# Patient Record
Sex: MALE | Race: WHITE | HISPANIC OR LATINO | Employment: UNEMPLOYED
[De-identification: names, ages, dates, MRNs, and addresses within clinical notes are randomized per-mention and may not be internally consistent; named-entity substitution may affect disease eponyms.]

---

## 2017-07-24 ENCOUNTER — IMAGING SERVICES (OUTPATIENT)
Dept: OTHER | Age: 47
End: 2017-07-24

## 2017-07-24 ENCOUNTER — HOSPITAL (OUTPATIENT)
Dept: OTHER | Age: 47
End: 2017-07-24
Attending: EMERGENCY MEDICINE

## 2020-03-19 ENCOUNTER — APPOINTMENT (OUTPATIENT)
Dept: GENERAL RADIOLOGY | Age: 50
End: 2020-03-19
Attending: EMERGENCY MEDICINE

## 2020-03-19 ENCOUNTER — HOSPITAL ENCOUNTER (EMERGENCY)
Age: 50
Discharge: HOME | End: 2020-03-20
Attending: EMERGENCY MEDICINE

## 2020-03-19 DIAGNOSIS — R07.9 CHEST PAIN, UNSPECIFIED TYPE: Primary | ICD-10-CM

## 2020-03-19 LAB
ANION GAP SERPL CALC-SCNC: 8 MMOL/L (ref 10–20)
BASOPHILS # BLD: 0.1 K/MCL (ref 0–0.3)
BASOPHILS NFR BLD: 1 %
BUN SERPL-MCNC: 18 MG/DL (ref 6–20)
BUN/CREAT SERPL: 26 (ref 7–25)
CALCIUM SERPL-MCNC: 8.6 MG/DL (ref 8.4–10.2)
CHLORIDE SERPL-SCNC: 103 MMOL/L (ref 98–107)
CO2 SERPL-SCNC: 28 MMOL/L (ref 21–32)
CREAT SERPL-MCNC: 0.7 MG/DL (ref 0.67–1.17)
DIFFERENTIAL METHOD BLD: NORMAL
EOSINOPHIL # BLD: 0.1 K/MCL (ref 0.1–0.5)
EOSINOPHIL NFR BLD: 1 %
ERYTHROCYTE [DISTWIDTH] IN BLOOD: 13.5 % (ref 11–15)
GLUCOSE SERPL-MCNC: 251 MG/DL (ref 65–99)
HCT VFR BLD CALC: 43.1 % (ref 39–51)
HGB BLD-MCNC: 14.7 G/DL (ref 13–17)
IMM GRANULOCYTES # BLD AUTO: 0 K/MCL (ref 0–0.2)
IMM GRANULOCYTES NFR BLD: 0 %
LYMPHOCYTES # BLD: 3.1 K/MCL (ref 1–4.8)
LYMPHOCYTES NFR BLD: 32 %
MCH RBC QN AUTO: 30 PG (ref 26–34)
MCHC RBC AUTO-ENTMCNC: 34.1 G/DL (ref 32–36.5)
MCV RBC AUTO: 88 FL (ref 78–100)
MONOCYTES # BLD: 0.8 K/MCL (ref 0.3–0.9)
MONOCYTES NFR BLD: 8 %
NEUTROPHILS # BLD: 5.6 K/MCL (ref 1.8–7.7)
NEUTROPHILS NFR BLD: 58 %
NRBC BLD MANUAL-RTO: 0 /100 WBC
NT-PROBNP SERPL-MCNC: 122 PG/ML
PLATELET # BLD: 151 K/MCL (ref 140–450)
POTASSIUM SERPL-SCNC: 3.8 MMOL/L (ref 3.4–5.1)
RBC # BLD: 4.9 MIL/MCL (ref 4.5–5.9)
SODIUM SERPL-SCNC: 135 MMOL/L (ref 135–145)
TROPONIN I SERPL HS-MCNC: <0.02 NG/ML
WBC # BLD: 9.8 K/MCL (ref 4.2–11)

## 2020-03-19 PROCEDURE — 80048 BASIC METABOLIC PNL TOTAL CA: CPT

## 2020-03-19 PROCEDURE — 99285 EMERGENCY DEPT VISIT HI MDM: CPT

## 2020-03-19 PROCEDURE — 84484 ASSAY OF TROPONIN QUANT: CPT

## 2020-03-19 PROCEDURE — 83880 ASSAY OF NATRIURETIC PEPTIDE: CPT

## 2020-03-19 PROCEDURE — 71045 X-RAY EXAM CHEST 1 VIEW: CPT

## 2020-03-19 PROCEDURE — 96374 THER/PROPH/DIAG INJ IV PUSH: CPT

## 2020-03-19 PROCEDURE — 93005 ELECTROCARDIOGRAM TRACING: CPT | Performed by: EMERGENCY MEDICINE

## 2020-03-19 PROCEDURE — 85025 COMPLETE CBC W/AUTO DIFF WBC: CPT

## 2020-03-19 RX ORDER — HYDROCODONE BITARTRATE AND ACETAMINOPHEN 10; 325 MG/1; MG/1
1 TABLET ORAL ONCE
Status: COMPLETED | OUTPATIENT
Start: 2020-03-19 | End: 2020-03-20

## 2020-03-19 RX ORDER — LIDOCAINE 4 G/G
1 PATCH TOPICAL ONCE
Status: DISCONTINUED | OUTPATIENT
Start: 2020-03-19 | End: 2020-03-20 | Stop reason: HOSPADM

## 2020-03-19 RX ORDER — ASPIRIN 81 MG/1
324 TABLET, CHEWABLE ORAL ONCE
Status: DISCONTINUED | OUTPATIENT
Start: 2020-03-19 | End: 2020-03-20

## 2020-03-19 ASSESSMENT — ENCOUNTER SYMPTOMS
DIZZINESS: 0
SHORTNESS OF BREATH: 0
BACK PAIN: 0
NAUSEA: 0
CHILLS: 0
FEVER: 0
SORE THROAT: 0
HEADACHES: 0
COUGH: 0
COLOR CHANGE: 0
ABDOMINAL PAIN: 0
EYE PAIN: 0

## 2020-03-19 ASSESSMENT — PAIN SCALES - GENERAL: PAINLEVEL_OUTOF10: 8

## 2020-03-19 ASSESSMENT — PAIN DESCRIPTION - PAIN TYPE: TYPE: ACUTE PAIN

## 2020-03-20 VITALS
DIASTOLIC BLOOD PRESSURE: 75 MMHG | SYSTOLIC BLOOD PRESSURE: 116 MMHG | OXYGEN SATURATION: 99 % | HEART RATE: 80 BPM | RESPIRATION RATE: 16 BRPM

## 2020-03-20 LAB
ATRIAL RATE (BPM): 71
ATRIAL RATE (BPM): 84
LDH SERPL L TO P-CCNC: 165 UNITS/L (ref 86–234)
P AXIS (DEGREES): 66
P AXIS (DEGREES): 66
PR-INTERVAL (MSEC): 160
PR-INTERVAL (MSEC): 168
QRS-INTERVAL (MSEC): 106
QRS-INTERVAL (MSEC): 94
QT-INTERVAL (MSEC): 302
QT-INTERVAL (MSEC): 384
QTC: 357
QTC: 418
R AXIS (DEGREES): 80
R AXIS (DEGREES): 86
REPORT TEXT: NORMAL
REPORT TEXT: NORMAL
T AXIS (DEGREES): 87
T AXIS (DEGREES): 91
TROPONIN I SERPL HS-MCNC: <0.02 NG/ML
VENTRICULAR RATE EKG/MIN (BPM): 71
VENTRICULAR RATE EKG/MIN (BPM): 84

## 2020-03-20 PROCEDURE — 10004651 HB RX, NO CHARGE ITEM: Performed by: EMERGENCY MEDICINE

## 2020-03-20 PROCEDURE — 93005 ELECTROCARDIOGRAM TRACING: CPT | Performed by: EMERGENCY MEDICINE

## 2020-03-20 PROCEDURE — 10002800 HB RX 250 W HCPCS: Performed by: EMERGENCY MEDICINE

## 2020-03-20 PROCEDURE — 83615 LACTATE (LD) (LDH) ENZYME: CPT

## 2020-03-20 PROCEDURE — 99285 EMERGENCY DEPT VISIT HI MDM: CPT | Performed by: EMERGENCY MEDICINE

## 2020-03-20 PROCEDURE — 84484 ASSAY OF TROPONIN QUANT: CPT

## 2020-03-20 PROCEDURE — 10002803 HB RX 637: Performed by: EMERGENCY MEDICINE

## 2020-03-20 RX ORDER — ASPIRIN 81 MG/1
162 TABLET, CHEWABLE ORAL ONCE
Status: COMPLETED | OUTPATIENT
Start: 2020-03-20 | End: 2020-03-20

## 2020-03-20 RX ORDER — HYDROCODONE BITARTRATE AND ACETAMINOPHEN 5; 325 MG/1; MG/1
1-2 TABLET ORAL EVERY 4 HOURS PRN
Qty: 12 TABLET | Refills: 0 | Status: SHIPPED | OUTPATIENT
Start: 2020-03-20

## 2020-03-20 RX ORDER — LIDOCAINE 50 MG/G
1 PATCH TOPICAL EVERY 24 HOURS
Qty: 15 PATCH | Refills: 0 | Status: SHIPPED | OUTPATIENT
Start: 2020-03-20

## 2020-03-20 RX ADMIN — HYDROCODONE BITARTRATE AND ACETAMINOPHEN 1 TABLET: 10; 325 TABLET ORAL at 00:16

## 2020-03-20 RX ADMIN — KETOROLAC TROMETHAMINE 15 MG: 15 INJECTION, SOLUTION INTRAMUSCULAR; INTRAVENOUS at 00:24

## 2020-03-20 RX ADMIN — ASPIRIN 81 MG 162 MG: 81 TABLET ORAL at 00:16

## 2020-03-20 RX ADMIN — LIDOCAINE 1 PATCH: 246 PATCH TOPICAL at 00:23

## 2020-03-20 ASSESSMENT — PAIN SCALES - GENERAL: PAINLEVEL_OUTOF10: 2

## 2021-04-23 ENCOUNTER — HOSPITAL ENCOUNTER (INPATIENT)
Facility: HOSPITAL | Age: 51
LOS: 6 days | Discharge: HOME | DRG: 253 | End: 2021-04-30
Attending: INTERNAL MEDICINE | Admitting: HOSPITALIST

## 2021-04-23 ENCOUNTER — APPOINTMENT (OUTPATIENT)
Dept: GENERAL RADIOLOGY | Facility: HOSPITAL | Age: 51
DRG: 253 | End: 2021-04-23

## 2021-04-23 DIAGNOSIS — I73.9 PERIPHERAL VASCULAR DISEASE (HCC): ICD-10-CM

## 2021-04-23 DIAGNOSIS — I96 GANGRENE (HCC): ICD-10-CM

## 2021-04-23 DIAGNOSIS — L03.115 CELLULITIS OF RIGHT LOWER EXTREMITY: ICD-10-CM

## 2021-04-23 DIAGNOSIS — S92.411A CLOSED DISPLACED FRACTURE OF PROXIMAL PHALANX OF RIGHT GREAT TOE, INITIAL ENCOUNTER: Primary | ICD-10-CM

## 2021-04-23 PROCEDURE — 73630 X-RAY EXAM OF FOOT: CPT

## 2021-04-23 RX ORDER — KETOROLAC TROMETHAMINE 30 MG/ML
30 INJECTION, SOLUTION INTRAMUSCULAR; INTRAVENOUS ONCE
Status: COMPLETED | OUTPATIENT
Start: 2021-04-23 | End: 2021-04-23

## 2021-04-23 RX ORDER — HYDROCODONE BITARTRATE AND ACETAMINOPHEN 5; 325 MG/1; MG/1
1 TABLET ORAL ONCE
Status: COMPLETED | OUTPATIENT
Start: 2021-04-23 | End: 2021-04-23

## 2021-04-23 RX ORDER — VANCOMYCIN 2 GRAM/500 ML IN 0.9 % SODIUM CHLORIDE INTRAVENOUS
25 ONCE
Status: COMPLETED | OUTPATIENT
Start: 2021-04-23 | End: 2021-04-24

## 2021-04-24 ENCOUNTER — APPOINTMENT (OUTPATIENT)
Dept: MRI IMAGING | Facility: HOSPITAL | Age: 51
DRG: 253 | End: 2021-04-24
Attending: HOSPITALIST

## 2021-04-24 PROBLEM — L03.115 CELLULITIS OF RIGHT LOWER EXTREMITY: Status: ACTIVE | Noted: 2021-04-24

## 2021-04-24 PROBLEM — S92.411A CLOSED DISPLACED FRACTURE OF PROXIMAL PHALANX OF RIGHT GREAT TOE, INITIAL ENCOUNTER: Status: ACTIVE | Noted: 2021-04-24

## 2021-04-24 PROBLEM — I73.9 PERIPHERAL VASCULAR DISEASE (HCC): Status: ACTIVE | Noted: 2021-04-24

## 2021-04-24 PROCEDURE — 73720 MRI LWR EXTREMITY W/O&W/DYE: CPT | Performed by: HOSPITALIST

## 2021-04-24 PROCEDURE — 99253 IP/OBS CNSLTJ NEW/EST LOW 45: CPT | Performed by: PODIATRIST

## 2021-04-24 PROCEDURE — 0HDRXZZ EXTRACTION OF TOE NAIL, EXTERNAL APPROACH: ICD-10-PCS | Performed by: PODIATRIST

## 2021-04-24 PROCEDURE — 11730 AVULSION NAIL PLATE SIMPLE 1: CPT | Performed by: PODIATRIST

## 2021-04-24 RX ORDER — DOCUSATE SODIUM 100 MG/1
100 CAPSULE, LIQUID FILLED ORAL 2 TIMES DAILY
Status: DISCONTINUED | OUTPATIENT
Start: 2021-04-24 | End: 2021-04-30

## 2021-04-24 RX ORDER — POLYETHYLENE GLYCOL 3350 17 G/17G
17 POWDER, FOR SOLUTION ORAL DAILY PRN
Status: DISCONTINUED | OUTPATIENT
Start: 2021-04-24 | End: 2021-04-30

## 2021-04-24 RX ORDER — DEXTROSE MONOHYDRATE 25 G/50ML
50 INJECTION, SOLUTION INTRAVENOUS
Status: DISCONTINUED | OUTPATIENT
Start: 2021-04-24 | End: 2021-04-24

## 2021-04-24 RX ORDER — DEXTROSE MONOHYDRATE 25 G/50ML
50 INJECTION, SOLUTION INTRAVENOUS
Status: DISCONTINUED | OUTPATIENT
Start: 2021-04-24 | End: 2021-04-30

## 2021-04-24 RX ORDER — HYDROMORPHONE HYDROCHLORIDE 1 MG/ML
0.4 INJECTION, SOLUTION INTRAMUSCULAR; INTRAVENOUS; SUBCUTANEOUS EVERY 2 HOUR PRN
Status: DISCONTINUED | OUTPATIENT
Start: 2021-04-24 | End: 2021-04-30

## 2021-04-24 RX ORDER — ACETAMINOPHEN 325 MG/1
650 TABLET ORAL EVERY 4 HOURS PRN
Status: DISCONTINUED | OUTPATIENT
Start: 2021-04-24 | End: 2021-04-30

## 2021-04-24 RX ORDER — LIDOCAINE HYDROCHLORIDE 10 MG/ML
5 INJECTION, SOLUTION EPIDURAL; INFILTRATION; INTRACAUDAL; PERINEURAL ONCE
Status: COMPLETED | OUTPATIENT
Start: 2021-04-24 | End: 2021-04-24

## 2021-04-24 RX ORDER — HYDROCODONE BITARTRATE AND ACETAMINOPHEN 5; 325 MG/1; MG/1
1 TABLET ORAL EVERY 4 HOURS PRN
Status: DISCONTINUED | OUTPATIENT
Start: 2021-04-24 | End: 2021-04-30

## 2021-04-24 RX ORDER — POTASSIUM CHLORIDE 20 MEQ/1
40 TABLET, EXTENDED RELEASE ORAL EVERY 4 HOURS
Status: COMPLETED | OUTPATIENT
Start: 2021-04-24 | End: 2021-04-24

## 2021-04-24 RX ORDER — SODIUM CHLORIDE 9 MG/ML
INJECTION, SOLUTION INTRAVENOUS CONTINUOUS
Status: DISCONTINUED | OUTPATIENT
Start: 2021-04-24 | End: 2021-04-26

## 2021-04-24 RX ORDER — METRONIDAZOLE 500 MG/100ML
500 INJECTION, SOLUTION INTRAVENOUS EVERY 8 HOURS
Status: DISCONTINUED | OUTPATIENT
Start: 2021-04-24 | End: 2021-04-24

## 2021-04-24 RX ORDER — BISACODYL 10 MG
10 SUPPOSITORY, RECTAL RECTAL
Status: DISCONTINUED | OUTPATIENT
Start: 2021-04-24 | End: 2021-04-30

## 2021-04-24 RX ORDER — TEMAZEPAM 15 MG/1
15 CAPSULE ORAL NIGHTLY PRN
Status: DISCONTINUED | OUTPATIENT
Start: 2021-04-24 | End: 2021-04-30

## 2021-04-24 RX ORDER — ENOXAPARIN SODIUM 100 MG/ML
40 INJECTION SUBCUTANEOUS DAILY
Status: DISCONTINUED | OUTPATIENT
Start: 2021-04-24 | End: 2021-04-27

## 2021-04-24 RX ORDER — ONDANSETRON 2 MG/ML
4 INJECTION INTRAMUSCULAR; INTRAVENOUS EVERY 6 HOURS PRN
Status: DISCONTINUED | OUTPATIENT
Start: 2021-04-24 | End: 2021-04-30

## 2021-04-24 RX ORDER — HYDROMORPHONE HYDROCHLORIDE 1 MG/ML
0.8 INJECTION, SOLUTION INTRAMUSCULAR; INTRAVENOUS; SUBCUTANEOUS EVERY 2 HOUR PRN
Status: DISCONTINUED | OUTPATIENT
Start: 2021-04-24 | End: 2021-04-30

## 2021-04-24 RX ORDER — VANCOMYCIN HYDROCHLORIDE
1500 EVERY 12 HOURS
Status: DISCONTINUED | OUTPATIENT
Start: 2021-04-24 | End: 2021-04-25

## 2021-04-24 RX ORDER — HYDROCODONE BITARTRATE AND ACETAMINOPHEN 5; 325 MG/1; MG/1
2 TABLET ORAL EVERY 4 HOURS PRN
Status: DISCONTINUED | OUTPATIENT
Start: 2021-04-24 | End: 2021-04-30

## 2021-04-24 RX ORDER — SODIUM PHOSPHATE, DIBASIC AND SODIUM PHOSPHATE, MONOBASIC 7; 19 G/133ML; G/133ML
1 ENEMA RECTAL ONCE AS NEEDED
Status: DISCONTINUED | OUTPATIENT
Start: 2021-04-24 | End: 2021-04-30

## 2021-04-24 RX ORDER — HYDROMORPHONE HYDROCHLORIDE 1 MG/ML
0.2 INJECTION, SOLUTION INTRAMUSCULAR; INTRAVENOUS; SUBCUTANEOUS EVERY 2 HOUR PRN
Status: DISCONTINUED | OUTPATIENT
Start: 2021-04-24 | End: 2021-04-30

## 2021-04-24 NOTE — ED QUICK NOTES
Orders for admission, patient is aware of plan and ready to go upstairs. Any questions, please call ED RN Janie  at extension [phone]. .   Type of COVID test sent:Rapid  COVID Suspicion level: Low.  Results negative    Titratable drug(s) infusing:Vancomycin

## 2021-04-24 NOTE — CONSULTS
[facility]    Report of Consultation    Aram Mancilla Patient Status:  Inpatient    1970 MRN [id number]   Location [facility] 5SW/SE Attending Grabiel Gardner MD   Hosp Day # 0 PCP None Pcp     Date of Admission:  2021  D 0.2 mg, Intravenous, Q2H PRN **OR** HYDROmorphone HCl (DILAUDID) 1 MG/ML injection 0.4 mg, 0.4 mg, Intravenous, Q2H PRN **OR** HYDROmorphone HCl (DILAUDID) 1 MG/ML injection 0.8 mg, 0.8 mg, Intravenous, Q2H PRN  •  temazepam (RESTORIL) cap 15 mg, 15 mg, Or includes an Addendum and supersedes previous reports for this exam.    PROCEDURE: XR FOOT, COMPLETE (MIN 3 VIEWS), RIGHT (CPT=73630)  COMPARISON: None. INDICATIONS: Right foot pain and distal swelling post dropping maria l on foot x2 weeks ago.   TECHNIQUE: proximal phalanx.      -Evaluated MRI right foot 4/24/21 revealed no signs of abscess or osteomyelitis    -Due to subungual hematoma and concern for underlying infection and open fracture, recommended Right hallux nail avulsion, pt agrees.     All questions

## 2021-04-24 NOTE — PLAN OF CARE
Problem: Patient Centered Care  Goal: Patient preferences are identified and integrated in the patient's plan of care  Description: Interventions:  - What would you like us to know as we care for you?   - Provide timely, complete, and accurate informatio distraction and/or relaxation techniques  - Monitor for opioid side effects  - Notify MD/LIP if interventions unsuccessful or patient reports new pain  - Anticipate increased pain with activity and pre-medicate as appropriate  Outcome: Progressing     Prob functional status, cognitive ability or social support system  Outcome: Progressing  VSS,had mri today,bedside I&D done today by , dressing dry and intact,wound culture send,pain controlled with dilaudid and norco,unable to do arterial US today

## 2021-04-24 NOTE — PROGRESS NOTES
[facility] Dosing Service    Initial Pharmacokinetic Consult for Vancomycin Dosing     Jim Estrada is a 49year old patient who is being treated for  DM foot lesion with open fx .   Pharmacy has been asked to dose Vancomycin by Dr. Humberto Ball

## 2021-04-24 NOTE — PLAN OF CARE
Problem: Patient Centered Care  Goal: Patient preferences are identified and integrated in the patient's plan of care  Description: Interventions:  - What would you like us to know as we care for you?   From home with my son  - Provide timely, complete, a influences on pain and pain management  - Manage/alleviate anxiety  - Utilize distraction and/or relaxation techniques  - Monitor for opioid side effects  - Notify MD/LIP if interventions unsuccessful or patient reports new pain  - Anticipate increased marysol

## 2021-04-24 NOTE — PROGRESS NOTES
[facility] Pharmacy Note: Antimicrobial Weight Based Dose Adjustment for: cefepime (MAXIPIME)    Jessica Rivera is a 49year old patient who has been prescribed cefepime (MAXIPIME) 2gm every 8 hours.     Estimated Creatinine Clearance: 121.3 mL/min (based on SCr

## 2021-04-24 NOTE — ED PROVIDER NOTES
Patient Seen in: [facility] AND [facility] Emergency Department      History   Patient presents with:  Leg or Foot Injury    Stated Complaint: Foot Injury    HPI/Subjective:   HPI    56-year-old male presents the emergency department for evaluation.   Patient sta atraumatic. Right Ear: External ear normal.      Left Ear: External ear normal.      Nose: Nose normal.      Mouth/Throat:      Mouth: Mucous membranes are moist.      Pharynx: Oropharynx is clear.    Eyes:      Extraocular Movements: Extraocular movem DIFFERENTIAL WITH PLATELET    Narrative: The following orders were created for panel order CBC WITH DIFFERENTIAL WITH PLATELET.   Procedure                               Abnormality         Status                     --------- MD on 4/23/2021 at 10:06 PM     Finalized by (CST): Deacon Garcia MD on 4/23/2021 at 10:07 PM    ADDENDUM:  Corrected report to reflect presence of an acute articular fracture in the right 1st proximal phalanx.   Dictated by (CST): Deacon Garcia MD on 4/23/20

## 2021-04-24 NOTE — CONSULTS
[facility] Infectious Disease Consult    [facility] Patient Status:  Inpatient    1970 MRN [id number]   Location [facility] 5SW/SE Attending Grabiel Gardner MD   Hosp Day # 0 PCP None Pcp     Reason for Consultation:   To help w injection 0.2 mg, 0.2 mg, Intravenous, Q2H PRN **OR** HYDROmorphone HCl (DILAUDID) 1 MG/ML injection 0.4 mg, 0.4 mg, Intravenous, Q2H PRN **OR** HYDROmorphone HCl (DILAUDID) 1 MG/ML injection 0.8 mg, 0.8 mg, Intravenous, Q2H PRN  •  temazepam (RESTORIL) ca skin lesions, and pruritus. Hematologic/lymphatic: Negative for easy bruising, bleeding, and lymphadenopathy. Musculoskeletal: Negative for myalgias, arthralgias, muscle weakness.   Neurological: Negative for headaches, dizziness, seizures, memory problem 10.1 04/24/2021    HGB 13.1 04/24/2021    HCT 38.5 04/24/2021    .0 04/24/2021    CREATSERUM 0.66 04/24/2021    BUN 11 04/24/2021     04/24/2021    K 3.6 04/24/2021     04/24/2021    CO2 26.0 04/24/2021     04/24/2021    CA 7.8 04

## 2021-04-24 NOTE — ED QUICK NOTES
Right foot swollen and redness noted. Right pedal pulse + via doppler. Right great toe necrotic in appearance. Pt states maria l at work fell on right foot 2 weeks ago. Pt with hx of diabetes. Pt non compliant with diabetic medication.  Pt denies feve

## 2021-04-24 NOTE — ED INITIAL ASSESSMENT (HPI)
States he dropped a maria l on his foot two weeks ago at work. [employer]. Bruising and swelling to right foot. Great toe bruising and discolored - deep purple and black with some blood oozing at the corner of his nail. Pulses palpable.

## 2021-04-24 NOTE — ED QUICK NOTES
Pt reports no improvement in pain with Andes administration. Linda HERNANDEZ notified. Orders placed by provider.

## 2021-04-24 NOTE — ED QUICK NOTES
Orders for admission, patient is aware of plan and ready to go upstairs. Any questions, please call ED RN Philippe Roca  at [address]    Type of COVID test sent: rapid(-)  COVID Suspicion level:Low    Titratable drug(s) infusing: none  Rate:na    LOC at [place]

## 2021-04-24 NOTE — H&P
[facility] Hospitalist H&P       CC: Patient presents with:  Leg or Foot Injury       PCP: None Pcp    History of Present Illness: 48 y/o m p/w pain to toe after dropping heavy maria l on his toe few weeks ago, pain and swelling have worsened      PMH  History re 04/23/21 2150 04/24/21  0643   WBC 13.3* 10.1   HGB 15.2 13.1   MCV 86.2 86.7   .0 156.0       Recent Labs   Lab 04/23/21 2150 04/24/21  0643    139   K 3.9 3.6    108   CO2 29.0 26.0   BUN 11 11   CREATSERUM 0.80 0.66*   * 202* deformities.    Dictated by (CST): Jozef Santana MD on 4/23/2021 at 10:06 PM     Finalized by (CST): Jozef Santana MD on 4/23/2021 at 10:07 PM              ASSESSMENT / PLAN:   50 y/o m p/w pain to toe after dropping heavy maria l on his toe few weeks ago, pain

## 2021-04-25 PROCEDURE — 99232 SBSQ HOSP IP/OBS MODERATE 35: CPT | Performed by: PODIATRIST

## 2021-04-25 RX ORDER — VANCOMYCIN/0.9 % SOD CHLORIDE 1.75 G/5
1750 PLASTIC BAG, INJECTION (ML) INTRAVENOUS EVERY 12 HOURS
Status: DISCONTINUED | OUTPATIENT
Start: 2021-04-25 | End: 2021-04-25

## 2021-04-25 NOTE — PROGRESS NOTES
Robert Blank is a 49year old male admitted to [address] [place] [address] on 4/23 post traumatic R foot injury,     HPI:    Patient seen and examined,   Afebrile,   Previous entries noted,   No new complains,   Pain is much better,     Allergies:  No Known Allergies     There Osmolality 293 275 - 295 mOsm/kg    GFR, Non- 113 >=60    GFR, -American 130 >=60   POTASSIUM   Result Value Ref Range    Potassium 4.0 3.5 - 5.1 mmol/L   VANCOMYCIN TROUGH, SERUM   Result Value Ref Range    Vancomycin Trough 6.4 (L) (Group B beta strep)    Aerobic Smear No WBCs seen     Aerobic Smear 2+ Gram positive cocci in pairs     Aerobic Smear 1+ Gram Negative Rods    MRSA SCREEN BY PCR   Result Value Ref Range    MRSA Screen By PCR Negative Negative   CBC W/ DIFFERENTIAL   Resu ROS:   GENERAL HEALTH: No fevers, chills, unexpected weight loss. HEENT:  Denies sore throat, neck pain,neck rigidity, difficulty swallowing, swollen glands. RESPIRATORY:  Denies SOB or cough. CARDIOVASCULAR:  Denies CP or palpitations.   GI:  Lucila Laguerre Patient understood and Agreed.

## 2021-04-25 NOTE — PROGRESS NOTES
[facility] Hospitalist Progress Note     CC: Hospital Follow up    PCP: None Pcp       Assessment/Plan:     Principal Problem:    Closed displaced fracture of proximal phalanx of right great toe, initial encounter  Active Problems:    Closed displaced fracture of Recent Labs   Lab 04/23/21  2150 04/24/21  0643   RBC 5.09 4.44   HGB 15.2 13.1   HCT 43.9 38.5*   MCV 86.2 86.7   MCH 29.9 29.5   MCHC 34.6 34.0   RDW 12.9 13.0   NEPRELIM 9.46* 6.52   WBC 13.3* 10.1   .0 156.0         Recent Labs   Lab 04/23/2 (CST): Khris Diaz MD on 4/23/2021 at 10:09 PM             Result Date: 4/23/2021  CONCLUSION: No acute osseous abnormality. Calcaneal enthesophyte. Hammertoe deformities.    Dictated by (CST): Khris Diaz MD on 4/23/2021 at 10:06 PM     Finalized by (C

## 2021-04-25 NOTE — PROGRESS NOTES
[facility] dosing service    Follow-up Pharmacokinetic Consult for Vancomycin Dosing     Lennon Carrel is a 49year old patient who is being treated for cellulitis. Patient is on day 3 of Vancomycin and is currently receiving 1.5 gm IV Q 12 hours.   G avoid an overnight leve. Goal trough level 10-15 ug/mL. 3.  Pharmacy will need Scr daily while on Vancomycin to assess renal function. 4.  Pharmacy will follow and adjust as necessary.     We appreciate the opportunity to assist in the care of this p

## 2021-04-25 NOTE — PLAN OF CARE
Cont'd IV Vanco and  Cefepime. No changes overnight. Vitals stable.     Problem: Patient Centered Care  Goal: Patient preferences are identified and integrated in the patient's plan of care  Description: Interventions:  - What would you like us to know as w neutropenic period  Description: INTERVENTIONS  - Monitor WBC  - Administer growth factors as ordered  - Implement neutropenic guidelines  Outcome: Progressing     Problem: SAFETY ADULT - FALL  Goal: Free from fall injury  Description: INTERVENTIONS:  - As

## 2021-04-25 NOTE — PLAN OF CARE
Problem: Patient Centered Care  Goal: Patient preferences are identified and integrated in the patient's plan of care  Description: Interventions:  - What would you like us to know as we care for you?   - Provide timely, complete, and accurate informatio distraction and/or relaxation techniques  - Monitor for opioid side effects  - Notify MD/LIP if interventions unsuccessful or patient reports new pain  - Anticipate increased pain with activity and pre-medicate as appropriate  Outcome: Progressing  VSS,marysol

## 2021-04-25 NOTE — PROGRESS NOTES
[facility]    PODIATRY PROGRESS NOTE    Joshua Britt Patient Status:  Inpatient    1970 MRN [id number]   Location [facility] 5SW/SE Attending Eugenie Thomas MD   [facility] Day # 1 PCP None Pcp     Date of Admission:  2021 PRN  •  docusate sodium (COLACE) cap 100 mg, 100 mg, Oral, BID  •  PEG 3350 (MIRALAX) powder packet 17 g, 17 g, Oral, Daily PRN  •  magnesium hydroxide (MILK OF MAGNESIA) 400 MG/5ML suspension 30 mL, 30 mL, Oral, Daily PRN  •  bisacodyl (DULCOLAX) rectal s deformities involving toes 2-5. Cortical deformity in the medial base of the 1st proximal phalanx best seen on the oblique view compatible with a nondisplaced articular fracture. Otherwise, no acute fracture or suspicious bone lesion.  SOFT TISSUES: Ather Plan:  Right hallux fracture  Right hallux cellulitis, improved  PVD  S/P right hallux nail avulsion  DM, uncontrolled  Leukocytosis, resolved        -Evaluated x rays Right foot 3 views on 4/23/21 which revealed non displaced fx of the base of the 1st pro

## 2021-04-26 ENCOUNTER — APPOINTMENT (OUTPATIENT)
Dept: ULTRASOUND IMAGING | Facility: HOSPITAL | Age: 51
DRG: 253 | End: 2021-04-26
Attending: PODIATRIST

## 2021-04-26 PROCEDURE — 99232 SBSQ HOSP IP/OBS MODERATE 35: CPT | Performed by: PODIATRIST

## 2021-04-26 PROCEDURE — 93923 UPR/LXTR ART STDY 3+ LVLS: CPT | Performed by: PODIATRIST

## 2021-04-26 RX ORDER — HYDROCODONE BITARTRATE AND ACETAMINOPHEN 5; 325 MG/1; MG/1
1 TABLET ORAL EVERY 8 HOURS PRN
Qty: 12 TABLET | Refills: 0 | Status: SHIPPED | OUTPATIENT
Start: 2021-04-26 | End: 2021-04-30

## 2021-04-26 NOTE — PROGRESS NOTES
[facility] Infectious Disease  Progress Note    Odilon David Patient Status:  Inpatient    1970 MRN [id number]   Location [facility] 5SW/SE Attending Keanu Sanz MD   Hosp Day # 2 PCP None Pcp     Subjective:  Patient seen/ex with nondisplaced fracture of the proximal phalanx  - MRI without OM  - Cultures with e.coli, GBS, staph lugdunensisi  - IV ceftriaxone ongoing    2. DM II  - Patient needs tight glycemic control for optimal treatment of his infection    3.   Leukocytosis

## 2021-04-26 NOTE — DIABETES ED
[facility]    Diabetes Education  Note    Robert Blnak Patient Status:  Inpatient   1970 MRN [id number]  Location [facility] 5SW/SE Attending Garnet Moritz, MD  Hosp Day # 2 PCP None Pcp      Labs:    HgbA1C (%)   Date Value follow up after discharge.         Education Provided:  · Benefits of testing BG as directed - record results and report to MD  · Hypoglycemia symptoms/treatment/prevention  · Basic Diet Guidelines  · Importance of good BG control to prevent short and long

## 2021-04-26 NOTE — PROGRESS NOTES
[place] [facility] - [facility]    PODIATRY PROGRESS NOTE    Gabriela Treviño Patient Status:  Inpatient    1970 MRN [id number]   Location [facility] 5SW/SE Attending Joao Robison MD   [address] Day # 2 PCP None Pcp     Date of Admission:  2021 (RESTORIL) cap 15 mg, 15 mg, Oral, Nightly PRN  •  docusate sodium (COLACE) cap 100 mg, 100 mg, Oral, BID  •  PEG 3350 (MIRALAX) powder packet 17 g, 17 g, Oral, Daily PRN  •  magnesium hydroxide (MILK OF MAGNESIA) 400 MG/5ML suspension 30 mL, 30 mL, Oral, oblique view compatible with a nondisplaced articular fracture. Otherwise, no acute fracture or suspicious bone lesion. SOFT TISSUES: Atherosclerotic vascular calcifications are present. EFFUSION: None visible. OTHER: Negative.   CONCLUSION: Nondisplaced a uncontrolled  Leukocytosis, resolved        -Evaluated x rays Right foot 3 views on 4/23/21 which revealed non displaced fx of the base of the 1st proximal phalanx.       -Evaluated MRI right foot 4/24/21 revealed no signs of abscess or osteomyelitis     -

## 2021-04-26 NOTE — PROGRESS NOTES
[facility] Hospitalist Progress Note     CC: Hospital Follow up    PCP: None Pcp       Assessment/Plan:     Principal Problem:    Closed displaced fracture of proximal phalanx of right great toe, initial encounter  Active Problems:    Closed displaced fracture of sensory intact  Psych: Affect- normal  SKIN: warm, dry  EXT: no edema      Data Review:       Labs:     Recent Labs   Lab 04/23/21  2150 04/24/21  0643   RBC 5.09 4.44   HGB 15.2 13.1   HCT 43.9 38.5*   MCV 86.2 86.7   MCH 29.9 29.5   MCHC 34.6 34.0   RDW 1st proximal phalanx. Dictated by (CST): Bhanu Gutierrez MD on 4/23/2021 at 10:08 PM     Finalized by (CST): Bhanu Gutierrez MD on 4/23/2021 at 10:09 PM             Result Date: 4/23/2021  CONCLUSION: No acute osseous abnormality. Calcaneal enthesophyte.   Beatriz

## 2021-04-26 NOTE — PLAN OF CARE
Increased pain right foot requiring dilaudid, had arterial doppler studies.  On rocephin  Problem: Patient Centered Care  Goal: Patient preferences are identified and integrated in the patient's plan of care  Description: Interventions:  - What would you li based on type and severity of pain and evaluate response  - Implement non-pharmacological measures as appropriate and evaluate response  - Consider cultural and social influences on pain and pain management  - Manage/alleviate anxiety  - Utilize distractio patient's ability to be responsible for managing their own health  - Refer to Case Management Department for coordinating discharge planning if the patient needs post-hospital services based on physician/LIP order or complex needs related to functional sta

## 2021-04-26 NOTE — PLAN OF CARE
Dilaudid for pain as needed. Arterial US right leg scheduled for this morning. No changes overnight. Vitals stable.     Problem: Patient Centered Care  Goal: Patient preferences are identified and integrated in the patient's plan of care  Description: Inter Absence of fever/infection during anticipated neutropenic period  Description: INTERVENTIONS  - Monitor WBC  - Administer growth factors as ordered  - Implement neutropenic guidelines  Outcome: Progressing     Problem: SAFETY ADULT - FALL  Goal: Free from

## 2021-04-27 PROBLEM — I96 GANGRENE OF TOE OF RIGHT FOOT (HCC): Status: ACTIVE | Noted: 2021-04-27

## 2021-04-27 PROCEDURE — 99233 SBSQ HOSP IP/OBS HIGH 50: CPT | Performed by: PODIATRIST

## 2021-04-27 NOTE — PLAN OF CARE
Problem: Patient Centered Care  Goal: Patient preferences are identified and integrated in the patient's plan of care  Description: Interventions:  - What would you like us to know as we care for you?  Lives alone, stopped taking diabetes meds years ago measures as appropriate and evaluate response  - Consider cultural and social influences on pain and pain management  - Manage/alleviate anxiety  - Utilize distraction and/or relaxation techniques  - Monitor for opioid side effects  - Notify MD/LIP if inte Department for coordinating discharge planning if the patient needs post-hospital services based on physician/LIP order or complex needs related to functional status, cognitive ability or social support system  Outcome: Progressing       Patient is a/ox4.

## 2021-04-27 NOTE — PROGRESS NOTES
[facility] Hospitalist Progress Note     CC: Hospital Follow up    PCP: None Pcp       Assessment/Plan:     Principal Problem:    Closed displaced fracture of proximal phalanx of right great toe, initial encounter  Active Problems:    Closed displaced fracture of murmurs, 2+ peripheral pulses  ABD: Soft, non-tender, non-distended, +BS  MSK: toe w swelling and some eschar of nail bed  Neuro: Grossly normal, CN intact, sensory intact  Psych: Affect- normal  SKIN: warm, dry  EXT: no edema      Data Review:       Labs: MD KATIE on 4/26/2021 at 6:32 PM              Meds:     • cefTRIAXone  1 g Intravenous Q24H   • enoxaparin  40 mg Subcutaneous Daily   • docusate sodium  100 mg Oral BID   • Insulin Aspart Pen  1-7 Units Subcutaneous TID CC       glucose **OR** Glucose-Vitami

## 2021-04-27 NOTE — PROGRESS NOTES
[place] [facility] - [facility]    PODIATRY PROGRESS NOTE    Jessica Rivera Patient Status:  Inpatient    1970 MRN [id number]   Location [facility] 5SW/SE Attending Kristal Mccartney MD   [facility] Day # 3 PCP None Pcp     Date of Admission:  2021 (RESTORIL) cap 15 mg, 15 mg, Oral, Nightly PRN  •  docusate sodium (COLACE) cap 100 mg, 100 mg, Oral, BID  •  PEG 3350 (MIRALAX) powder packet 17 g, 17 g, Oral, Daily PRN  •  magnesium hydroxide (MILK OF MAGNESIA) 400 MG/5ML suspension 30 mL, 30 mL, Oral, Tomas Sanchez MD on 4/24/2021 at 2:28 PM          US ART LOWER EXT BILAT Prosper Chew PRESSURES (WPZ=81519)    Result Date: 4/26/2021  CONCLUSION:  1.  There is evidence of mild-to-moderate peripheral arterial disease on the right, with dampening of distal wavef

## 2021-04-27 NOTE — CONSULTS
Cortez Bush Mean, [facility]  Vascular and Endovascular Surgery  [address]     VASCULAR SURGERY   INPATIENT CONSULT NOTE      Name: Virgen Haines   :   1970  [id number]     Date of Consultation: 2021       REFERMARIO HYDROcodone-acetaminophen (NORCO) 5-325 MG per tab 2 tablet, 2 tablet, Oral, Q4H PRN  •  HYDROmorphone HCl (DILAUDID) 1 MG/ML injection 0.2 mg, 0.2 mg, Intravenous, Q2H PRN **OR** HYDROmorphone HCl (DILAUDID) 1 MG/ML injection 0.4 mg, 0.4 mg, Intravenous, no rashes, warm and dry  EXTREMITIES: no tenderness  VASCULAR:        Femoral Popliteal DP PT Peroneal Edema   Right 1+       biphasic signal biphasic signal monophasic signal none   Left 1+       palpable, weak biphasic signal biphasic signal none      Th compatible with a nondisplaced articular fracture. Otherwise, no acute fracture or suspicious bone lesion. SOFT TISSUES: Atherosclerotic vascular calcifications are present. EFFUSION: None visible. OTHER: Negative.   CONCLUSION: Nondisplaced acute articula FINDINGS:  BONES/JOINTS: Corresponding to the nondisplaced fracture seen at the medial corner base of proximal phalanx there is bone marrow edema with subtle visualization of the fracture on image number 5 series 8.   There is bone marrow edema and surround ANKLE-BRACHIAL INDEX: 0.8  LEFT BRACHIAL: 123 mmHg PROXIMAL THIGH: 147 mmHg Triphasic MID THIGH: 149 mmHg Triphasic POPLITEAL: 140 mmHg Triphasic POSTERIOR TIBIAL: >254 mmHg Triphasic DORSALIS PEDIS: 119 mmHg Triphasic  ANKLE-BRACHIAL INDEX: 1.0 understanding of these issues and agreed to the plan and all questions were answered. Thank you for allowing to participate in the patient's care. Cortez Smith MD  Vascular Surgery   [facility]

## 2021-04-27 NOTE — PLAN OF CARE
Patient continuing to have pain in right big toe. Patient's pain being managed by [place]. Cardiac carb controlled diet with accuchek AC/HS. Patient ultrasound doppler came back abnormal so Nick Esquivel has been consulted from vascular surgery.  Patient refusing to Verbalizes/displays adequate comfort level or patient's stated pain goal  Description: INTERVENTIONS:  - Encourage pt to monitor pain and request assistance  - Assess pain using appropriate pain scale  - Administer analgesics based on type and severity of learning needs (meds, wound care, etc)  - Arrange for interpreters to assist at discharge as needed  - Consider post-discharge preferences of patient/family/discharge partner  - Complete POLST form as appropriate  - Assess patient's ability to be responsib

## 2021-04-27 NOTE — PROGRESS NOTES
[facility] Infectious Disease  Progress Note    Vi Feng Patient Status:  Inpatient    1970 MRN [id number]   Location [facility] 5SW/SE Attending Jeison Batista MD   Hosp Day # 3 PCP None Pcp     Subjective:  Patient seen/ex cellulitis after trauma to R great toe with drainage  - XR with nondisplaced fracture of the proximal phalanx  - MRI without OM  - Cultures with e.coli, GBS, staph lugdunensis  - IV ceftriaxone ongoing     2.   DM II  - Patient needs tight glycemic control

## 2021-04-28 ENCOUNTER — APPOINTMENT (OUTPATIENT)
Dept: INTERVENTIONAL RADIOLOGY/VASCULAR | Facility: HOSPITAL | Age: 51
DRG: 253 | End: 2021-04-28
Attending: SURGERY

## 2021-04-28 PROCEDURE — B41F1ZZ FLUOROSCOPY OF RIGHT LOWER EXTREMITY ARTERIES USING LOW OSMOLAR CONTRAST: ICD-10-PCS | Performed by: SURGERY

## 2021-04-28 PROCEDURE — 99232 SBSQ HOSP IP/OBS MODERATE 35: CPT | Performed by: PODIATRIST

## 2021-04-28 PROCEDURE — 047M3ZZ DILATION OF RIGHT POPLITEAL ARTERY, PERCUTANEOUS APPROACH: ICD-10-PCS | Performed by: SURGERY

## 2021-04-28 PROCEDURE — 047T3Z1 DILATION OF RIGHT PERONEAL ARTERY USING DRUG-COATED BALLOON, PERCUTANEOUS APPROACH: ICD-10-PCS | Performed by: SURGERY

## 2021-04-28 PROCEDURE — 047P3ZZ DILATION OF RIGHT ANTERIOR TIBIAL ARTERY, PERCUTANEOUS APPROACH: ICD-10-PCS | Performed by: SURGERY

## 2021-04-28 PROCEDURE — B4101ZZ FLUOROSCOPY OF ABDOMINAL AORTA USING LOW OSMOLAR CONTRAST: ICD-10-PCS | Performed by: SURGERY

## 2021-04-28 RX ORDER — HEPARIN SODIUM 1000 [USP'U]/ML
INJECTION, SOLUTION INTRAVENOUS; SUBCUTANEOUS
Status: COMPLETED
Start: 2021-04-28 | End: 2021-04-28

## 2021-04-28 RX ORDER — ATORVASTATIN CALCIUM 40 MG/1
40 TABLET, FILM COATED ORAL NIGHTLY
Status: DISCONTINUED | OUTPATIENT
Start: 2021-04-29 | End: 2021-04-30

## 2021-04-28 RX ORDER — ASPIRIN 81 MG/1
81 TABLET, CHEWABLE ORAL DAILY
Status: DISCONTINUED | OUTPATIENT
Start: 2021-04-29 | End: 2021-04-30

## 2021-04-28 RX ORDER — CLOPIDOGREL BISULFATE 75 MG/1
75 TABLET ORAL DAILY
Status: DISCONTINUED | OUTPATIENT
Start: 2021-04-29 | End: 2021-04-30

## 2021-04-28 RX ORDER — NITROGLYCERIN 20 MG/100ML
INJECTION INTRAVENOUS
Status: COMPLETED
Start: 2021-04-28 | End: 2021-04-28

## 2021-04-28 RX ORDER — ENOXAPARIN SODIUM 100 MG/ML
40 INJECTION SUBCUTANEOUS DAILY
Status: DISCONTINUED | OUTPATIENT
Start: 2021-04-28 | End: 2021-04-30

## 2021-04-28 RX ORDER — ASPIRIN 81 MG/1
TABLET, CHEWABLE ORAL
Status: COMPLETED
Start: 2021-04-28 | End: 2021-04-28

## 2021-04-28 RX ORDER — CLOPIDOGREL BISULFATE 75 MG/1
TABLET ORAL
Status: COMPLETED
Start: 2021-04-28 | End: 2021-04-28

## 2021-04-28 RX ORDER — MIDAZOLAM HYDROCHLORIDE 1 MG/ML
INJECTION INTRAMUSCULAR; INTRAVENOUS
Status: COMPLETED
Start: 2021-04-28 | End: 2021-04-28

## 2021-04-28 RX ORDER — LIDOCAINE HYDROCHLORIDE 20 MG/ML
INJECTION, SOLUTION EPIDURAL; INFILTRATION; INTRACAUDAL; PERINEURAL
Status: COMPLETED
Start: 2021-04-28 | End: 2021-04-28

## 2021-04-28 NOTE — PROGRESS NOTES
[facility] Hospitalist Progress Note     CC: Hospital Follow up    PCP: None Pcp       Assessment/Plan:     Principal Problem:    Closed displaced fracture of proximal phalanx of right great toe, initial encounter  Active Problems:    Closed displaced fracture of +BS  MSK: toe w swelling and some eschar of nail bed  Neuro: Grossly normal  Psych: Affect- normal  SKIN: warm, dry  EXT: no edema      Data Review:       Labs:     Recent Labs   Lab 04/23/21  2150 04/24/21  0643   RBC 5.09 4.44   HGB 15.2 13.1   HCT 43.9

## 2021-04-28 NOTE — PLAN OF CARE
Problem: Patient Centered Care  Goal: Patient preferences are identified and integrated in the patient's plan of care  Description: Interventions:  - What would you like us to know as we care for you?  Lives alone, stopped taking diabetes meds years ago measures as appropriate and evaluate response  - Consider cultural and social influences on pain and pain management  - Manage/alleviate anxiety  - Utilize distraction and/or relaxation techniques  - Monitor for opioid side effects  - Notify MD/LIP if inte Department for coordinating discharge planning if the patient needs post-hospital services based on physician/LIP order or complex needs related to functional status, cognitive ability or social support system  Outcome: Progressing     Patient is a/ox4.  Felix Keller

## 2021-04-28 NOTE — PROGRESS NOTES
[facility] [facility] - [facility]    PODIATRY PROGRESS NOTE    Sheliah Barthel Patient Status:  Inpatient    1970 MRN [id number]   Location [facility] 5SW/SE Attending Naty Liu MD   [facility] Day # 4 PCP None Pcp     Date of Admission:  2021 docusate sodium (COLACE) cap 100 mg, 100 mg, Oral, BID  •  PEG 3350 (MIRALAX) powder packet 17 g, 17 g, Oral, Daily PRN  •  magnesium hydroxide (MILK OF MAGNESIA) 400 MG/5ML suspension 30 mL, 30 mL, Oral, Daily PRN  •  bisacodyl (DULCOLAX) rectal supposito 156.0       Impression and Plan:  Right hallux dry gangrene  PAD  DM, uncontrolled        -Evaluated x rays Right foot 3 views on 4/23/21 which revealed non displaced fx of the base of the 1st proximal phalanx.       -Evaluated MRI right foot 4/24/21 revea

## 2021-04-28 NOTE — IVS NOTE
Bedside handoff to Psychiatric RN   Pt pain to right big toe. Procedural site dry and intact, no bleeding or swelling noted.  Activity restriction and bedrest status reviewed

## 2021-04-28 NOTE — DIETARY NOTE
Nutrition Re-screen    Pt seen by RD d/t LOS. Pt not at nutrition risk. Pt with good appetite and PO intake >75%. No n/v reported per pt/chart review. Skin intact. Will follow up per protocol and monitor as needed.      Wt Readings from Last 5 Encounters:

## 2021-04-28 NOTE — PLAN OF CARE
Patient foot is becoming worse and gangrenous. Patient went down for a right leg angiogram with possible intervention today at 1600. Patient was NPO after 0800.  Patient continuing to have pain which is being managed by dilaudid and norco. Patient accucheck foot    Interventions:   - prn pain meds, antibiotics.  wbat  - See additional Care Plan goals for specific interventions  Outcome: Progressing     Problem: PAIN - ADULT  Goal: Verbalizes/displays adequate comfort level or patient's stated pain goal  Jenni and caregiver  - Include patient/family/discharge partner in discharge planning  - Arrange for needed discharge resources and transportation as appropriate  - Identify discharge learning needs (meds, wound care, etc)  - Arrange for interpreters to assist a

## 2021-04-28 NOTE — PROGRESS NOTES
[facility] AND [facility] Infectious Disease Progress Note    Sharyle Rising Patient Status:  Inpatient    1970 MRN [id number]   Location [facility] 5SW/SE Attending Gabbie Hanks MD   Hosp Day # 4 PCP None Pcp     Subjective:  Pt with compl Units, 1-7 Units, Subcutaneous, TID CC    Physical Exam:  General: Alert, orientated x3. Cooperative. No apparent distress. Vital Signs:  Blood pressure 115/68, pulse 81, temperature 98.3 °F (36.8 °C), temperature source Oral, resp.  rate 18, height 6' (

## 2021-04-28 NOTE — IVS NOTE
Procedure hand off report given to [facility]. Pt's vital signs are stable. Procedural access site is dry and intact with no signs and symptoms of bleeding and hematoma. Medications, bedrest and diet reviewed.

## 2021-04-29 PROCEDURE — 99233 SBSQ HOSP IP/OBS HIGH 50: CPT | Performed by: PODIATRIST

## 2021-04-29 RX ORDER — POTASSIUM CHLORIDE 20 MEQ/1
40 TABLET, EXTENDED RELEASE ORAL ONCE
Status: COMPLETED | OUTPATIENT
Start: 2021-04-29 | End: 2021-04-29

## 2021-04-29 RX ORDER — CEFADROXIL 500 MG/1
500 CAPSULE ORAL 2 TIMES DAILY
Qty: 20 CAPSULE | Refills: 0 | Status: SHIPPED | OUTPATIENT
Start: 2021-04-29 | End: 2021-05-13

## 2021-04-29 NOTE — PROGRESS NOTES
[facility] Hospitalist Progress Note     CC: Hospital Follow up    PCP: None Pcp       Assessment/Plan:     Principal Problem:    Closed displaced fracture of proximal phalanx of right great toe, initial encounter  Active Problems:    Closed displaced fracture of 460 ml   Output 2450 ml   Net -1990 ml       Last 3 Weights  04/23/21 2049 : 182 lb (82.6 kg)      Exam   GEN: NAD  HEENT: EOMI  Neck: Supple, no JVD  Pulm: CTAB, no crackles or wheezes  CV: RRR, no murmurs, S1, S2 heard   ABD: Soft, non-tender, non-disten Daily   • atorvastatin  40 mg Oral Nightly   • cefTRIAXone  1 g Intravenous Q24H   • docusate sodium  100 mg Oral BID       glucose **OR** Glucose-Vitamin C **OR** dextrose **OR** glucose **OR** Glucose-Vitamin C, acetaminophen **OR** HYDROcodone-acetamino

## 2021-04-29 NOTE — PROGRESS NOTES
[facility] AND [facility] Infectious Disease Progress Note    Joshua Britt Patient Status:  Inpatient    1970 MRN [id number]   Location [facility] 5SW/SE Attending Silvio Hodgkins, MD   Hosp Day # 5 PCP None Pcp     Subjective:  Pt states marysol MAGNESIA) 400 MG/5ML suspension 30 mL, 30 mL, Oral, Daily PRN  •  bisacodyl (DULCOLAX) rectal suppository 10 mg, 10 mg, Rectal, Daily PRN  •  Fleet Enema (FLEET) 7-19 GM/118ML enema 133 mL, 1 enema, Rectal, Once PRN  •  ondansetron HCl (ZOFRAN) injection 4

## 2021-04-29 NOTE — PHYSICAL THERAPY NOTE
Orders received, chart reviewed. Discussed case with RN and Dr. Padma Dougherty, DPM. Plan for R 1st toe amputation tomorrow AM. Dr. Padma Dougherty reporting pt will be PWB throughout heel in post op shoe post-operatively. Will f/u in future as appropriate.  Please ente

## 2021-04-29 NOTE — OPERATIVE REPORT
Vascular Surgery Op Note  Patients Name:    Hallie Bernabe    Operating Physician: Tonie James MD  CSN:    [id number]                                                           Location:  OR  MRN:     [id number] evaluated my partner and underwent noninvasive vascular lab studies that revealed inadequate flow for wound healing. As such he was recommended for a right leg angiogram to improve his potential for wound healing for limb salvage.   The risks and benefits was then withdrawn into the popliteal and used to redirect the wire into the peroneal.  Angioplasty of this was performed with a 3 mm balloon. The popliteal stenosis was predilated with a 3 mm balloon followed by a 5 mm drug-coated balloon.   Med Bliss

## 2021-04-29 NOTE — PROGRESS NOTES
[facility]     Vascular Surgery Progress Note    Lorelei Espinoza Patient Status:  Inpatient    1970 MRN [id number]   Location [facility] 5SW/SE Attending Lizet Ibarra MD   Hosp Day # 5 PCP None Pcp     Objective:   Temp: Q15 Min PRN  acetaminophen (TYLENOL) tab 650 mg, 650 mg, Oral, Q4H PRN   Or  HYDROcodone-acetaminophen (NORCO) 5-325 MG per tab 1 tablet, 1 tablet, Oral, Q4H PRN   Or  HYDROcodone-acetaminophen (NORCO) 5-325 MG per tab 2 tablet, 2 tablet, Oral, Q4H PRN  HY HBCAB, HBVDNAINTERP, ANAS, C3, C4 in the last 168 hours.     Ivory Alfonso MD  4/29/2021  10:34 AM

## 2021-04-29 NOTE — PLAN OF CARE
Up with WBAT status. Dilaudid given for pain in R great toe with fairly good relief but states pain is throbbing pain. Plan for amputation of the toe tomorrow and consent for procedure obtained.      Problem: Patient Centered Care  Goal: Patient preferences assessment  - Modify environment to reduce risk of injury  - Provide assistive devices as appropriate  - Consider OT/PT consult to assist with strengthening/mobility  - Encourage toileting schedule  Outcome: Progressing     Problem: Patient/Family Goals  G home or other facility with appropriate resources  Description: INTERVENTIONS:  - Identify barriers to discharge w/pt and caregiver  - Include patient/family/discharge partner in discharge planning  - Arrange for needed discharge resources and transportati

## 2021-04-29 NOTE — OCCUPATIONAL THERAPY NOTE
Spoke with podiatry. Per Dr. Erick Hudson, pt is scheduled for RT great toe amputation on 4/30 at 0730. MD requesting hold eval until post op at which time MD states pt will be PWB- WB through heel in post op shoe.

## 2021-04-29 NOTE — PLAN OF CARE
Problem: Patient Centered Care  Goal: Patient preferences are identified and integrated in the patient's plan of care  Description: Interventions:  - What would you like us to know as we care for you?  Lives alone, stopped taking diabetes meds years ago specific interventions  Outcome: Progressing     Problem: PAIN - ADULT  Goal: Verbalizes/displays adequate comfort level or patient's stated pain goal  Description: INTERVENTIONS:  - Encourage pt to monitor pain and request assistance  - Assess pain using needed discharge resources and transportation as appropriate  - Identify discharge learning needs (meds, wound care, etc)  - Arrange for interpreters to assist at discharge as needed  - Consider post-discharge preferences of patient/family/discharge partne

## 2021-04-29 NOTE — CM/SW NOTE
SW received MDO for finances. Patient is listed as self pay. EFREM made referral to [address] for emergent Medicaid application/coverage for this hospitalization. Left  for [facility] 4/28 and 4/29.     EFREM provided resources on [facility] for furt

## 2021-04-29 NOTE — PROGRESS NOTES
[facility]    PODIATRY PROGRESS NOTE    Peng Marvin Patient Status:  Inpatient    1970 MRN [id number]   Location [facility] 5SW/SE Attending Nicole Aldana MD   Psychiatric Day # 5 PCP None Pcp     Date of Admission:  2021 0.2 mg, Intravenous, Q2H PRN **OR** HYDROmorphone HCl (DILAUDID) 1 MG/ML injection 0.4 mg, 0.4 mg, Intravenous, Q2H PRN **OR** HYDROmorphone HCl (DILAUDID) 1 MG/ML injection 0.8 mg, 0.8 mg, Intravenous, Q2H PRN  •  temazepam (RESTORIL) cap 15 mg, 15 mg, Or Laboratory Data:  Recent Labs   Lab 04/29/21  0558   RBC 4.51   HGB 13.6   HCT 39.8   MCV 88.2   MCH 30.2   MCHC 34.2   RDW 12.8   NEPRELIM 7.05   WBC 10.5   .0       Impression and Plan:  Right hallux dry gangrene  PAD  DM, uncontrolled       of life, and the possibility that future surgery may need to be performed. The patient was given the opportunity to ask questions which were answered. The patient voiced no concerns and will consider all these options.  Patient desires surgical intervention

## 2021-04-30 ENCOUNTER — ANESTHESIA EVENT (OUTPATIENT)
Dept: SURGERY | Facility: HOSPITAL | Age: 51
DRG: 253 | End: 2021-04-30

## 2021-04-30 ENCOUNTER — TELEPHONE (OUTPATIENT)
Dept: PODIATRY CLINIC | Facility: CLINIC | Age: 51
End: 2021-04-30

## 2021-04-30 ENCOUNTER — ANESTHESIA (OUTPATIENT)
Dept: SURGERY | Facility: HOSPITAL | Age: 51
DRG: 253 | End: 2021-04-30

## 2021-04-30 VITALS
HEIGHT: 72 IN | HEART RATE: 73 BPM | DIASTOLIC BLOOD PRESSURE: 58 MMHG | WEIGHT: 182 LBS | TEMPERATURE: 99 F | RESPIRATION RATE: 14 BRPM | OXYGEN SATURATION: 96 % | BODY MASS INDEX: 24.65 KG/M2 | SYSTOLIC BLOOD PRESSURE: 99 MMHG

## 2021-04-30 PROCEDURE — 0Y6P0Z0 DETACHMENT AT RIGHT 1ST TOE, COMPLETE, OPEN APPROACH: ICD-10-PCS | Performed by: PODIATRIST

## 2021-04-30 PROCEDURE — 28820 AMPUTATION OF TOE: CPT | Performed by: PODIATRIST

## 2021-04-30 RX ORDER — ATORVASTATIN CALCIUM 40 MG/1
40 TABLET, FILM COATED ORAL NIGHTLY
Qty: 30 TABLET | Refills: 0 | Status: SHIPPED | OUTPATIENT
Start: 2021-04-30

## 2021-04-30 RX ORDER — HALOPERIDOL 5 MG/ML
0.25 INJECTION INTRAMUSCULAR ONCE AS NEEDED
Status: DISCONTINUED | OUTPATIENT
Start: 2021-04-30 | End: 2021-04-30 | Stop reason: HOSPADM

## 2021-04-30 RX ORDER — NALOXONE HYDROCHLORIDE 0.4 MG/ML
80 INJECTION, SOLUTION INTRAMUSCULAR; INTRAVENOUS; SUBCUTANEOUS AS NEEDED
Status: DISCONTINUED | OUTPATIENT
Start: 2021-04-30 | End: 2021-04-30 | Stop reason: HOSPADM

## 2021-04-30 RX ORDER — ASPIRIN 81 MG/1
81 TABLET, CHEWABLE ORAL DAILY
Qty: 30 TABLET | Refills: 0 | Status: SHIPPED | OUTPATIENT
Start: 2021-05-01

## 2021-04-30 RX ORDER — MORPHINE SULFATE 2 MG/ML
2 INJECTION, SOLUTION INTRAMUSCULAR; INTRAVENOUS EVERY 10 MIN PRN
Status: DISCONTINUED | OUTPATIENT
Start: 2021-04-30 | End: 2021-04-30 | Stop reason: HOSPADM

## 2021-04-30 RX ORDER — HYDROMORPHONE HYDROCHLORIDE 1 MG/ML
0.2 INJECTION, SOLUTION INTRAMUSCULAR; INTRAVENOUS; SUBCUTANEOUS EVERY 5 MIN PRN
Status: DISCONTINUED | OUTPATIENT
Start: 2021-04-30 | End: 2021-04-30 | Stop reason: HOSPADM

## 2021-04-30 RX ORDER — CLINDAMYCIN HYDROCHLORIDE 300 MG/1
300 CAPSULE ORAL 3 TIMES DAILY
Qty: 42 CAPSULE | Refills: 0 | Status: SHIPPED | OUTPATIENT
Start: 2021-04-30 | End: 2021-05-14 | Stop reason: ALTCHOICE

## 2021-04-30 RX ORDER — DEXTROSE MONOHYDRATE 25 G/50ML
50 INJECTION, SOLUTION INTRAVENOUS
Status: DISCONTINUED | OUTPATIENT
Start: 2021-04-30 | End: 2021-04-30 | Stop reason: HOSPADM

## 2021-04-30 RX ORDER — ONDANSETRON 2 MG/ML
4 INJECTION INTRAMUSCULAR; INTRAVENOUS ONCE AS NEEDED
Status: DISCONTINUED | OUTPATIENT
Start: 2021-04-30 | End: 2021-04-30 | Stop reason: HOSPADM

## 2021-04-30 RX ORDER — CLOPIDOGREL BISULFATE 75 MG/1
75 TABLET ORAL DAILY
Qty: 30 TABLET | Refills: 0 | Status: SHIPPED | OUTPATIENT
Start: 2021-05-01 | End: 2021-06-30

## 2021-04-30 RX ORDER — MORPHINE SULFATE 4 MG/ML
4 INJECTION, SOLUTION INTRAMUSCULAR; INTRAVENOUS EVERY 10 MIN PRN
Status: DISCONTINUED | OUTPATIENT
Start: 2021-04-30 | End: 2021-04-30 | Stop reason: HOSPADM

## 2021-04-30 RX ORDER — LIDOCAINE HYDROCHLORIDE 10 MG/ML
INJECTION, SOLUTION EPIDURAL; INFILTRATION; INTRACAUDAL; PERINEURAL AS NEEDED
Status: DISCONTINUED | OUTPATIENT
Start: 2021-04-30 | End: 2021-04-30 | Stop reason: SURG

## 2021-04-30 RX ORDER — HYDROCODONE BITARTRATE AND ACETAMINOPHEN 5; 325 MG/1; MG/1
1 TABLET ORAL AS NEEDED
Status: DISCONTINUED | OUTPATIENT
Start: 2021-04-30 | End: 2021-04-30 | Stop reason: HOSPADM

## 2021-04-30 RX ORDER — HYDROMORPHONE HYDROCHLORIDE 1 MG/ML
0.4 INJECTION, SOLUTION INTRAMUSCULAR; INTRAVENOUS; SUBCUTANEOUS EVERY 5 MIN PRN
Status: DISCONTINUED | OUTPATIENT
Start: 2021-04-30 | End: 2021-04-30 | Stop reason: HOSPADM

## 2021-04-30 RX ORDER — HYDROCODONE BITARTRATE AND ACETAMINOPHEN 5; 325 MG/1; MG/1
2 TABLET ORAL AS NEEDED
Status: DISCONTINUED | OUTPATIENT
Start: 2021-04-30 | End: 2021-04-30 | Stop reason: HOSPADM

## 2021-04-30 RX ORDER — BUPIVACAINE HYDROCHLORIDE 2.5 MG/ML
INJECTION, SOLUTION EPIDURAL; INFILTRATION; INTRACAUDAL AS NEEDED
Status: DISCONTINUED | OUTPATIENT
Start: 2021-04-30 | End: 2021-04-30

## 2021-04-30 RX ORDER — HYDROMORPHONE HYDROCHLORIDE 1 MG/ML
0.6 INJECTION, SOLUTION INTRAMUSCULAR; INTRAVENOUS; SUBCUTANEOUS EVERY 5 MIN PRN
Status: DISCONTINUED | OUTPATIENT
Start: 2021-04-30 | End: 2021-04-30 | Stop reason: HOSPADM

## 2021-04-30 RX ORDER — SODIUM CHLORIDE, SODIUM LACTATE, POTASSIUM CHLORIDE, CALCIUM CHLORIDE 600; 310; 30; 20 MG/100ML; MG/100ML; MG/100ML; MG/100ML
INJECTION, SOLUTION INTRAVENOUS CONTINUOUS
Status: DISCONTINUED | OUTPATIENT
Start: 2021-04-30 | End: 2021-04-30 | Stop reason: HOSPADM

## 2021-04-30 RX ORDER — SODIUM CHLORIDE 9 MG/ML
INJECTION, SOLUTION INTRAVENOUS CONTINUOUS PRN
Status: DISCONTINUED | OUTPATIENT
Start: 2021-04-30 | End: 2021-04-30 | Stop reason: SURG

## 2021-04-30 RX ORDER — MORPHINE SULFATE 10 MG/ML
6 INJECTION, SOLUTION INTRAMUSCULAR; INTRAVENOUS EVERY 10 MIN PRN
Status: DISCONTINUED | OUTPATIENT
Start: 2021-04-30 | End: 2021-04-30 | Stop reason: HOSPADM

## 2021-04-30 RX ORDER — LIDOCAINE HYDROCHLORIDE 10 MG/ML
INJECTION, SOLUTION EPIDURAL; INFILTRATION; INTRACAUDAL; PERINEURAL AS NEEDED
Status: DISCONTINUED | OUTPATIENT
Start: 2021-04-30 | End: 2021-04-30

## 2021-04-30 RX ORDER — MIDAZOLAM HYDROCHLORIDE 1 MG/ML
INJECTION INTRAMUSCULAR; INTRAVENOUS AS NEEDED
Status: DISCONTINUED | OUTPATIENT
Start: 2021-04-30 | End: 2021-04-30 | Stop reason: SURG

## 2021-04-30 RX ORDER — PROCHLORPERAZINE EDISYLATE 5 MG/ML
5 INJECTION INTRAMUSCULAR; INTRAVENOUS ONCE AS NEEDED
Status: DISCONTINUED | OUTPATIENT
Start: 2021-04-30 | End: 2021-04-30 | Stop reason: HOSPADM

## 2021-04-30 RX ADMIN — SODIUM CHLORIDE: 9 INJECTION, SOLUTION INTRAVENOUS at 07:43:00

## 2021-04-30 RX ADMIN — LIDOCAINE HYDROCHLORIDE 50 MG: 10 INJECTION, SOLUTION EPIDURAL; INFILTRATION; INTRACAUDAL; PERINEURAL at 07:48:00

## 2021-04-30 RX ADMIN — MIDAZOLAM HYDROCHLORIDE 2 MG: 1 INJECTION INTRAMUSCULAR; INTRAVENOUS at 07:43:00

## 2021-04-30 RX ADMIN — SODIUM CHLORIDE: 9 INJECTION, SOLUTION INTRAVENOUS at 08:29:00

## 2021-04-30 NOTE — PLAN OF CARE
No acute changes during shift. Patient went for right great toe amputation this morning. Patient was cleared for discharge by MAURI Wright and Podiatrist. Safety measures are in place.     Problem: Patient Centered Care  Goal: Patient preferences are i Monitor blood glucose levels  - Administer medications per order  - Follow MD orders  - Diagnostics per order  - Update / inform patient on plan of care  - See additional Care Plan goals for specific interventions  Outcome: Progressing     Problem: PAIN - appropriate resources  Description: INTERVENTIONS:  - Identify barriers to discharge w/pt and caregiver  - Include patient/family/discharge partner in discharge planning  - Arrange for needed discharge resources and transportation as appropriate  - Identif

## 2021-04-30 NOTE — PROGRESS NOTES
[facility] AND [facility] Infectious Disease Progress Note    Peng Marvin Patient Status:  Inpatient    1970 MRN [id number]   Location [facility] 5SW/SE Attending Laurel Gupta MD   Hosp Day # 6 PCP None Pcp     Subjective:  Pt states marysol °C), Max:99.2 °F (37.3 °C)      HEENT: Exam is unremarkable. Without scleral icterus. Mucous membranes are moist. PERRLA. Oropharynx is clear. Neck: No tenderness to palpitation.   Full range of motion to flexion and extension, lateral rotation and late

## 2021-04-30 NOTE — PLAN OF CARE
Patient's peripheral IV was removed. AVS was printed. Patient verbalized understanding of follow up appointment need in two weeks. Patient understands to  prescriptions. Patient's family member provided transport back home.     Problem: Patient Alannah goals for specific interventions  4/30/2021 1444 by Alexandre Vivas RN  Outcome: Completed  4/30/2021 1443 by Alexandre Vivas RN  Outcome: Progressing  Goal: Patient/Family Short Term Goal  Description: Patient's Short Term Goal: decreased pain right foot Assess pt frequently for physical needs  - Identify cognitive and physical deficits and behaviors that affect risk of falls.   - [place] fall precautions as indicated by assessment.  - Educate pt/family on patient safety including physical limitations  - care as needed  4/30/2021 1444 by Norma Morgan RN  Outcome: Completed  4/30/2021 1443 by Norma Morgan, RN  Outcome: Progressing  Goal: Incision(s), wounds(s) or drain site(s) healing without S/S of infection  Description: INTERVENTIONS:  - Assess and do

## 2021-04-30 NOTE — TELEPHONE ENCOUNTER
Please call patient and set up appt for 1-2 weeks. Pt is s/p right hallux amputation and will be discharged from hospital today.

## 2021-04-30 NOTE — ANESTHESIA PREPROCEDURE EVALUATION
Anesthesia PreOp Note    HPI:     Shahida Garcia is a 49year old male who presents for preoperative consultation requested by: Maritza Shelton DPM    Date of Surgery: 4/23/2021 - 4/30/2021    Procedure(s):  right hallux amputation versus possible par Last Rate: 200 mL/hr at 04/29/21 1814, 1 g at 04/29/21 1814  [MAR Hold] glucose (DEX4) oral liquid 15 g, 15 g, Oral, Q15 Min PRN, Lauren Betancur DO   Or  [MAR Hold] Glucose-Vitamin C (DEX-4) chewable tab 4 tablet, 4 tablet, Oral, Q15 Min PRN, Lydia A 7-19 GM/118ML enema 133 mL, 1 enema, Rectal, Once PRN, Acelenard Gravel, DO  [facility] Hold] ondansetron HCl (ZOFRAN) injection 4 mg, 4 mg, Intravenous, Q6H PRN, Acelenard Lanceel, DO    Cefadroxil 500 MG Oral Cap, Take 1 capsule (500 mg total) by mouth 2 (two)     Fear of Current or Ex-Partner:       Emotionally Abused:       Physically Abused:       Sexually Abused:     Available pre-op labs reviewed.   Lab Results   Component Value Date    WBC 10.5 04/29/2021    RBC 4.51 04/29/2021    HGB 13.6 04/29/2021    H legal guardian or family member of the nature of the anesthetic plan, benefits, risks including possible dental damage if relevant, major complications, and any alternative forms of anesthetic management.    All of the patient's questions were answered to t

## 2021-04-30 NOTE — PLAN OF CARE
No acute changes overnight. PRN dilaudid for toe pain. Plan for toe amputation in am. NPO at midnight. VSS. Will continue to monitor.      Problem: Patient Centered Care  Goal: Patient preferences are identified and integrated in the patient's plan of care per order  - Follow MD orders  - Diagnostics per order  - Update / inform patient on plan of care  - See additional Care Plan goals for specific interventions  Outcome: Progressing     Problem: PAIN - ADULT  Goal: Verbalizes/displays adequate comfort level Identify barriers to discharge w/pt and caregiver  - Include patient/family/discharge partner in discharge planning  - Arrange for needed discharge resources and transportation as appropriate  - Identify discharge learning needs (meds, wound care, etc)  -

## 2021-04-30 NOTE — ANESTHESIA POSTPROCEDURE EVALUATION
Patient: Aram Mancilla    Procedure Summary     Date: 04/30/21 Room / Location: [address] [place] [address] MAIN OR 04 / [address] [place] [address] MAIN OR    Anesthesia Start: 8442 Anesthesia Stop: 2027    Procedure: total right hallux amputation (Right Foot) Diagnosis:       Gangrene ([facility] [address]      (ga

## 2021-04-30 NOTE — OPERATIVE REPORT
OPERATIVE REPORT     Jim Estrada Patient Status:  Inpatient    1970 MRN [id number]   Location [facility] PRE OP RECOVERY Attending Bowen Shankar MD   [facility] Day # 6 PCP None Pcp     Date of Surgery:  21     Preoperative Diagnosis lidocaine plain and 0.5% marcaine plain. The foot was then scrubbed, prepped, and draped in the usual aseptic manner. Attention was directed to the patient's right hallux which did appear to be dry gangrenous in nature.   Utilizing a #15 blade a racquet

## 2021-05-01 NOTE — DISCHARGE SUMMARY
[facility] Internal Medicine Discharge Summary   Patient ID:  Joshua Britt  [id number]  49year old  2/24/1970    Admit date: 4/23/2021    Discharge date and time: 4/30/2021  3:04 PM      Attending Physician: No att. providers found     Primary Care Physician: Thomas Lopes [id number]    Phone: [phone]   · aspirin 81 MG Chew  · atorvastatin 40 MG Tabs  · Cefadroxil 500 MG Caps  · Clindamycin HCl 300 MG Caps  · Clopidogrel Bisulfate 75 MG Tabs  · metFORMIN HCl 500 MG Tabs     You can get these medications from any pharmacy includes an Addendum and supersedes previous reports for this exam.    PROCEDURE: XR FOOT, COMPLETE (MIN 3 VIEWS), RIGHT (CPT=73630)  COMPARISON: None. INDICATIONS: Right foot pain and distal swelling post dropping maria l on foot x2 weeks ago.   TECHNIQUE: 10:06 PM     Finalized by (CST): Adria Fabry, MD on 4/23/2021 at 10:07 PM          MRI FOOT (W+WO), RIGHT (CPT=73720)    Result Date: 4/24/2021  PROCEDURE: MRI FOOT (W+WO), RIGHT (CPT=73720)  COMPARISON: [facility], XR FOOT, COMPLETE (MIN 3 LOWER EXT BILAT DOPPLER W SEG PRESSURES (QDW=83895)    Result Date: 4/26/2021  PROCEDURE: US ART LOWER EXT BILAT DOPPLER W SEG PRESSURES (DGV=52061)  INDICATIONS: Right hallux wound  TECHNIQUE: Segmental pressures and Doppler wave forms were obtained in th

## 2021-05-03 ENCOUNTER — PATIENT OUTREACH (OUTPATIENT)
Dept: CASE MANAGEMENT | Age: 51
End: 2021-05-03

## 2021-05-03 ENCOUNTER — TELEMEDICINE (OUTPATIENT)
Dept: INTERNAL MEDICINE CLINIC | Facility: CLINIC | Age: 51
End: 2021-05-03

## 2021-05-03 DIAGNOSIS — I96 GANGRENE OF TOE OF RIGHT FOOT (HCC): ICD-10-CM

## 2021-05-03 DIAGNOSIS — S92.411D CLOSED DISPLACED FRACTURE OF PROXIMAL PHALANX OF RIGHT GREAT TOE WITH ROUTINE HEALING, SUBSEQUENT ENCOUNTER: ICD-10-CM

## 2021-05-03 DIAGNOSIS — I73.9 PERIPHERAL VASCULAR DISEASE (HCC): Primary | ICD-10-CM

## 2021-05-03 DIAGNOSIS — E11.65 UNCONTROLLED TYPE 2 DIABETES MELLITUS WITH HYPERGLYCEMIA (HCC): ICD-10-CM

## 2021-05-03 DIAGNOSIS — L03.115 CELLULITIS OF RIGHT LOWER EXTREMITY: ICD-10-CM

## 2021-05-03 PROCEDURE — 99495 TRANSJ CARE MGMT MOD F2F 14D: CPT | Performed by: NURSE PRACTITIONER

## 2021-05-03 RX ORDER — HYDROCODONE BITARTRATE AND ACETAMINOPHEN 5; 325 MG/1; MG/1
1 TABLET ORAL EVERY 8 HOURS PRN
Status: ON HOLD | COMMUNITY
End: 2021-05-21

## 2021-05-03 RX ORDER — ACETAMINOPHEN 500 MG
TABLET ORAL EVERY 6 HOURS PRN
COMMUNITY

## 2021-05-03 NOTE — PROGRESS NOTES
TRANSITIONAL CARE CLINIC PHARMACIST MEDICATION RECONCILIATION        Virgen Haines MRN [id number]    1970 PCP None Pcp       Comments: Medication history completed by the [facility] [place] Pharmacist with the patient using two-way, real ti blood sugars climb through the day to the upper 200's by dinner time. He says that when he was on Metformin previously that he lost 100 pounds and that is why he stopped taking it.   Understands that he needs to control his blood sugars, but will need ava

## 2021-05-03 NOTE — PROGRESS NOTES
[facility]      [facility]   CHIEF COMPLAINT:PVD, cellulitis of right lower extremity, gangrene of toe of right foot, nondisplaced fracture of right great toe, and uncontrolled DMII.     HPI: Lennon Carrel is a 48 mg total) by mouth 3 (three) times daily for 14 days. , Disp: 42 capsule, Rfl: 0  aspirin 81 MG Oral Chew Tab, Chew 1 tablet (81 mg total) by mouth daily. , Disp: 30 tablet, Rfl: 0  atorvastatin 40 MG Oral Tab, Take 1 tablet (40 mg total) by mouth nightly. , phalanx. Calcaneal enthesophyte. Hammertoe deformities.    Dictated by (CST): Maurice Molina MD on 4/23/2021 at 10:06 PM     Finalized by (CST): Maurice Molina MD on 4/23/2021 at 10:07 PM    ADDENDUM:  Corrected report to reflect presence of an acute articula  (H) 04/29/2021 05:58 AM     04/29/2021 05:58 AM    K 4.1 04/30/2021 06:01 AM     04/29/2021 05:58 AM    CO2 28.0 04/29/2021 05:58 AM    BUN 18 04/29/2021 05:58 AM    CREATSERUM 0.80 04/29/2021 05:58 AM    CA 8.9 04/29/2021 05:58 AM    M changed by podiatry  · Follow-up with podiatry on Dr. Letha Blunt 5/7 at 11:15am  · Follow-up with I/D Dr. Harris Aceves on 6/15    3.  DMII, uncontrolled  · Continue to check BS BID   · Not controlled-170-280's-A1C is 10.4%  · Started on Metformin BID at discharge Never done  COVID-19 Vaccine(1) Never done  FIT/FOBT Colorectal Screening Never done  Colonoscopy Never done  PSA Never done  Zoster Vaccines(1 of 2) Never done  Influenza Vaccine(Season Ended) due on 10/01/2021  Annual Depression Screen due on 04/24/2022 Alveda Ledger, DO [facility]          1. Transitional Care Clinic Visit: Discharged  2. PCP Visit: Referred to [facility]  3.   Chronic Care Management: N/A     DAVID Shepherd, 5/3/2021  [facility]  [phone]

## 2021-05-03 NOTE — PROGRESS NOTES
Patient completed Hospital Follow Up appt on 5-3-21 in [facility], which is within 2 business days of discharge. NCM closing encounter.

## 2021-05-03 NOTE — PROGRESS NOTES
Video Visit  [address]      Please note that the following visit was completed using two-way, real-time interactive audio and video communication.   This has been done in good lamont to provide continuity of care in the best Tab, Chew 1 tablet (81 mg total) by mouth daily. , Disp: 30 tablet, Rfl: 0  atorvastatin 40 MG Oral Tab, Take 1 tablet (40 mg total) by mouth nightly., Disp: 30 tablet, Rfl: 0  Clopidogrel Bisulfate 75 MG Oral Tab, Take 1 tablet (75 mg total) by mouth daily 4/23/2021 at 10:06 PM     Finalized by (CST): Khang Rosenberg MD on 4/23/2021 at 10:07 PM    ADDENDUM:  Corrected report to reflect presence of an acute articular fracture in the right 1st proximal phalanx.   Dictated by (CST): Khang Rosenberg MD on 4/23/2021 at 103 04/29/2021 05:58 AM    CO2 28.0 04/29/2021 05:58 AM    BUN 18 04/29/2021 05:58 AM    CREATSERUM 0.80 04/29/2021 05:58 AM    CA 8.9 04/29/2021 05:58 AM    MG 2.2 04/29/2021 05:58 AM    A1C 10.4 (H) 04/23/2021 09:50 PM         There is no immunization hi Rfl: 0  aspirin 81 MG Oral Chew Tab, Chew 1 tablet (81 mg total) by mouth daily. , Disp: 30 tablet, Rfl: 0  atorvastatin 40 MG Oral Tab, Take 1 tablet (40 mg total) by mouth nightly., Disp: 30 tablet, Rfl: 0  Clopidogrel Bisulfate 75 MG Oral Tab, Take 1 tab scheduling required follow-up with community providers and services. \"}    Medication Reconciliation:  I am aware of an inpatient discharge within the last 30 days.   The discharge medication list has been reconciled with the patient's current medication li Infectious Disease - [address], [place] ([address] [place] [address])    For the safety of our patients, visitors and care team, we are asking patients not to bring anyone with them to their appointment, unless clinically required.             Kun Ball co-insurance and/or co-pays associated with this service. HISTORY   CHIEF COMPLAINT: No chief complaint on file. HPI: Jessica Rivera is a 49year old male here today for hospital follow up for ***.   Jessica Rivera was discharged from {Discharged f Smokeless tobacco: Never Used    Alcohol use: Yes    Drug use: Not on file       Imaging & Consults:  XR FOOT, COMPLETE (MIN 3 VIEWS), RIGHT (CPT=73630)    Addendum Date: 4/23/2021    This report includes an Addendum and supersedes previous reports for [facility] to contusion, or reactive edema from soft tissue infection/cellulitis. 4. Small nondisplaced fracture medial corner base of great toe proximal phalanx at capsular attachment. 5. Subacute healing nondisplaced fracture of 2nd toe proximal phalanx.     Dictate EXAM:  There were no vitals filed for this visit.     GENERAL: well developed, well nourished, in no apparent distress, good historian  INTEGUMENTARY: warm, dry  HEENT: atraumatic, normocephalic,   MUSCULOSKELETAL: + ROM in all joints   NEURO: Oriented x3 done  Pneumococcal Vaccine: Birth to 64yrs(1 of 1 - PPSV23) Never done  Tobacco Cessation Counseling 2 Years Never done  COVID-19 Vaccine(1) Never done  FIT/FOBT Colorectal Screening Never done  Colonoscopy Never done  PSA Never done  Zoster Vaccines(1 of Significant Complications, Morbidity, and/or Mortality: {low/moderate/high:6984::\"moderate\"}    Overall Risk:   {If the Risk Level is Low or if over 14 days Post Discharge use the Standard E&M LOS Coding, Not TCM.  Elements for Each Level of Medical Decis positive patients will receive QOD follow up per hospital guidelines. The patient is being discharged from the [address] [place] [address],[address]. The PCP office or COVID Nurse Team will assume responsibility for follow up with this patient.   Pooja Simmons, APRN, 5/4/2021  Gabriella Wesley current facility-administered medications for this visit. HISTORY: reconciled and reviewed with patient  No past medical history on file. No past surgical history on file. No family history on file.    Social History    Tobacco Use      Smoking sta (CST): Aaron Trent MD on 4/23/2021 at 10:07 PM          MRI FOOT (W+WO), RIGHT (CPT=73720)    Result Date: 4/24/2021  CONCLUSION:  1. No evidence of osteomyelitis or abscess. 2. Soft tissue edema either related to contusion or cellulitis.  3. Bone edema of exertion  CARDIOVASCULAR: denies syncope, chest pain, fatigue, palpitations   GI: denies abdominal pain, melena, constipation, diarrhea, nausea, vomiting   MUSCULOSKELETAL: denies pain, states normal range of motion of extremities  NEUROLOGIC: denies confu tablet, Rfl: 0  atorvastatin 40 MG Oral Tab, Take 1 tablet (40 mg total) by mouth nightly., Disp: 30 tablet, Rfl: 0  Clopidogrel Bisulfate 75 MG Oral Tab, Take 1 tablet (75 mg total) by mouth daily. , Disp: 30 tablet, Rfl: 0  Cefadroxil 500 MG Oral Cap, Charli Reconciliation:  I am aware of an inpatient discharge within the last 30 days. The discharge medication list has been reconciled with the patient's current medication list and reviewed by me.   See medication list for additions of new medication, and salomon safety of our patients, visitors and care team, we are asking patients not to bring anyone with them to their appointment, unless clinically required.             [address] [facility], [address] [facility]  [address]  metFORMIN HCl 500 MG Oral Tab, Take 1 tablet (500 mg total) by mouth 2 (two) times daily. , Disp: 60 tablet, Rfl: 0    No current facility-administered medications for this visit.        Lab Results   Component Value Date/Time    WBC 10.5 04/29/2021 05:58 EXAM:  There were no vitals taken for this visit.    GENERAL: well developed, well nourished, in no apparent distress  INTEGUMENTARY: warm, dry, no rashes, no suspicious lesions  HEENT: atraumatic, normocephalic, PERRL, EOMI, sclera white, conjunctivae pink 1 tablet (40 mg total) by mouth nightly. Clopidogrel Bisulfate 75 MG Oral Tab, Take 1 tablet (75 mg total) by mouth daily. Cefadroxil 500 MG Oral Cap, Take 1 capsule (500 mg total) by mouth 2 (two) times daily for 14 days.   metFORMIN HCl 500 MG Oral Tab, Take 1 tablet (40 mg total) by mouth nightly., Disp: 30 tablet, Rfl: 0  Clopidogrel Bisulfate 75 MG Oral Tab, Take 1 tablet (75 mg total) by mouth daily. , Disp: 30 tablet, Rfl: 0  Cefadroxil 500 MG Oral Cap, Take 1 capsule (500 mg total) by mouth 2 (two) t report to reflect presence of an acute articular fracture in the right 1st proximal phalanx.   Dictated by (CST): Alivia Sibley MD on 4/23/2021 at 10:08 PM     Finalized by (CST): Alivia Sibley MD on 4/23/2021 at 10:09 PM             Result Date: 4/23/2021  C 04/29/2021 05:58 AM    CA 8.9 04/29/2021 05:58 AM    MG 2.2 04/29/2021 05:58 AM    A1C 10.4 (H) 04/23/2021 09:50 PM         There is no immunization history on file for this patient.     ROS:  GENERAL: energy stable, denies fever, weakness  SKIN: denies any 500-1,000 mg by mouth every 6 (six) hours as needed for Pain., Disp: , Rfl:   Clindamycin HCl 300 MG Oral Cap, Take 1 capsule (300 mg total) by mouth 3 (three) times daily for 14 days. , Disp: 42 capsule, Rfl: 0  aspirin 81 MG Oral Chew Tab, Chew 1 tablet ( TCM:7097::\"Referrals as listed below in orders\",\"Assisted in scheduling required follow-up with community providers and services. \"}    Medication Reconciliation:  I am aware of an inpatient discharge within the last 30 days.   The discharge medication l

## 2021-05-07 ENCOUNTER — OFFICE VISIT (OUTPATIENT)
Dept: PODIATRY CLINIC | Facility: CLINIC | Age: 51
End: 2021-05-07

## 2021-05-07 DIAGNOSIS — Z98.890 PERIPHERAL ARTERIAL DISEASE WITH HISTORY OF REVASCULARIZATION (HCC): Primary | ICD-10-CM

## 2021-05-07 DIAGNOSIS — Z89.419 HISTORY OF AMPUTATION OF GREAT TOE (HCC): ICD-10-CM

## 2021-05-07 DIAGNOSIS — I73.9 PERIPHERAL ARTERIAL DISEASE WITH HISTORY OF REVASCULARIZATION (HCC): Primary | ICD-10-CM

## 2021-05-07 PROCEDURE — 99024 POSTOP FOLLOW-UP VISIT: CPT | Performed by: PODIATRIST

## 2021-05-07 PROCEDURE — L3260 AMBULATORY SURGICAL BOOT EAC: HCPCS | Performed by: PODIATRIST

## 2021-05-07 NOTE — PROGRESS NOTES
[facility], [facility] Podiatry  Progress Note    Thierno Beth is a 49year old male.  Patient presents with:  Post-Op: Right hallux total amputation - 1st visit - had sx on 4/30/21 - states he feels some pressure but he has no more pain - the dressing is ithe level: Not on file    Tobacco Use      Smoking status: Current Every Day Smoker      Smokeless tobacco: Never Used    Substance and Sexual Activity      Alcohol use: Yes          REVIEW OF SYSTEMS:   Denies nausea, fever, chills  No calf pain  Denies chest continue to be uncontrolled. Evaluated right hallux amputation site which is healing but there is a small central eschar noted along the incision site concerning for early necrotic changes. Pt has appt with vascular, Dr. Giovanni Laughlin next week.       Pt

## 2021-05-14 ENCOUNTER — OFFICE VISIT (OUTPATIENT)
Dept: PODIATRY CLINIC | Facility: CLINIC | Age: 51
End: 2021-05-14

## 2021-05-14 DIAGNOSIS — E11.65 UNCONTROLLED TYPE 2 DIABETES MELLITUS WITH HYPERGLYCEMIA (HCC): ICD-10-CM

## 2021-05-14 DIAGNOSIS — I73.9 PERIPHERAL ARTERIAL DISEASE WITH HISTORY OF REVASCULARIZATION (HCC): Primary | ICD-10-CM

## 2021-05-14 DIAGNOSIS — I96 GANGRENE OF RIGHT FOOT (HCC): ICD-10-CM

## 2021-05-14 DIAGNOSIS — Z98.890 PERIPHERAL ARTERIAL DISEASE WITH HISTORY OF REVASCULARIZATION (HCC): Primary | ICD-10-CM

## 2021-05-14 DIAGNOSIS — Z89.419 HISTORY OF AMPUTATION OF GREAT TOE (HCC): ICD-10-CM

## 2021-05-14 PROCEDURE — 99024 POSTOP FOLLOW-UP VISIT: CPT | Performed by: PODIATRIST

## 2021-05-14 NOTE — PROGRESS NOTES
[address] [facility] Podiatry  Progress Note    Jessica Rivera is a 49year old male.  Patient presents with:  Post-Op: s/p R hallux amputation f/u - had sx on 4/30/21 - states he is good - he was seen by vascular yesterday - this AM his OT=075 - he states he f SYSTEMS:   Denies nausea, fever, chills  No calf pain  Denies chest pain or shortness of breath. EXAM:   There were no vitals taken for this visit. Constitutional:   Patient in no apparent distress. Well kept. Of normal body habitus.  Alert and orie changes, no acute infection is evident at this time, but concerned since wound now probes to bone. Due to gangrene and PTB pt most likely needs partial 1st ray amputation. Per Vascular pt is well perfused.   Pt most likely has small vessel disease due

## 2021-05-17 ENCOUNTER — HOSPITAL ENCOUNTER (INPATIENT)
Facility: HOSPITAL | Age: 51
LOS: 4 days | Discharge: HOME | DRG: 240 | End: 2021-05-21
Attending: EMERGENCY MEDICINE | Admitting: HOSPITALIST

## 2021-05-17 ENCOUNTER — APPOINTMENT (OUTPATIENT)
Dept: GENERAL RADIOLOGY | Facility: HOSPITAL | Age: 51
DRG: 240 | End: 2021-05-17
Attending: EMERGENCY MEDICINE

## 2021-05-17 ENCOUNTER — ANESTHESIA EVENT (OUTPATIENT)
Dept: SURGERY | Facility: HOSPITAL | Age: 51
DRG: 240 | End: 2021-05-17

## 2021-05-17 DIAGNOSIS — E11.65 TYPE 2 DIABETES MELLITUS WITH HYPERGLYCEMIA, WITHOUT LONG-TERM CURRENT USE OF INSULIN (HCC): ICD-10-CM

## 2021-05-17 DIAGNOSIS — I96 DRY GANGRENE (HCC): Primary | ICD-10-CM

## 2021-05-17 PROCEDURE — 73630 X-RAY EXAM OF FOOT: CPT | Performed by: EMERGENCY MEDICINE

## 2021-05-17 PROCEDURE — 99024 POSTOP FOLLOW-UP VISIT: CPT | Performed by: PODIATRIST

## 2021-05-17 RX ORDER — HEPARIN SODIUM 5000 [USP'U]/ML
5000 INJECTION, SOLUTION INTRAVENOUS; SUBCUTANEOUS EVERY 8 HOURS SCHEDULED
Status: DISCONTINUED | OUTPATIENT
Start: 2021-05-18 | End: 2021-05-21

## 2021-05-17 RX ORDER — MORPHINE SULFATE 4 MG/ML
4 INJECTION, SOLUTION INTRAMUSCULAR; INTRAVENOUS EVERY 2 HOUR PRN
Status: DISCONTINUED | OUTPATIENT
Start: 2021-05-17 | End: 2021-05-20

## 2021-05-17 RX ORDER — PROCHLORPERAZINE EDISYLATE 5 MG/ML
5 INJECTION INTRAMUSCULAR; INTRAVENOUS EVERY 8 HOURS PRN
Status: DISCONTINUED | OUTPATIENT
Start: 2021-05-17 | End: 2021-05-21

## 2021-05-17 RX ORDER — ACETAMINOPHEN 325 MG/1
650 TABLET ORAL EVERY 4 HOURS PRN
Status: DISCONTINUED | OUTPATIENT
Start: 2021-05-17 | End: 2021-05-21

## 2021-05-17 RX ORDER — ONDANSETRON 2 MG/ML
4 INJECTION INTRAMUSCULAR; INTRAVENOUS EVERY 6 HOURS PRN
Status: DISCONTINUED | OUTPATIENT
Start: 2021-05-17 | End: 2021-05-21

## 2021-05-17 RX ORDER — MORPHINE SULFATE 4 MG/ML
INJECTION, SOLUTION INTRAMUSCULAR; INTRAVENOUS
Status: DISPENSED
Start: 2021-05-17 | End: 2021-05-17

## 2021-05-17 RX ORDER — MORPHINE SULFATE 2 MG/ML
1 INJECTION, SOLUTION INTRAMUSCULAR; INTRAVENOUS EVERY 2 HOUR PRN
Status: DISCONTINUED | OUTPATIENT
Start: 2021-05-17 | End: 2021-05-20

## 2021-05-17 RX ORDER — KETOROLAC TROMETHAMINE 15 MG/ML
15 INJECTION, SOLUTION INTRAMUSCULAR; INTRAVENOUS ONCE
Status: COMPLETED | OUTPATIENT
Start: 2021-05-17 | End: 2021-05-17

## 2021-05-17 RX ORDER — HYDROCODONE BITARTRATE AND ACETAMINOPHEN 5; 325 MG/1; MG/1
2 TABLET ORAL EVERY 4 HOURS PRN
Status: DISCONTINUED | OUTPATIENT
Start: 2021-05-17 | End: 2021-05-19

## 2021-05-17 RX ORDER — HYDROCODONE BITARTRATE AND ACETAMINOPHEN 5; 325 MG/1; MG/1
1 TABLET ORAL EVERY 4 HOURS PRN
Status: DISCONTINUED | OUTPATIENT
Start: 2021-05-17 | End: 2021-05-19

## 2021-05-17 RX ORDER — MORPHINE SULFATE 2 MG/ML
2 INJECTION, SOLUTION INTRAMUSCULAR; INTRAVENOUS EVERY 2 HOUR PRN
Status: DISCONTINUED | OUTPATIENT
Start: 2021-05-17 | End: 2021-05-20

## 2021-05-17 RX ORDER — DEXTROSE MONOHYDRATE 25 G/50ML
50 INJECTION, SOLUTION INTRAVENOUS
Status: DISCONTINUED | OUTPATIENT
Start: 2021-05-17 | End: 2021-05-21

## 2021-05-17 NOTE — PLAN OF CARE
Patient alert and oriented, on room air. States pain to right foot, previous amputation. PRN pain medication given. IV abx continued. Tolerating general carb controled diet. Accuchecks ACHS. Voiding freely. No bowel movement throughout shift.  Weight bearin period  Description: INTERVENTIONS  - Monitor WBC  - Administer growth factors as ordered  - Implement neutropenic guidelines  Outcome: Progressing     Problem: SAFETY ADULT - FALL  Goal: Free from fall injury  Description: INTERVENTIONS:  - Assess pt freq specific interventions  Outcome: Progressing  Goal: Patient/Family Short Term Goal  Description: Patient's Short Term Goal: Pain management    Interventions:   - Monitor and assess pain  - Administer pain medications as needed  - See additional Care Plan g

## 2021-05-17 NOTE — ED QUICK NOTES
Orders for admission. Patient and/or next of kin aware of plan and is ready to go upstairs. Please call ED RN Liz Del Angel at listed extension should you have any further questions or concerns. Thank you.     Nurse and Rc Guerrier RN, [phone]    Chief Presentation: DANO

## 2021-05-17 NOTE — CONSULTS
[facility] AND [facility] Infectious Disease  Report of Consultation    Jamshid Platt Patient Status:  Inpatient    1970 MRN [id number]   Location [facility] 4W/SW/SE Attending Laisha Vizcaino MD   Hosp Day # 0 PCP None Pcp     Date of A Heparin Sodium (Porcine) 5000 UNIT/ML injection 5,000 Units, 5,000 Units, Subcutaneous, Q8H [address]  •  acetaminophen (TYLENOL) tab 650 mg, 650 mg, Oral, Q4H PRN **OR** HYDROcodone-acetaminophen (NORCO) 5-325 MG per tab 1 tablet, 1 tablet, Oral, Q4H PRN **OR** Resp SpO2 Height Weight   05/17/21 0911 118/76 97.8 °F (36.6 °C) Oral 76 18 100 % — —   05/17/21 0830 110/67 — — 73 18 97 % — —   05/17/21 0800 117/73 — — 78 20 96 % — —   05/17/21 0700 122/76 — — 80 20 99 % — —   05/17/21 0630 108/67 — — 78 21 100 % — — site  - Eschar noted, no drainage  - Low grade leukocytosis noted, will trend  - Podiatry to see - may need revision  - No obvious OM on XR    2.   Recent admission for complicated R foot cellulitis after trauma to R great toe with drainage  - XR with nondi

## 2021-05-17 NOTE — CONSULTS
[facility]    Report of Consultation    Ilana Scale Patient Status:  Inpatient    1970 MRN [id number]   Location [facility] 4W/SW/SE Attending Re Celestin MD   Hosp Day # 0 PCP None Pcp     Date of Admission:   (ZOFRAN) injection 4 mg, 4 mg, Intravenous, Q6H PRN  •  Prochlorperazine Edisylate (COMPAZINE) injection 5 mg, 5 mg, Intravenous, Q8H PRN  •  morphINE sulfate (PF) 2 MG/ML injection 1 mg, 1 mg, Intravenous, Q2H PRN **OR** morphINE sulfate (PF) 2 MG/ML inje Right foot gangrene with subcutaneous gas  2.  PAD Right LE  3. Leukocytosis       Arterial doppler did demonstrate mild to moderate PAD right LE with tib peroneal occlusive disease, Pt is s/p Right LE angioplasty.        Pt did see vascular Dr. Clark Nearing on formation, stiffness, limited motion, delayed-union, non-union, mal-union, impaired healing, increased chance of swelling, swelling, reaction to implanted biomaterials, over-correction, under-correction with recurrence of the deformities, continued pain, l RACHEL   5/17/2021  12:32 PM

## 2021-05-17 NOTE — H&P
[facility] Hospitalist H&P       CC: Patient presents with:   Foot Pain  Postop/Procedure Problem       PCP: None Pcp    Date of Admission: 5/17/2021  5:46 AM    ASSESSMENT / PLAN:     Mr. Roni Napier is a 50 yo M with PMH of recently diagnosed DM2, R toe gangrene Diagnosis Date   • Amputation of great toe, right, traumatic ([facility] 04/23/2021   • Diabetes ([facility]    • [facility] of [facility] of right foot ([facility]    • Heart attack ([facility]         [address] [place]  History reviewed. No pertinent surgical history.      ALL:  No Known Allergies FOOT, COMPLETE (MIN 3 VIEWS), RIGHT (CPT=73630)    Result Date: 5/17/2021  CONCLUSION:  1. No obvious osteomyelitis. 2. Status post great toe amputation. 3. Soft tissue swelling with gas in the operative site. Dictated by (CST):  Sonja Paez MD on

## 2021-05-17 NOTE — ED INITIAL ASSESSMENT (HPI)
Pt to ed for worsening foot pain s/p right great toe amputation. Pt sts he had his toe amputated on 5/7/2021 and was told to come in if his foot pain worsened or for any complications. Pt c/o \"worsening gangrene and pain. \"

## 2021-05-18 ENCOUNTER — ANESTHESIA (OUTPATIENT)
Dept: SURGERY | Facility: HOSPITAL | Age: 51
DRG: 240 | End: 2021-05-18

## 2021-05-18 PROCEDURE — 28810 AMPUTATION TOE & METATARSAL: CPT | Performed by: PODIATRIST

## 2021-05-18 PROCEDURE — 0Y6M0Z9 DETACHMENT AT RIGHT FOOT, PARTIAL 1ST RAY, OPEN APPROACH: ICD-10-PCS | Performed by: PODIATRIST

## 2021-05-18 RX ORDER — MORPHINE SULFATE 4 MG/ML
2 INJECTION, SOLUTION INTRAMUSCULAR; INTRAVENOUS EVERY 10 MIN PRN
Status: DISCONTINUED | OUTPATIENT
Start: 2021-05-18 | End: 2021-05-18 | Stop reason: HOSPADM

## 2021-05-18 RX ORDER — NALOXONE HYDROCHLORIDE 0.4 MG/ML
80 INJECTION, SOLUTION INTRAMUSCULAR; INTRAVENOUS; SUBCUTANEOUS AS NEEDED
Status: DISCONTINUED | OUTPATIENT
Start: 2021-05-18 | End: 2021-05-18 | Stop reason: HOSPADM

## 2021-05-18 RX ORDER — ONDANSETRON 2 MG/ML
INJECTION INTRAMUSCULAR; INTRAVENOUS AS NEEDED
Status: DISCONTINUED | OUTPATIENT
Start: 2021-05-18 | End: 2021-05-18 | Stop reason: SURG

## 2021-05-18 RX ORDER — MIDAZOLAM HYDROCHLORIDE 1 MG/ML
INJECTION INTRAMUSCULAR; INTRAVENOUS AS NEEDED
Status: DISCONTINUED | OUTPATIENT
Start: 2021-05-18 | End: 2021-05-18 | Stop reason: SURG

## 2021-05-18 RX ORDER — HYDROCODONE BITARTRATE AND ACETAMINOPHEN 5; 325 MG/1; MG/1
1 TABLET ORAL AS NEEDED
Status: DISCONTINUED | OUTPATIENT
Start: 2021-05-18 | End: 2021-05-18 | Stop reason: HOSPADM

## 2021-05-18 RX ORDER — BUPIVACAINE HYDROCHLORIDE 2.5 MG/ML
INJECTION, SOLUTION EPIDURAL; INFILTRATION; INTRACAUDAL AS NEEDED
Status: DISCONTINUED | OUTPATIENT
Start: 2021-05-18 | End: 2021-05-18 | Stop reason: HOSPADM

## 2021-05-18 RX ORDER — HYDROMORPHONE HYDROCHLORIDE 1 MG/ML
0.6 INJECTION, SOLUTION INTRAMUSCULAR; INTRAVENOUS; SUBCUTANEOUS EVERY 5 MIN PRN
Status: DISCONTINUED | OUTPATIENT
Start: 2021-05-18 | End: 2021-05-18 | Stop reason: HOSPADM

## 2021-05-18 RX ORDER — SODIUM CHLORIDE, SODIUM LACTATE, POTASSIUM CHLORIDE, CALCIUM CHLORIDE 600; 310; 30; 20 MG/100ML; MG/100ML; MG/100ML; MG/100ML
INJECTION, SOLUTION INTRAVENOUS CONTINUOUS PRN
Status: DISCONTINUED | OUTPATIENT
Start: 2021-05-18 | End: 2021-05-18 | Stop reason: SURG

## 2021-05-18 RX ORDER — HYDROCODONE BITARTRATE AND ACETAMINOPHEN 5; 325 MG/1; MG/1
2 TABLET ORAL AS NEEDED
Status: DISCONTINUED | OUTPATIENT
Start: 2021-05-18 | End: 2021-05-18 | Stop reason: HOSPADM

## 2021-05-18 RX ORDER — DEXTROSE MONOHYDRATE 25 G/50ML
50 INJECTION, SOLUTION INTRAVENOUS
Status: DISCONTINUED | OUTPATIENT
Start: 2021-05-18 | End: 2021-05-18 | Stop reason: HOSPADM

## 2021-05-18 RX ORDER — SENNOSIDES 8.6 MG
8.6 TABLET ORAL NIGHTLY
Status: DISCONTINUED | OUTPATIENT
Start: 2021-05-18 | End: 2021-05-21

## 2021-05-18 RX ORDER — MORPHINE SULFATE 4 MG/ML
4 INJECTION, SOLUTION INTRAMUSCULAR; INTRAVENOUS EVERY 10 MIN PRN
Status: DISCONTINUED | OUTPATIENT
Start: 2021-05-18 | End: 2021-05-18 | Stop reason: HOSPADM

## 2021-05-18 RX ORDER — ATORVASTATIN CALCIUM 40 MG/1
40 TABLET, FILM COATED ORAL NIGHTLY
Status: DISCONTINUED | OUTPATIENT
Start: 2021-05-18 | End: 2021-05-21

## 2021-05-18 RX ORDER — MORPHINE SULFATE 10 MG/ML
6 INJECTION, SOLUTION INTRAMUSCULAR; INTRAVENOUS EVERY 10 MIN PRN
Status: DISCONTINUED | OUTPATIENT
Start: 2021-05-18 | End: 2021-05-18 | Stop reason: HOSPADM

## 2021-05-18 RX ORDER — ASPIRIN 81 MG/1
81 TABLET, CHEWABLE ORAL DAILY
Status: DISCONTINUED | OUTPATIENT
Start: 2021-05-18 | End: 2021-05-21

## 2021-05-18 RX ORDER — POLYETHYLENE GLYCOL 3350 17 G/17G
17 POWDER, FOR SOLUTION ORAL DAILY PRN
Status: DISCONTINUED | OUTPATIENT
Start: 2021-05-18 | End: 2021-05-21

## 2021-05-18 RX ORDER — SODIUM CHLORIDE, SODIUM LACTATE, POTASSIUM CHLORIDE, CALCIUM CHLORIDE 600; 310; 30; 20 MG/100ML; MG/100ML; MG/100ML; MG/100ML
INJECTION, SOLUTION INTRAVENOUS CONTINUOUS
Status: DISCONTINUED | OUTPATIENT
Start: 2021-05-18 | End: 2021-05-18 | Stop reason: HOSPADM

## 2021-05-18 RX ORDER — LIDOCAINE HYDROCHLORIDE 10 MG/ML
INJECTION, SOLUTION EPIDURAL; INFILTRATION; INTRACAUDAL; PERINEURAL AS NEEDED
Status: DISCONTINUED | OUTPATIENT
Start: 2021-05-18 | End: 2021-05-18 | Stop reason: HOSPADM

## 2021-05-18 RX ORDER — DOCUSATE SODIUM 100 MG/1
100 CAPSULE, LIQUID FILLED ORAL 2 TIMES DAILY
Status: DISCONTINUED | OUTPATIENT
Start: 2021-05-18 | End: 2021-05-21

## 2021-05-18 RX ORDER — CLOPIDOGREL BISULFATE 75 MG/1
75 TABLET ORAL DAILY
Status: DISCONTINUED | OUTPATIENT
Start: 2021-05-18 | End: 2021-05-21

## 2021-05-18 RX ORDER — ONDANSETRON 2 MG/ML
4 INJECTION INTRAMUSCULAR; INTRAVENOUS ONCE AS NEEDED
Status: DISCONTINUED | OUTPATIENT
Start: 2021-05-18 | End: 2021-05-18 | Stop reason: HOSPADM

## 2021-05-18 RX ORDER — HYDROMORPHONE HYDROCHLORIDE 1 MG/ML
0.2 INJECTION, SOLUTION INTRAMUSCULAR; INTRAVENOUS; SUBCUTANEOUS EVERY 5 MIN PRN
Status: DISCONTINUED | OUTPATIENT
Start: 2021-05-18 | End: 2021-05-18 | Stop reason: HOSPADM

## 2021-05-18 RX ORDER — HYDROMORPHONE HYDROCHLORIDE 1 MG/ML
0.4 INJECTION, SOLUTION INTRAMUSCULAR; INTRAVENOUS; SUBCUTANEOUS EVERY 5 MIN PRN
Status: DISCONTINUED | OUTPATIENT
Start: 2021-05-18 | End: 2021-05-18 | Stop reason: HOSPADM

## 2021-05-18 RX ADMIN — SODIUM CHLORIDE, SODIUM LACTATE, POTASSIUM CHLORIDE, CALCIUM CHLORIDE: 600; 310; 30; 20 INJECTION, SOLUTION INTRAVENOUS at 07:32:00

## 2021-05-18 RX ADMIN — ONDANSETRON 4 MG: 2 INJECTION INTRAMUSCULAR; INTRAVENOUS at 08:08:00

## 2021-05-18 RX ADMIN — SODIUM CHLORIDE, SODIUM LACTATE, POTASSIUM CHLORIDE, CALCIUM CHLORIDE: 600; 310; 30; 20 INJECTION, SOLUTION INTRAVENOUS at 08:09:00

## 2021-05-18 RX ADMIN — MIDAZOLAM HYDROCHLORIDE 2 MG: 1 INJECTION INTRAMUSCULAR; INTRAVENOUS at 07:32:00

## 2021-05-18 NOTE — ANESTHESIA PREPROCEDURE EVALUATION
Anesthesia PreOp Note    HPI:     Virgen Haines is a 49year old male who presents for preoperative consultation requested by: Kimber Garcia DPM    Date of Surgery: 5/17/2021 - 5/18/2021    Procedure(s):  right partial 1st ray amputation  Indicatio total) by mouth daily. , Disp: 30 tablet, Rfl: 0  atorvastatin 40 MG Oral Tab, Take 1 tablet (40 mg total) by mouth nightly., Disp: 30 tablet, Rfl: 0  metFORMIN HCl 500 MG Oral Tab, Take 1 tablet (500 mg total) by mouth 2 (two) times daily. , Disp: 60 tablet Intravenous, Q2H PRN, Lily Gabriel MD, 2 mg at 05/17/21 2119   Or  [MAR Hold] morphINE sulfate (PF) 4 MG/ML injection 4 mg, 4 mg, Intravenous, Q2H PRN, Lily Gabriel MD, 4 mg at 05/18/21 0030  [MAR Hold] Insulin Aspart Pen (NOVOLOG) 100 UNIT/ML Organizations:       Attends Club or Organization Meetings:       Marital Status:   Intimate Partner Violence:       Fear of Current or Ex-Partner:       Emotionally Abused:       Physically Abused:       Sexually Abused:     Available pre-op labs reviewed Plan:   General and MAC  Post-op Pain Management: IV analgesics  Plan Comments: MAC with GA backup  Informed Consent Plan and Risks Discussed With:  Patient      I have informed Weeks Members and/or legal guardian or family member of the nature of the

## 2021-05-18 NOTE — ANESTHESIA POSTPROCEDURE EVALUATION
Patient: Khalida Colón    Procedure Summary     Date: 05/18/21 Room / Location: [address] [place] [address] MAIN OR 16 / [address] [place] [address] MAIN OR    Anesthesia Start: 2031 Anesthesia Stop: 9602    Procedure: right partial 1st ray amputation (Right Foot) Diagnosis: (gangrene)    Surgeons: Me

## 2021-05-18 NOTE — PLAN OF CARE
Patient alert and oriented, on room air. Right partal ray amputation of right foot completed in the morning. Patient states pain to right foot, PRN medication give. Dressing to right foot CDI. Patient tolerating low fiber soft diet, advance as tolerated.  A Monitor WBC  - Administer growth factors as ordered  - Implement neutropenic guidelines  Outcome: Progressing     Problem: SAFETY ADULT - FALL  Goal: Free from fall injury  Description: INTERVENTIONS:  - Assess pt frequently for physical needs  - Identify Progressing  Goal: Patient/Family Short Term Goal  Description: Patient's Short Term Goal: Pain management    Interventions:   - Monitor and assess pain  - Administer pain medications as needed  - See additional Care Plan goals for specific interventions

## 2021-05-18 NOTE — OPERATIVE REPORT
OPERATIVE REPORT     Lorelei Espinoza Patient Status:  Inpatient    1970 MRN [id number]   Location [facility] ANEST[facility]ES[facility]ARE UN[facility] Attending Carlos Panda MD   [facility] Day # 1 PCP None Pcp     Date of Surgery:  21    Preoperative Diagnos local anesthesia was obtained about the right foot utilizing 10 cc's of a 1:1 mixture of 1% lidocaine plain and 0.25% marcaine plain. The right foot was then scrubbed, prepped, and draped in the usual aseptic manner.     Attention was directed to the patien aspect of the incision site was not primarily closed and the wound was then packed with quarter inch iodoform packing.     Upon completion of the procedure, a postoperative block consisting of 12 cc's of 1:1 mixture of 0.25% Marcaine plain and 1% lidocaine

## 2021-05-18 NOTE — PROGRESS NOTES
[facility] Hospitalist Progress Note     CC: Hospital Follow up    PCP: None Pcp       Assessment/Plan:     Principal Problem:    Dry gangrene (HCC)  Active Problems:    Type 2 diabetes mellitus with hyperglycemia, without long-term current use of insulin ([facility] acute distress, alert and oriented x3   Heent: NC AT,    Pulm: Lungs clear, normal respiratory effort  CV: Heart with regular rate and rhythm,   Abd: Abdomen soft, nontender, nondistended   MSK:foot is wrapped, dressing in place  Skin: no rashes or lesions

## 2021-05-18 NOTE — PLAN OF CARE
Patient alert and oriented. VSS. Pain controlled with Morphine. Pain described as intermittent- pt will have random moments where pain gets very intense despite having pain medications- refuses anymore medication as intense pain subsides after few minutes. activity and pre-medicate as appropriate  Outcome: Progressing     Problem: RISK FOR INFECTION - ADULT  Goal: Absence of fever/infection during anticipated neutropenic period  Description: INTERVENTIONS  - Monitor WBC  - Administer growth factors as ordere Goal: Discharge home    Interventions:  - Monitor and assess vitals  - Monitor incision  - Administer medications as prescribed  - See additional Care Plan goals for specific interventions  Outcome: Progressing  Goal: Patient/Family Short Term Goal  Roxannri

## 2021-05-18 NOTE — PROGRESS NOTES
[facility] AND [facility] Infectious Disease Progress Note    Rajinderne Yancy Patient Status:  Inpatient    1970 MRN [id number]   Location [facility] 4W/SW/SE Attending Vincent Kearney MD   Hosp Day # 1 PCP None Pcp     Subjective:  Pt with no new c 11\" (1.803 m), weight 184 lb 8 oz (83.7 kg), SpO2 98 %. Temp (24hrs), Av °F (36.7 °C), Min:97.4 °F (36.3 °C), Max:98.5 °F (36.9 °C)      HEENT: Exam is unremarkable. Without scleral icterus. Mucous membranes are moist. PERRLA.   Oropharynx is clear

## 2021-05-18 NOTE — CM/SW NOTE
CM self-referred for dc planning and re-admission. CM met with pt and life partner Simeon Castellanos at bedside. Pt states that he lives in a garden apartment with Simeon Castellanos. There are 3 steps to enter.  Pt is independent with self care with the use of a walker and a knee

## 2021-05-19 PROBLEM — B95.8 STAPH INFECTION: Status: ACTIVE | Noted: 2021-05-19

## 2021-05-19 PROCEDURE — 99024 POSTOP FOLLOW-UP VISIT: CPT | Performed by: PODIATRIST

## 2021-05-19 RX ORDER — TRAMADOL HYDROCHLORIDE 50 MG/1
50 TABLET ORAL EVERY 6 HOURS PRN
Status: DISCONTINUED | OUTPATIENT
Start: 2021-05-19 | End: 2021-05-20

## 2021-05-19 RX ORDER — HYDROCODONE BITARTRATE AND ACETAMINOPHEN 10; 325 MG/1; MG/1
1 TABLET ORAL EVERY 4 HOURS PRN
Status: DISCONTINUED | OUTPATIENT
Start: 2021-05-19 | End: 2021-05-21

## 2021-05-19 NOTE — CM/SW NOTE
05/19/21 1200   CM/SW Referral Data   Referral Source    Reason for Referral Readmission     CM self-referred for dc planning and readmission. CM met with pt and sig other at bedside. Verified address and telephone number.  Pt lives in a [place]

## 2021-05-19 NOTE — PHYSICAL THERAPY NOTE
PHYSICAL THERAPY EVALUATION - INPATIENT     Room Number: 467/467-A  Evaluation Date: 5/19/2021  Type of Evaluation: Initial   Physician Order: PT Eval and Treat    Presenting Problem: s/p R partial 1st ray amputation on 5/18/21; (recent R hallux amputatio supports that patients with this level of impairment may benefit from home with no services. Pt has no further questions or concerns. Pt has been able to maintain NWB on RLE since he went home after 4/30/21 sx/admission.  Pt's significant other (RN) is a 3  Railing: Yes  Stairs to Bedroom: 0       Lives With:  (significant other Lola; 9 y/o son)  Drives: No  Patient Owned Equipment: Crutches;Rolling walker (knee scooter; *Needs RW at d/c)       Prior Level of [place]: Pt's significant other (RN) is Standardized Score (AM-PAC Scale): 47.67   CMS Modifier (G-Code): CJ    FUNCTIONAL ABILITY STATUS  Gait Assessment   Gait Assistance:  (SBA)  Distance (ft): 15ft x 3  Assistive Device: Rolling walker;Crutches  Pattern:  (NWB RLE)  Stoop/Curb Assistance:

## 2021-05-19 NOTE — PROGRESS NOTES
[facility] AND [facility] Infectious Disease  Progress Note    Tommas Earthly Patient Status:  Inpatient    1970 MRN [id number]   Location [facility] 4W/SW/SE Attending Delphine Marlow MD   Hosp Day # 2 PCP None Pcp     Subjective:  Patient seen/ex further amputation 5/18/21  - OR cultures with [facility] thus far     2. Recent admission for complicated R foot cellulitis after trauma to R great toe with drainage  - XR with nondisplaced fracture of the proximal phalanx  - MRI without OM  - Cultures with e. c

## 2021-05-19 NOTE — PLAN OF CARE
Plan of care reviewed with Kiley Ceballos. Patient c/o severe pain after dressing removed by podiatry. IV Morphine and Norco administered for pain management. IV antibiotics administered.  PICC line rescheduled for tomorrow due to patient feeling very nauseated whe

## 2021-05-19 NOTE — PLAN OF CARE
Patient alert and oriented. VSS.  Pain controlled with Morphine/Norco. Pain described as intermittent- pt will have random moments where pain gets very intense despite having pain medications- refuses anymore medication as intense pain subsides after few mi if interventions unsuccessful or patient reports new pain  - Anticipate increased pain with activity and pre-medicate as appropriate  Outcome: Progressing     Problem: RISK FOR INFECTION - ADULT  Goal: Absence of fever/infection during anticipated neutrope Patient/Family Goals  Goal: Patient/Family Long Term Goal  Description: Patient's Long Term Goal: Discharge home    Interventions:  - Monitor and assess vitals  - Monitor incision  - Administer medications as prescribed  - See additional Care Plan goals fo

## 2021-05-19 NOTE — PROGRESS NOTES
[facility] - [facility]    PODIATRY PROGRESS NOTE    Karime Ash Patient Status:  Inpatient    1970 MRN [id number]   Location [facility] 4W/SW/SE Attending Imtiaz Brown MD   [facility] Day # 2 PCP None Pcp     Date of Admission:  2021 PRN  •  Heparin Sodium (Porcine) 5000 UNIT/ML injection 5,000 Units, 5,000 Units, Subcutaneous, Q8H [address]  •  acetaminophen (TYLENOL) tab 650 mg, 650 mg, Oral, Q4H PRN **OR** HYDROcodone-acetaminophen (NORCO) 5-325 MG per tab 1 tablet, 1 tablet, Oral, Q4H [place] (CST): Sonja Paez MD on 5/17/2021 at 7:02 AM     Finalized by (CST):  Sonja Paez MD on 5/17/2021 at 7:04 AM             Laboratory Data:  Recent Labs   Lab 05/19/21  0709   RBC 4.46   HGB 12.7*   HCT 39.0   MCV 87.4   MCH 28.5   MCHC 32.6   RD

## 2021-05-19 NOTE — OCCUPATIONAL THERAPY NOTE
2 attempts made to see pt for OT evaluation. Initial attempt pt experiencing uncontrolled pain, 2nd attempt pt asleep. WiIl continue attempts.

## 2021-05-19 NOTE — CM/SW NOTE
RE: IV Abx    Patient will dc on Invanz 1gm IV daily. Due to Medicaid Pending status, patient will have to go to [facility]. SW notified Pawel Livingston of [facility] [address]. Per Criss Rodney, script needs to be entered in epic.      1228pm  Order entered via E

## 2021-05-19 NOTE — PROGRESS NOTES
[facility] Hospitalist Progress Note     CC: Hospital Follow up    PCP: None Pcp       Assessment/Plan:     Principal Problem:    Dry gangrene (HCC)  Active Problems:    Type 2 diabetes mellitus with hyperglycemia, without long-term current use of insulin ([facility] 1440 : 184 lb 8 oz (83.7 kg)  05/17/21 0551 : 183 lb (83 kg)  04/23/21 2049 : 182 lb (82.6 kg)      Exam    Gen: No acute distress, alert and oriented x3   Heent: NC AT,    Pulm: Lungs clear, normal respiratory effort  CV: Heart with regular rate and rhyth Subcutaneous TID CC   • piperacillin-tazobactam  3.375 g Intravenous Q8H       HYDROcodone-acetaminophen, traMADol HCl, PEG 3350, glucose **OR** Glucose-Vitamin C **OR** dextrose **OR** glucose **OR** Glucose-Vitamin C, acetaminophen **OR** [DISCONTINUED]

## 2021-05-20 ENCOUNTER — TELEPHONE (OUTPATIENT)
Dept: PODIATRY CLINIC | Facility: CLINIC | Age: 51
End: 2021-05-20

## 2021-05-20 ENCOUNTER — APPOINTMENT (OUTPATIENT)
Dept: PICC SERVICES | Facility: HOSPITAL | Age: 51
DRG: 240 | End: 2021-05-20
Attending: INTERNAL MEDICINE

## 2021-05-20 PROCEDURE — 02HV33Z INSERTION OF INFUSION DEVICE INTO SUPERIOR VENA CAVA, PERCUTANEOUS APPROACH: ICD-10-PCS | Performed by: HOSPITALIST

## 2021-05-20 RX ORDER — TRAMADOL HYDROCHLORIDE 50 MG/1
50 TABLET ORAL EVERY 4 HOURS PRN
Status: DISCONTINUED | OUTPATIENT
Start: 2021-05-20 | End: 2021-05-21

## 2021-05-20 RX ORDER — LIDOCAINE HYDROCHLORIDE 10 MG/ML
5 INJECTION, SOLUTION EPIDURAL; INFILTRATION; INTRACAUDAL; PERINEURAL ONCE
Status: COMPLETED | OUTPATIENT
Start: 2021-05-20 | End: 2021-05-20

## 2021-05-20 RX ORDER — ERTAPENEM 1 G/1
1 INJECTION, POWDER, LYOPHILIZED, FOR SOLUTION INTRAMUSCULAR; INTRAVENOUS DAILY
Qty: 17 EACH | Refills: 0 | Status: SHIPPED | OUTPATIENT
Start: 2021-05-22 | End: 2021-06-08

## 2021-05-20 NOTE — PLAN OF CARE
Pt is alert and oriented x4, vitals stable on room air. Complaining of moderate right foot pain, controlled with PRN norco, tramadol, and morphine. Goal is to transition off morphine in hopes of going home tomorrow. Voiding freely. Scheduled zosyn given.  Derrick Calero Absence of fever/infection during anticipated neutropenic period  Description: INTERVENTIONS  - Monitor WBC  - Administer growth factors as ordered  - Implement neutropenic guidelines  Outcome: Progressing     Problem: SAFETY ADULT - FALL  Goal: Free from pain medications as prescribed  - Work with PT/OT  - See additional Care Plan goals for specific interventions  Outcome: Progressing  Goal: Patient/Family Short Term Goal  Description: Patient's Short Term Goal: Keep my pain below a 7/10    Interventions:

## 2021-05-20 NOTE — OCCUPATIONAL THERAPY NOTE
OCCUPATIONAL THERAPY EVALUATION - INPATIENT     Room Number: 467/467-A  Evaluation Date: 5/20/2021  Type of Evaluation: Initial       Physician Order: IP Consult to Occupational Therapy  Reason for Therapy: ADL/IADL Dysfunction and Discharge Planning Staph infection      Past Medical History  Past Medical History:   Diagnosis Date   • Amputation of great toe, right, traumatic ([facility] 04/23/2021   • Diabetes ([facility]    • Gangrene of toe of right foot ([facility]    • Heart attack [facility])        Past Surgical Histor

## 2021-05-20 NOTE — CM/SW NOTE
RE: IV Abx     Patient will dc on Invanz 1gm IV daily. Due to Medicaid Pending status, patient will have to go to [facility].  Order entered via Jeana Brown notified [facility].         Plan: IV abx treatment at [facility] pending me

## 2021-05-20 NOTE — PLAN OF CARE
Patient alert and oriented. VSS. Initially tried Morphine/[place] for pain- no relief. Then tried Tramadol and patient felt significantly better. Intense waves of pain subsided completely after being given Tramadol.  Patient minimal weight bearing on right he Problem: RISK FOR INFECTION - ADULT  Goal: Absence of fever/infection during anticipated neutropenic period  Description: INTERVENTIONS  - Monitor WBC  - Administer growth factors as ordered  - Implement neutropenic guidelines  Outcome: Progressing     P vitals  - Monitor incision  - Administer medications as prescribed  - See additional Care Plan goals for specific interventions  Outcome: Progressing  Goal: Patient/Family Short Term Goal  Description: Patient's Short Term Goal: Pain management    Interven

## 2021-05-20 NOTE — PROGRESS NOTES
[facility] AND [facility] Infectious Disease Progress Note    Aure Rodriguez Patient Status:  Inpatient    1970 MRN [id number]   Location [facility] 4W/SW/SE Attending Osiris Hernández MD   Hosp Day # 3 PCP None Pcp     Subjective:  Chart reviewed, [facility] injection 4 mg, 4 mg, Intravenous, Q6H PRN  •  Prochlorperazine Edisylate (COMPAZINE) injection 5 mg, 5 mg, Intravenous, Q8H PRN  •  Insulin Aspart Pen (NOVOLOG) 100 UNIT/ML flexpen 1-7 Units, 1-7 Units, Subcutaneous, TID CC  •  Piperacillin Sod-T OM    2. DM II  -needs tight glycemic control for optimal healing    3. PAD  -arterial dopplers with tibial peroneal occlusive disease  -s/p angio on previous admission    DISPO:   PICC in place.   Will follow up final cultures, but will leave rx in chart f

## 2021-05-20 NOTE — PROGRESS NOTES
[facility]    PODIATRY PROGRESS NOTE    Robert Blank Patient Status:  Inpatient    1970 MRN [id number]   Location [facility] 4W/SW/SE Attending Bc Garvey MD   [facility] Day # 3 PCP None Pcp     Date of Admission:  2021 Q15 Min PRN **OR** glucose (DEX4) oral liquid 30 g, 30 g, Oral, Q15 Min PRN **OR** Glucose-Vitamin C (DEX-4) chewable tab 8 tablet, 8 tablet, Oral, Q15 Min PRN  •  Heparin Sodium (Porcine) 5000 UNIT/ML injection 5,000 Units, 5,000 Units, Subcutaneous, Q8H partial 1st ray amputation  2. PAD Right LE  3. Leukocytosis  4.  DM2        Arterial doppler did demonstrate mild to moderate PAD right LE with tib peroneal occlusive disease, Pt is s/p Right LE angioplasty.        Pt did see vascular Dr. Melissa Babb on 5/13/

## 2021-05-20 NOTE — PROGRESS NOTES
[facility] Hospitalist Progress Note     CC: Hospital Follow up    PCP: None Pcp       Assessment/Plan:     Principal Problem:    Dry gangrene (HCC)  Active Problems:    Type 2 diabetes mellitus with hyperglycemia, without long-term current use of insulin ([facility] 5/20/2021 1246  Last data filed at 5/20/2021 1230  Gross per 24 hour   Intake 480 ml   Output 1450 ml   Net -970 ml       Last 3 Weights  05/17/21 1440 : 184 lb 8 oz (83.7 kg)  05/17/21 0551 : 183 lb (83 kg)  04/23/21 2049 : 182 lb (82.6 kg)      Exam    G Subcutaneous TID CC   • piperacillin-tazobactam  3.375 g Intravenous Q8H       traMADol HCl, HYDROcodone-acetaminophen, PEG 3350, glucose **OR** Glucose-Vitamin C **OR** dextrose **OR** glucose **OR** Glucose-Vitamin C, acetaminophen **OR** [DISCONTINUED]

## 2021-05-20 NOTE — TELEPHONE ENCOUNTER
Please call patient and schedule appt for next week. Pt will be discharged from hospital tomorrow and did have right foot amputation.

## 2021-05-21 ENCOUNTER — APPOINTMENT (OUTPATIENT)
Dept: HEMATOLOGY/ONCOLOGY | Facility: HOSPITAL | Age: 51
End: 2021-05-21
Attending: PODIATRIST

## 2021-05-21 VITALS
HEIGHT: 71 IN | SYSTOLIC BLOOD PRESSURE: 105 MMHG | BODY MASS INDEX: 25.83 KG/M2 | RESPIRATION RATE: 18 BRPM | HEART RATE: 80 BPM | OXYGEN SATURATION: 99 % | DIASTOLIC BLOOD PRESSURE: 71 MMHG | WEIGHT: 184.5 LBS | TEMPERATURE: 99 F

## 2021-05-21 RX ORDER — SENNA PLUS 8.6 MG/1
8.6 TABLET ORAL NIGHTLY
Qty: 10 TABLET | Refills: 0 | Status: SHIPPED | OUTPATIENT
Start: 2021-05-21

## 2021-05-21 RX ORDER — AMOXICILLIN 875 MG/1
875 TABLET, COATED ORAL 2 TIMES DAILY
Qty: 42 TABLET | Refills: 0 | Status: SHIPPED | OUTPATIENT
Start: 2021-05-21 | End: 2021-06-10 | Stop reason: ALTCHOICE

## 2021-05-21 RX ORDER — TRAMADOL HYDROCHLORIDE 50 MG/1
50 TABLET ORAL EVERY 6 HOURS PRN
Qty: 15 TABLET | Refills: 0 | Status: SHIPPED | OUTPATIENT
Start: 2021-05-21 | End: 2021-06-30

## 2021-05-21 RX ORDER — GLIPIZIDE 10 MG/1
10 TABLET ORAL
Qty: 60 TABLET | Refills: 0 | Status: SHIPPED | OUTPATIENT
Start: 2021-05-21 | End: 2021-06-20

## 2021-05-21 NOTE — PLAN OF CARE
Patient stable, vital signs stable. Patient status post right toe ampution; surgical dressing place, no drainage- clean, dry and intact. Patient using partial weight bearing to right heel with surgical shoe to ambulate.  Patient pain controlled at this time fever/infection during anticipated neutropenic period  Description: INTERVENTIONS  - Monitor WBC  - Administer growth factors as ordered  - Implement neutropenic guidelines  Outcome: Progressing     Problem: SAFETY ADULT - FALL  Goal: Free from fall injury medications as prescribed  - Work with PT/OT  - See additional Care Plan goals for specific interventions  Outcome: Progressing  Goal: Patient/Family Short Term Goal  Description: Patient's Short Term Goal: Keep my pain below a 7/10    Interventions:    - M

## 2021-05-21 NOTE — PLAN OF CARE
Left PICC line in place. Plan for Invanz x3 weeks. Voiding. PRN tramadol. Surgical dressing maintained.  Anticipated discharge in AM.              Problem: Patient Centered Care  Goal: Patient preferences are identified and integrated in the patient's plan cognitive and physical deficits and behaviors that affect risk of falls.   - [address] fall precautions as indicated by assessment.  - Educate pt/family on patient safety including physical limitations  - Instruct pt to call for assistance with activity bas tramadol and norco  - See additional Care Plan goals for specific interventions  Outcome: Progressing     Problem: Peripheral vascular status not within defined limits  Goal: Pt will have peripheral vascular status adequate for disch  Outcome: Progressing

## 2021-05-21 NOTE — PROGRESS NOTES
Discharge instructions given to patient, patient verbalized understanding ,see AVS. Patient given walker by physical therapy. Patient PICC flushed after invanz infusion.  Patient transported via wheelchair- ID added amoxicillin to patient's discharge medica

## 2021-05-21 NOTE — PROGRESS NOTES
[facility] AND [facility] Infectious Disease Progress Note    Nathaniel Lamar Patient Status:  Inpatient    1970 MRN [id number]   Location [facility] 4W/SW/SE Attending Monico Gonzalez MD   Hosp Day # 4 PCP None Pcp     Subjective:  Pt with no new c injection 5 mg, 5 mg, Intravenous, Q8H PRN  •  Insulin Aspart Pen (NOVOLOG) 100 UNIT/ML flexpen 1-7 Units, 1-7 Units, Subcutaneous, TID CC  •  Piperacillin Sod-Tazobactam So (ZOSYN) 3.375 g in dextrose 5% 100 mL IVPB-ADDV, 3.375 g, Intravenous, Q8H    Phys therapy    If you have any questions or concerns please call [facility] at [phone].      Marianne Lopez NP  5/18/2021  10:33 AM

## 2021-05-21 NOTE — DISCHARGE SUMMARY
General Medicine Discharge Summary     Patient ID:  Izabel Lucero  49year old  2/24/1970    Admit date: 5/17/2021    Discharge date and time: 5/21/2021    Attending Physician: Joaquin Summers MD     Consults: IP CONSULT TO PODIATRY  IP CONSULT TO INFECTIOUS Ubaldo is a 50 yo M with PMH of recently diagnosed DM2, R toe gangrene s/p angioplasty and R hallux amputation 4/30/21 who now presents with worsening pain, noted to have right toe gangrene, podiatry and ID consulted, status post partial first ray amputa total) by mouth nightly. traMADol HCl 50 MG Tabs  Commonly known as: ULTRAM  Take 1 tablet (50 mg total) by mouth every 6 (six) hours as needed.         CHANGE how you take these medications    Clopidogrel Bisulfate 75 MG Tabs  Commonly known as: PLAVIX Instructions:        You have been started on glipizide for your Diabetes in addition to metformin  Please check your blood sugars regularly  Please follow up with new PCP or free clinic for monitoring of your DM    Please follow up with podiatry, Infectiou

## 2021-05-21 NOTE — CM/SW NOTE
SW called and LVM for [facility] infusion center to notify that patient is going to dc today. Plan: IV abx at [address] [place] [address], apt confirmed for 8:45am, patient notified at bedside.          REGLA Crook, [facility]

## 2021-05-22 ENCOUNTER — HOSPITAL ENCOUNTER (EMERGENCY)
Facility: HOSPITAL | Age: 51
Discharge: HOME | End: 2021-05-22
Attending: EMERGENCY MEDICINE

## 2021-05-22 ENCOUNTER — NURSE ONLY (OUTPATIENT)
Dept: HEMATOLOGY/ONCOLOGY | Facility: HOSPITAL | Age: 51
End: 2021-05-22
Attending: NURSE PRACTITIONER

## 2021-05-22 VITALS
RESPIRATION RATE: 18 BRPM | HEART RATE: 92 BPM | SYSTOLIC BLOOD PRESSURE: 109 MMHG | HEIGHT: 71 IN | OXYGEN SATURATION: 98 % | WEIGHT: 183 LBS | TEMPERATURE: 99 F | BODY MASS INDEX: 25.62 KG/M2 | DIASTOLIC BLOOD PRESSURE: 70 MMHG

## 2021-05-22 VITALS
SYSTOLIC BLOOD PRESSURE: 142 MMHG | HEART RATE: 102 BPM | TEMPERATURE: 98 F | RESPIRATION RATE: 18 BRPM | OXYGEN SATURATION: 100 % | DIASTOLIC BLOOD PRESSURE: 68 MMHG

## 2021-05-22 DIAGNOSIS — B95.8 STAPH INFECTION: Primary | ICD-10-CM

## 2021-05-22 DIAGNOSIS — Z48.89 ENCOUNTER FOR POST SURGICAL WOUND CHECK: Primary | ICD-10-CM

## 2021-05-22 DIAGNOSIS — L03.115 CELLULITIS OF RIGHT LOWER EXTREMITY: ICD-10-CM

## 2021-05-22 PROCEDURE — 96365 THER/PROPH/DIAG IV INF INIT: CPT

## 2021-05-22 PROCEDURE — 99281 EMR DPT VST MAYX REQ PHY/QHP: CPT

## 2021-05-22 RX ORDER — ERTAPENEM 1 G/1
INJECTION, POWDER, LYOPHILIZED, FOR SOLUTION INTRAMUSCULAR; INTRAVENOUS
Status: DISCONTINUED
Start: 2021-05-22 | End: 2021-05-22

## 2021-05-22 RX ORDER — 0.9 % SODIUM CHLORIDE 0.9 %
INTRAVENOUS SOLUTION INTRAVENOUS
Status: DISCONTINUED
Start: 2021-05-22 | End: 2021-05-22

## 2021-05-22 NOTE — ED QUICK NOTES
rec'd care of pt who is a couple days post op from right great toe amputation. States he was bleeding through his dressing so he came to er for wound check. No active bleeding noticed. No s/s of infection noted.  Positioned on cart for comfort, family at be

## 2021-05-22 NOTE — ED PROVIDER NOTES
Patient Seen in: [facility] AND [facility] Emergency Department      History   Patient presents with:  Wound Recheck    Stated Complaint: hx of big toe amputation, bleeding    HPI/Subjective:   HPI    52-year-old male with past medical history significant for CA 3-5x/week Resp 18   Ht 180.3 cm (5' 11\")   Wt 83 kg   SpO2 98%   BMI 25.52 kg/m²         Physical Exam    Physical Exam   Constitutional: AAOx3, well nourished, NAD  Head: Normocephalic and atraumatic.    Ears: TM's clear b/l  Nose: Nose normal.   Mouth/Throat: MMM,

## 2021-05-22 NOTE — PROGRESS NOTES
Dave Reinoso to infusion today for daily Invanz to treat right foot gangrenous diabetic wound. He had repeat surgery/amputation of right great toe. He arrives alert and ambulating with crutches. Surgical shoe to right foot.  He denies pain and states he tries to

## 2021-05-22 NOTE — ED INITIAL ASSESSMENT (HPI)
PATIENT TO ED VIA PRIVATE VEHICLE PATIENT RECENTLY HAD RIGHT BIG TOE AMPUTATED CALLED DOCTOR AND WAS TOLD TO COME TO ED TO HAVE DRESSING CHANGED/WOUND ASSESSED. PATIENT HAS PICC LINE FOR ABX INFUSION.

## 2021-05-23 ENCOUNTER — NURSE ONLY (OUTPATIENT)
Dept: HEMATOLOGY/ONCOLOGY | Facility: HOSPITAL | Age: 51
End: 2021-05-23
Attending: INTERNAL MEDICINE

## 2021-05-23 VITALS
DIASTOLIC BLOOD PRESSURE: 76 MMHG | TEMPERATURE: 98 F | HEART RATE: 98 BPM | OXYGEN SATURATION: 100 % | RESPIRATION RATE: 18 BRPM | SYSTOLIC BLOOD PRESSURE: 121 MMHG

## 2021-05-23 DIAGNOSIS — B95.8 STAPH INFECTION: Primary | ICD-10-CM

## 2021-05-23 DIAGNOSIS — L03.115 CELLULITIS OF RIGHT LOWER EXTREMITY: ICD-10-CM

## 2021-05-23 PROCEDURE — 96365 THER/PROPH/DIAG IV INF INIT: CPT

## 2021-05-23 RX ORDER — ERTAPENEM 1 G/1
INJECTION, POWDER, LYOPHILIZED, FOR SOLUTION INTRAMUSCULAR; INTRAVENOUS
Status: DISPENSED
Start: 2021-05-23 | End: 2021-05-23

## 2021-05-23 RX ORDER — 0.9 % SODIUM CHLORIDE 0.9 %
INTRAVENOUS SOLUTION INTRAVENOUS
Status: DISPENSED
Start: 2021-05-23 | End: 2021-05-23

## 2021-05-23 NOTE — PROGRESS NOTES
Nick Joyner to infusion today for daily Invanz to treat right foot gangrenous diabetic wound. He had repeat surgery/amputation of right great toe. He arrives alert and ambulating with crutches. Surgical shoe to right foot.    He continues to report that he has n

## 2021-05-24 ENCOUNTER — NURSE ONLY (OUTPATIENT)
Dept: HEMATOLOGY/ONCOLOGY | Facility: HOSPITAL | Age: 51
End: 2021-05-24
Attending: INTERNAL MEDICINE

## 2021-05-24 VITALS
TEMPERATURE: 98 F | SYSTOLIC BLOOD PRESSURE: 129 MMHG | DIASTOLIC BLOOD PRESSURE: 73 MMHG | OXYGEN SATURATION: 99 % | HEART RATE: 99 BPM | RESPIRATION RATE: 18 BRPM

## 2021-05-24 DIAGNOSIS — L03.115 CELLULITIS OF RIGHT LOWER EXTREMITY: ICD-10-CM

## 2021-05-24 DIAGNOSIS — B95.8 STAPH INFECTION: Primary | ICD-10-CM

## 2021-05-24 PROCEDURE — 85025 COMPLETE CBC W/AUTO DIFF WBC: CPT

## 2021-05-24 PROCEDURE — 86140 C-REACTIVE PROTEIN: CPT

## 2021-05-24 PROCEDURE — 80053 COMPREHEN METABOLIC PANEL: CPT

## 2021-05-24 PROCEDURE — 96365 THER/PROPH/DIAG IV INF INIT: CPT

## 2021-05-24 RX ORDER — ERTAPENEM 1 G/1
INJECTION, POWDER, LYOPHILIZED, FOR SOLUTION INTRAMUSCULAR; INTRAVENOUS
Status: DISCONTINUED
Start: 2021-05-24 | End: 2021-05-24

## 2021-05-24 RX ORDER — 0.9 % SODIUM CHLORIDE 0.9 %
INTRAVENOUS SOLUTION INTRAVENOUS
Status: DISCONTINUED
Start: 2021-05-24 | End: 2021-05-24

## 2021-05-24 NOTE — PROGRESS NOTES
Helen [place] to infusion today for daily Invanz to treat right foot gangrenous diabetic wound. He had repeat surgery/amputation of right great toe. He arrives alert and ambulating with crutches. Surgical shoe to right foot. Dressing dry and intact to right foot.

## 2021-05-25 ENCOUNTER — NURSE ONLY (OUTPATIENT)
Dept: HEMATOLOGY/ONCOLOGY | Facility: HOSPITAL | Age: 51
End: 2021-05-25
Attending: INTERNAL MEDICINE

## 2021-05-25 ENCOUNTER — TELEPHONE (OUTPATIENT)
Dept: PODIATRY CLINIC | Facility: CLINIC | Age: 51
End: 2021-05-25

## 2021-05-25 VITALS
OXYGEN SATURATION: 100 % | SYSTOLIC BLOOD PRESSURE: 140 MMHG | TEMPERATURE: 98 F | DIASTOLIC BLOOD PRESSURE: 69 MMHG | HEART RATE: 106 BPM | RESPIRATION RATE: 16 BRPM

## 2021-05-25 DIAGNOSIS — L03.115 CELLULITIS OF RIGHT LOWER EXTREMITY: ICD-10-CM

## 2021-05-25 DIAGNOSIS — B95.8 STAPH INFECTION: Primary | ICD-10-CM

## 2021-05-25 PROCEDURE — 96365 THER/PROPH/DIAG IV INF INIT: CPT

## 2021-05-25 RX ORDER — 0.9 % SODIUM CHLORIDE 0.9 %
INTRAVENOUS SOLUTION INTRAVENOUS
Status: DISCONTINUED
Start: 2021-05-25 | End: 2021-05-25

## 2021-05-25 RX ORDER — ERTAPENEM 1 G/1
INJECTION, POWDER, LYOPHILIZED, FOR SOLUTION INTRAMUSCULAR; INTRAVENOUS
Status: DISCONTINUED
Start: 2021-05-25 | End: 2021-05-25

## 2021-05-25 NOTE — PROGRESS NOTES
Padma Zamora to infusion today for daily Invanz to treat right foot gangrenous diabetic wound. . Surgical shoe to right foot. Using rolling scooter. Tolerating abx with no side effects reported. Discharged stable.     EOT: 6/10/21  F/U podiatry Dr. Jessy Rivera: 5/

## 2021-05-25 NOTE — TELEPHONE ENCOUNTER
New note made. Called pt and notified him that note is in [employer] but he states he doesn't use mychart and would like to p/u at [facility] office today. Advised him he could come between 1-530pm today.

## 2021-05-25 NOTE — TELEPHONE ENCOUNTER
S/w pt and he clarified that he needs the note to say ok for him to be off work since 4/23/21 when he was first admitted to [facility] until further notice. Please confirm you are ok with this?

## 2021-05-25 NOTE — TELEPHONE ENCOUNTER
Pt states he needs a letter for [facility] [employer] stating that he needs to be off of work until further notice - asking to  note today

## 2021-05-26 ENCOUNTER — NURSE ONLY (OUTPATIENT)
Dept: HEMATOLOGY/ONCOLOGY | Facility: HOSPITAL | Age: 51
End: 2021-05-26
Attending: INTERNAL MEDICINE

## 2021-05-26 VITALS
TEMPERATURE: 98 F | RESPIRATION RATE: 16 BRPM | DIASTOLIC BLOOD PRESSURE: 58 MMHG | SYSTOLIC BLOOD PRESSURE: 108 MMHG | HEART RATE: 90 BPM | OXYGEN SATURATION: 100 %

## 2021-05-26 DIAGNOSIS — B95.8 STAPH INFECTION: Primary | ICD-10-CM

## 2021-05-26 DIAGNOSIS — L03.115 CELLULITIS OF RIGHT LOWER EXTREMITY: ICD-10-CM

## 2021-05-26 PROCEDURE — 96365 THER/PROPH/DIAG IV INF INIT: CPT

## 2021-05-26 RX ORDER — ERTAPENEM 1 G/1
INJECTION, POWDER, LYOPHILIZED, FOR SOLUTION INTRAMUSCULAR; INTRAVENOUS
Status: DISCONTINUED
Start: 2021-05-26 | End: 2021-05-26

## 2021-05-26 RX ORDER — 0.9 % SODIUM CHLORIDE 0.9 %
INTRAVENOUS SOLUTION INTRAVENOUS
Status: DISCONTINUED
Start: 2021-05-26 | End: 2021-05-26

## 2021-05-26 NOTE — PROGRESS NOTES
Pt to infusion for daily Invanz to treat right foot gangrenous diabetic wound. Surgical shoe to right foot; Using rolling scooter. Pt reports heartburn, taking Tums/Pepcid. Denies diarrhea. Infusion tolerated well.  Discharged with future appointments.

## 2021-05-27 ENCOUNTER — NURSE ONLY (OUTPATIENT)
Dept: HEMATOLOGY/ONCOLOGY | Facility: HOSPITAL | Age: 51
End: 2021-05-27
Attending: INTERNAL MEDICINE

## 2021-05-27 VITALS
DIASTOLIC BLOOD PRESSURE: 71 MMHG | OXYGEN SATURATION: 100 % | RESPIRATION RATE: 16 BRPM | SYSTOLIC BLOOD PRESSURE: 103 MMHG | TEMPERATURE: 98 F | HEART RATE: 104 BPM

## 2021-05-27 DIAGNOSIS — B95.8 STAPH INFECTION: Primary | ICD-10-CM

## 2021-05-27 DIAGNOSIS — L03.115 CELLULITIS OF RIGHT LOWER EXTREMITY: ICD-10-CM

## 2021-05-27 PROCEDURE — 96365 THER/PROPH/DIAG IV INF INIT: CPT

## 2021-05-27 RX ORDER — 0.9 % SODIUM CHLORIDE 0.9 %
INTRAVENOUS SOLUTION INTRAVENOUS
Status: DISCONTINUED
Start: 2021-05-27 | End: 2021-05-27

## 2021-05-27 RX ORDER — ERTAPENEM 1 G/1
INJECTION, POWDER, LYOPHILIZED, FOR SOLUTION INTRAMUSCULAR; INTRAVENOUS
Status: DISCONTINUED
Start: 2021-05-27 | End: 2021-05-27

## 2021-05-27 NOTE — PROGRESS NOTES
Pt to infusion for daily Invanz to treat right foot gangrenous diabetic wound. Surgical shoe to right foot; Using rolling scooter. Pt reports heartburn, taking Tums/Pepcid. Denies diarrhea. States he is feeling \"better\" every day.   Infusion tolerated we

## 2021-05-28 ENCOUNTER — NURSE ONLY (OUTPATIENT)
Dept: HEMATOLOGY/ONCOLOGY | Facility: HOSPITAL | Age: 51
End: 2021-05-28
Attending: INTERNAL MEDICINE

## 2021-05-28 ENCOUNTER — OFFICE VISIT (OUTPATIENT)
Dept: PODIATRY CLINIC | Facility: CLINIC | Age: 51
End: 2021-05-28
Payer: OTHER MISCELLANEOUS

## 2021-05-28 VITALS
HEART RATE: 93 BPM | RESPIRATION RATE: 16 BRPM | TEMPERATURE: 97 F | DIASTOLIC BLOOD PRESSURE: 65 MMHG | OXYGEN SATURATION: 100 % | SYSTOLIC BLOOD PRESSURE: 120 MMHG

## 2021-05-28 DIAGNOSIS — Z98.890 PERIPHERAL ARTERIAL DISEASE WITH HISTORY OF REVASCULARIZATION (HCC): ICD-10-CM

## 2021-05-28 DIAGNOSIS — Z98.890 POST-OPERATIVE STATE: Primary | ICD-10-CM

## 2021-05-28 DIAGNOSIS — L03.115 CELLULITIS OF RIGHT LOWER EXTREMITY: ICD-10-CM

## 2021-05-28 DIAGNOSIS — Z89.419 HISTORY OF AMPUTATION OF GREAT TOE (HCC): ICD-10-CM

## 2021-05-28 DIAGNOSIS — E11.65 UNCONTROLLED TYPE 2 DIABETES MELLITUS WITH HYPERGLYCEMIA (HCC): ICD-10-CM

## 2021-05-28 DIAGNOSIS — I73.9 PERIPHERAL ARTERIAL DISEASE WITH HISTORY OF REVASCULARIZATION (HCC): ICD-10-CM

## 2021-05-28 DIAGNOSIS — B95.8 STAPH INFECTION: Primary | ICD-10-CM

## 2021-05-28 PROCEDURE — 99024 POSTOP FOLLOW-UP VISIT: CPT | Performed by: PODIATRIST

## 2021-05-28 PROCEDURE — 96365 THER/PROPH/DIAG IV INF INIT: CPT

## 2021-05-28 RX ORDER — ERTAPENEM 1 G/1
INJECTION, POWDER, LYOPHILIZED, FOR SOLUTION INTRAMUSCULAR; INTRAVENOUS
Status: DISPENSED
Start: 2021-05-28 | End: 2021-05-29

## 2021-05-28 RX ORDER — 0.9 % SODIUM CHLORIDE 0.9 %
INTRAVENOUS SOLUTION INTRAVENOUS
Status: DISPENSED
Start: 2021-05-28 | End: 2021-05-29

## 2021-05-28 NOTE — PROGRESS NOTES
[facility] Podiatry  Progress Note    Narda Babinski is a 49year old male.  Patient presents with:  Post-Op: Right partial 1st ray amputation - 1st visit - had sx on 5/18/21 - states he feels kind of better - has little pain rated as 4-5/10 on and of Number of children: Not on file      Years of education: Not on file      Highest education level: Not on file    Tobacco Use      Smoking status: Former Smoker      Smokeless tobacco: Never Used    Vaping Use      Vaping Use: Never used    Substance and S with hyperglycemia ([address]        Plan:     Arterial doppler did demonstrate mild to moderate PAD right LE with tib peroneal occlusive disease, Pt is s/p Right LE angioplasty.        Pt did see vascular Dr. Nora Carpio on 5/13/21 who has cleared him and states

## 2021-05-29 ENCOUNTER — NURSE ONLY (OUTPATIENT)
Dept: HEMATOLOGY/ONCOLOGY | Facility: HOSPITAL | Age: 51
End: 2021-05-29
Attending: INTERNAL MEDICINE

## 2021-05-29 VITALS
TEMPERATURE: 98 F | HEART RATE: 87 BPM | OXYGEN SATURATION: 100 % | SYSTOLIC BLOOD PRESSURE: 125 MMHG | DIASTOLIC BLOOD PRESSURE: 73 MMHG | RESPIRATION RATE: 16 BRPM

## 2021-05-29 DIAGNOSIS — B95.8 STAPH INFECTION: Primary | ICD-10-CM

## 2021-05-29 DIAGNOSIS — L03.115 CELLULITIS OF RIGHT LOWER EXTREMITY: ICD-10-CM

## 2021-05-29 PROCEDURE — 96365 THER/PROPH/DIAG IV INF INIT: CPT

## 2021-05-29 RX ORDER — ERTAPENEM 1 G/1
INJECTION, POWDER, LYOPHILIZED, FOR SOLUTION INTRAMUSCULAR; INTRAVENOUS
Status: DISPENSED
Start: 2021-05-29 | End: 2021-05-29

## 2021-05-29 RX ORDER — 0.9 % SODIUM CHLORIDE 0.9 %
INTRAVENOUS SOLUTION INTRAVENOUS
Status: DISPENSED
Start: 2021-05-29 | End: 2021-05-29

## 2021-05-29 NOTE — PROGRESS NOTES
Pt to infusion for daily Invanz to treat right foot gangrenous diabetic wound. Surgical shoe to right pcidfoot; Using rolling scooter. Heartburn,resolved   Denies diarrhea. States he is feeling \"better\" every day. Infusion tolerated well.    PICC dressi

## 2021-05-30 ENCOUNTER — NURSE ONLY (OUTPATIENT)
Dept: HEMATOLOGY/ONCOLOGY | Facility: HOSPITAL | Age: 51
End: 2021-05-30
Attending: INTERNAL MEDICINE

## 2021-05-30 VITALS
SYSTOLIC BLOOD PRESSURE: 106 MMHG | RESPIRATION RATE: 16 BRPM | HEART RATE: 93 BPM | DIASTOLIC BLOOD PRESSURE: 66 MMHG | TEMPERATURE: 98 F | OXYGEN SATURATION: 99 %

## 2021-05-30 DIAGNOSIS — L03.115 CELLULITIS OF RIGHT LOWER EXTREMITY: ICD-10-CM

## 2021-05-30 DIAGNOSIS — B95.8 STAPH INFECTION: Primary | ICD-10-CM

## 2021-05-30 PROCEDURE — 96365 THER/PROPH/DIAG IV INF INIT: CPT

## 2021-05-30 PROCEDURE — 96374 THER/PROPH/DIAG INJ IV PUSH: CPT

## 2021-05-30 RX ORDER — ERTAPENEM 1 G/1
INJECTION, POWDER, LYOPHILIZED, FOR SOLUTION INTRAMUSCULAR; INTRAVENOUS
Status: DISPENSED
Start: 2021-05-30 | End: 2021-05-30

## 2021-05-30 NOTE — PROGRESS NOTES
Pt to infusion for daily Invanz to treat right foot gangrenous diabetic wound. Surgical shoe to right pcidfoot; Using rolling scooter. Heartburn,resolved   Denies diarrhea. States he is feeling \"better\" every day. Infusion tolerated well.      Discharge

## 2021-05-31 ENCOUNTER — NURSE ONLY (OUTPATIENT)
Dept: HEMATOLOGY/ONCOLOGY | Facility: HOSPITAL | Age: 51
End: 2021-05-31
Attending: INTERNAL MEDICINE

## 2021-05-31 VITALS
OXYGEN SATURATION: 99 % | HEART RATE: 90 BPM | RESPIRATION RATE: 16 BRPM | TEMPERATURE: 98 F | DIASTOLIC BLOOD PRESSURE: 60 MMHG | SYSTOLIC BLOOD PRESSURE: 101 MMHG

## 2021-05-31 DIAGNOSIS — L03.115 CELLULITIS OF RIGHT LOWER EXTREMITY: ICD-10-CM

## 2021-05-31 DIAGNOSIS — B95.8 STAPH INFECTION: Primary | ICD-10-CM

## 2021-05-31 PROCEDURE — 85025 COMPLETE CBC W/AUTO DIFF WBC: CPT

## 2021-05-31 PROCEDURE — 80053 COMPREHEN METABOLIC PANEL: CPT

## 2021-05-31 PROCEDURE — 86140 C-REACTIVE PROTEIN: CPT

## 2021-05-31 PROCEDURE — 96365 THER/PROPH/DIAG IV INF INIT: CPT

## 2021-05-31 RX ORDER — 0.9 % SODIUM CHLORIDE 0.9 %
INTRAVENOUS SOLUTION INTRAVENOUS
Status: DISCONTINUED
Start: 2021-05-31 | End: 2021-05-31

## 2021-05-31 RX ORDER — ERTAPENEM 1 G/1
INJECTION, POWDER, LYOPHILIZED, FOR SOLUTION INTRAMUSCULAR; INTRAVENOUS
Status: DISCONTINUED
Start: 2021-05-31 | End: 2021-05-31

## 2021-05-31 NOTE — PROGRESS NOTES
Pt to infusion for daily Invanz to treat right foot gangrenous diabetic wound. Surgical shoe to right foot; Using rolling scooter. No complaints today except fatigue. Labs drawn via picc line. Infusion tolerated well.      Discharged with future appointm

## 2021-06-02 ENCOUNTER — NURSE ONLY (OUTPATIENT)
Dept: HEMATOLOGY/ONCOLOGY | Facility: HOSPITAL | Age: 51
End: 2021-06-02
Attending: INTERNAL MEDICINE

## 2021-06-02 VITALS
HEART RATE: 100 BPM | OXYGEN SATURATION: 95 % | RESPIRATION RATE: 16 BRPM | DIASTOLIC BLOOD PRESSURE: 73 MMHG | SYSTOLIC BLOOD PRESSURE: 116 MMHG | TEMPERATURE: 98 F

## 2021-06-02 DIAGNOSIS — B95.8 STAPH INFECTION: Primary | ICD-10-CM

## 2021-06-02 DIAGNOSIS — L03.115 CELLULITIS OF RIGHT LOWER EXTREMITY: ICD-10-CM

## 2021-06-02 PROCEDURE — 96365 THER/PROPH/DIAG IV INF INIT: CPT

## 2021-06-02 RX ORDER — 0.9 % SODIUM CHLORIDE 0.9 %
INTRAVENOUS SOLUTION INTRAVENOUS
Status: DISCONTINUED
Start: 2021-06-02 | End: 2021-06-02

## 2021-06-02 RX ORDER — ERTAPENEM 1 G/1
INJECTION, POWDER, LYOPHILIZED, FOR SOLUTION INTRAMUSCULAR; INTRAVENOUS
Status: DISCONTINUED
Start: 2021-06-02 | End: 2021-06-02

## 2021-06-02 NOTE — PROGRESS NOTES
Pt to infusion for daily Invanz to treat right foot gangrenous diabetic wound. Surgical shoe to right foot; Using rolling scooter. No complaints today except fatigue. .   Infusion tolerated well. Discharged with future appointments scheduled.    Jaylyn

## 2021-06-03 ENCOUNTER — NURSE ONLY (OUTPATIENT)
Dept: HEMATOLOGY/ONCOLOGY | Facility: HOSPITAL | Age: 51
End: 2021-06-03
Attending: INTERNAL MEDICINE

## 2021-06-03 VITALS
SYSTOLIC BLOOD PRESSURE: 112 MMHG | HEART RATE: 103 BPM | OXYGEN SATURATION: 100 % | TEMPERATURE: 98 F | DIASTOLIC BLOOD PRESSURE: 70 MMHG | RESPIRATION RATE: 16 BRPM

## 2021-06-03 DIAGNOSIS — L03.115 CELLULITIS OF RIGHT LOWER EXTREMITY: ICD-10-CM

## 2021-06-03 DIAGNOSIS — B95.8 STAPH INFECTION: Primary | ICD-10-CM

## 2021-06-03 PROCEDURE — 96365 THER/PROPH/DIAG IV INF INIT: CPT

## 2021-06-03 RX ORDER — ERTAPENEM 1 G/1
INJECTION, POWDER, LYOPHILIZED, FOR SOLUTION INTRAMUSCULAR; INTRAVENOUS
Status: DISCONTINUED
Start: 2021-06-03 | End: 2021-06-03

## 2021-06-03 RX ORDER — 0.9 % SODIUM CHLORIDE 0.9 %
INTRAVENOUS SOLUTION INTRAVENOUS
Status: DISCONTINUED
Start: 2021-06-03 | End: 2021-06-03

## 2021-06-03 NOTE — PROGRESS NOTES
Pt to infusion for daily Invanz to treat right foot gangrenous diabetic wound. Surgical shoe to right foot; Using rolling scooter. No complaints today except fatigue. .   Infusion tolerated well. PICC dressing changed per protocol.    Discharged with futu

## 2021-06-04 ENCOUNTER — NURSE ONLY (OUTPATIENT)
Dept: HEMATOLOGY/ONCOLOGY | Facility: HOSPITAL | Age: 51
End: 2021-06-04
Attending: INTERNAL MEDICINE

## 2021-06-04 ENCOUNTER — TELEPHONE (OUTPATIENT)
Dept: HEMATOLOGY/ONCOLOGY | Facility: HOSPITAL | Age: 51
End: 2021-06-04

## 2021-06-04 ENCOUNTER — OFFICE VISIT (OUTPATIENT)
Dept: PODIATRY CLINIC | Facility: CLINIC | Age: 51
End: 2021-06-04
Payer: OTHER MISCELLANEOUS

## 2021-06-04 VITALS
DIASTOLIC BLOOD PRESSURE: 67 MMHG | OXYGEN SATURATION: 100 % | RESPIRATION RATE: 16 BRPM | HEART RATE: 91 BPM | TEMPERATURE: 98 F | SYSTOLIC BLOOD PRESSURE: 125 MMHG

## 2021-06-04 DIAGNOSIS — L97.512 DIABETIC ULCER OF OTHER PART OF RIGHT FOOT ASSOCIATED WITH DIABETES MELLITUS DUE TO UNDERLYING CONDITION, WITH FAT LAYER EXPOSED (HCC): ICD-10-CM

## 2021-06-04 DIAGNOSIS — Z98.890 PERIPHERAL ARTERIAL DISEASE WITH HISTORY OF REVASCULARIZATION (HCC): Primary | ICD-10-CM

## 2021-06-04 DIAGNOSIS — E08.621 DIABETIC ULCER OF OTHER PART OF RIGHT FOOT ASSOCIATED WITH DIABETES MELLITUS DUE TO UNDERLYING CONDITION, WITH FAT LAYER EXPOSED (HCC): ICD-10-CM

## 2021-06-04 DIAGNOSIS — L03.115 CELLULITIS OF RIGHT LOWER EXTREMITY: ICD-10-CM

## 2021-06-04 DIAGNOSIS — I99.8 ISCHEMIC PAIN OF FOOT, RIGHT: ICD-10-CM

## 2021-06-04 DIAGNOSIS — M79.671 ISCHEMIC PAIN OF FOOT, RIGHT: ICD-10-CM

## 2021-06-04 DIAGNOSIS — B95.8 STAPH INFECTION: Primary | ICD-10-CM

## 2021-06-04 DIAGNOSIS — E11.65 UNCONTROLLED TYPE 2 DIABETES MELLITUS WITH HYPERGLYCEMIA (HCC): ICD-10-CM

## 2021-06-04 DIAGNOSIS — Z89.419 HISTORY OF AMPUTATION OF GREAT TOE (HCC): ICD-10-CM

## 2021-06-04 DIAGNOSIS — I73.9 PERIPHERAL ARTERIAL DISEASE WITH HISTORY OF REVASCULARIZATION (HCC): Primary | ICD-10-CM

## 2021-06-04 PROCEDURE — 99213 OFFICE O/P EST LOW 20 MIN: CPT | Performed by: PODIATRIST

## 2021-06-04 PROCEDURE — 96365 THER/PROPH/DIAG IV INF INIT: CPT

## 2021-06-04 RX ORDER — 0.9 % SODIUM CHLORIDE 0.9 %
INTRAVENOUS SOLUTION INTRAVENOUS
Status: DISCONTINUED
Start: 2021-06-04 | End: 2021-06-04

## 2021-06-04 RX ORDER — TRAMADOL HYDROCHLORIDE 50 MG/1
50 TABLET ORAL NIGHTLY PRN
Qty: 30 TABLET | Refills: 0 | Status: SHIPPED | OUTPATIENT
Start: 2021-06-04 | End: 2021-07-04

## 2021-06-04 RX ORDER — ERTAPENEM 1 G/1
INJECTION, POWDER, LYOPHILIZED, FOR SOLUTION INTRAMUSCULAR; INTRAVENOUS
Status: DISCONTINUED
Start: 2021-06-04 | End: 2021-06-04

## 2021-06-04 NOTE — PROGRESS NOTES
Pt to infusion for daily Invanz to treat right foot gangrenous diabetic wound. Surgical shoe to right foot; Using rolling scooter. No complaints today except fatigue. .   Infusion tolerated well.   Discharged with future appointments.   Patient missed appt

## 2021-06-04 NOTE — TELEPHONE ENCOUNTER
Left message with Dr. Joseph Wolf, [facility] office to clarify Raul Dias stop date. Pt stated to infusion RN on 6/3 that provider instructed pt to skip 6/10 infusion and see provider that day. Will await call back on Monday.

## 2021-06-04 NOTE — PROGRESS NOTES
[facility] Podiatry  Progress Note    Petros Marinelli is a 49year old male.  Patient presents with:  Post-Op: s/p R foot f/u - had sx on 5/18/21 - states he was doing ok but for the past 2-3 days he has a throbing pain on and off rated as 6-7/10 - he No pertinent family history.    Social History    Socioeconomic History      Marital status: Single      Spouse name: Not on file      Number of children: Not on file      Years of education: Not on file      Highest education level: Not on file    Tobacco Lab Results   Component Value Date     (H) 05/31/2021    BUN 15 05/31/2021    CREATSERUM 0.75 05/31/2021    BUNCREA 20.0 05/31/2021    ANIONGAP 4 05/31/2021    GFRAA 124 05/31/2021    GFRNAA 107 05/31/2021    CA 9.1 05/31/2021     05/31/20 curette, or moistened gauze as listed below. Pt tolerated debridement well. Granular tissue/wound base was achieved with healthy bleeding noted. Bleeding was controlled with manual pressure and dry, sterile gauze.  Non-viable tissue removed from wound bed w and small vessel disease to aid in amputation healing     Prescribed ultram PRN at bedtime for pain x 30 days    RTC 1 week, will consider excision of the ischemic ulcer when adequate demarcation has been established.   Will also consider x rays if wound de

## 2021-06-05 ENCOUNTER — NURSE ONLY (OUTPATIENT)
Dept: HEMATOLOGY/ONCOLOGY | Facility: HOSPITAL | Age: 51
End: 2021-06-05
Attending: INTERNAL MEDICINE

## 2021-06-05 VITALS
SYSTOLIC BLOOD PRESSURE: 98 MMHG | HEART RATE: 89 BPM | DIASTOLIC BLOOD PRESSURE: 60 MMHG | RESPIRATION RATE: 16 BRPM | TEMPERATURE: 97 F | OXYGEN SATURATION: 99 %

## 2021-06-05 DIAGNOSIS — B95.8 STAPH INFECTION: Primary | ICD-10-CM

## 2021-06-05 DIAGNOSIS — L03.115 CELLULITIS OF RIGHT LOWER EXTREMITY: ICD-10-CM

## 2021-06-05 PROCEDURE — 96365 THER/PROPH/DIAG IV INF INIT: CPT

## 2021-06-05 RX ORDER — ERTAPENEM 1 G/1
INJECTION, POWDER, LYOPHILIZED, FOR SOLUTION INTRAMUSCULAR; INTRAVENOUS
Status: DISPENSED
Start: 2021-06-05 | End: 2021-06-05

## 2021-06-05 RX ORDER — 0.9 % SODIUM CHLORIDE 0.9 %
INTRAVENOUS SOLUTION INTRAVENOUS
Status: DISPENSED
Start: 2021-06-05 | End: 2021-06-05

## 2021-06-05 NOTE — PROGRESS NOTES
Pt to infusion for daily Invanz to treat right foot gangrenous diabetic wound. Surgical shoe to right foot; Using rolling scooter. Denies fever or chills.   Some pain in foot overnight, relieved this am.  Monitoring blood sugars closely, trying to keep und

## 2021-06-06 ENCOUNTER — NURSE ONLY (OUTPATIENT)
Dept: HEMATOLOGY/ONCOLOGY | Facility: HOSPITAL | Age: 51
End: 2021-06-06
Attending: INTERNAL MEDICINE

## 2021-06-06 VITALS
OXYGEN SATURATION: 100 % | RESPIRATION RATE: 18 BRPM | SYSTOLIC BLOOD PRESSURE: 99 MMHG | TEMPERATURE: 97 F | HEART RATE: 84 BPM | DIASTOLIC BLOOD PRESSURE: 62 MMHG

## 2021-06-06 DIAGNOSIS — B95.8 STAPH INFECTION: Primary | ICD-10-CM

## 2021-06-06 DIAGNOSIS — L03.115 CELLULITIS OF RIGHT LOWER EXTREMITY: ICD-10-CM

## 2021-06-06 PROCEDURE — 96365 THER/PROPH/DIAG IV INF INIT: CPT

## 2021-06-06 RX ORDER — 0.9 % SODIUM CHLORIDE 0.9 %
INTRAVENOUS SOLUTION INTRAVENOUS
Status: DISPENSED
Start: 2021-06-06 | End: 2021-06-06

## 2021-06-06 RX ORDER — ERTAPENEM 1 G/1
INJECTION, POWDER, LYOPHILIZED, FOR SOLUTION INTRAMUSCULAR; INTRAVENOUS
Status: DISPENSED
Start: 2021-06-06 | End: 2021-06-06

## 2021-06-06 NOTE — PROGRESS NOTES
Pt to infusion for daily Invanz to treat right foot gangrenous diabetic wound. Surgical shoe to right foot; Using rolling scooter. Denies fever or chills. Dressing remains intact to foot, no drainage noted. Caroline Enriquez has been instructed to keep dressing intact,

## 2021-06-07 ENCOUNTER — NURSE ONLY (OUTPATIENT)
Dept: HEMATOLOGY/ONCOLOGY | Facility: HOSPITAL | Age: 51
End: 2021-06-07
Attending: INTERNAL MEDICINE

## 2021-06-07 VITALS
SYSTOLIC BLOOD PRESSURE: 105 MMHG | HEART RATE: 84 BPM | TEMPERATURE: 98 F | RESPIRATION RATE: 16 BRPM | OXYGEN SATURATION: 100 % | DIASTOLIC BLOOD PRESSURE: 68 MMHG

## 2021-06-07 DIAGNOSIS — B95.8 STAPH INFECTION: Primary | ICD-10-CM

## 2021-06-07 DIAGNOSIS — L03.115 CELLULITIS OF RIGHT LOWER EXTREMITY: ICD-10-CM

## 2021-06-07 PROCEDURE — 80053 COMPREHEN METABOLIC PANEL: CPT

## 2021-06-07 PROCEDURE — 96365 THER/PROPH/DIAG IV INF INIT: CPT

## 2021-06-07 PROCEDURE — 85025 COMPLETE CBC W/AUTO DIFF WBC: CPT

## 2021-06-07 PROCEDURE — 86140 C-REACTIVE PROTEIN: CPT

## 2021-06-07 RX ORDER — 0.9 % SODIUM CHLORIDE 0.9 %
INTRAVENOUS SOLUTION INTRAVENOUS
Status: DISCONTINUED
Start: 2021-06-07 | End: 2021-06-07

## 2021-06-07 RX ORDER — ERTAPENEM 1 G/1
INJECTION, POWDER, LYOPHILIZED, FOR SOLUTION INTRAMUSCULAR; INTRAVENOUS
Status: DISCONTINUED
Start: 2021-06-07 | End: 2021-06-07

## 2021-06-07 NOTE — TELEPHONE ENCOUNTER
Axel King called back to confirm that pt will have ID appointment on 6/10 at 2:30 which will determine EOT. Will have pt come earlier on 6/10 to have invanz infusion.

## 2021-06-07 NOTE — PROGRESS NOTES
Pt to infusion for daily Invanz to treat right foot gangrenous diabetic wound. Surgical shoe to right foot; using crutches  Labs drawn and sent, invanz infused with no s/s of adverse reaction noted. Does state he gets tired easily.   Discharged stable.

## 2021-06-08 ENCOUNTER — NURSE ONLY (OUTPATIENT)
Dept: HEMATOLOGY/ONCOLOGY | Facility: HOSPITAL | Age: 51
End: 2021-06-08
Attending: INTERNAL MEDICINE

## 2021-06-08 VITALS
HEART RATE: 93 BPM | RESPIRATION RATE: 16 BRPM | OXYGEN SATURATION: 99 % | TEMPERATURE: 98 F | DIASTOLIC BLOOD PRESSURE: 62 MMHG | SYSTOLIC BLOOD PRESSURE: 100 MMHG

## 2021-06-08 DIAGNOSIS — L03.115 CELLULITIS OF RIGHT LOWER EXTREMITY: ICD-10-CM

## 2021-06-08 DIAGNOSIS — B95.8 STAPH INFECTION: Primary | ICD-10-CM

## 2021-06-08 PROCEDURE — 96365 THER/PROPH/DIAG IV INF INIT: CPT

## 2021-06-08 RX ORDER — ERTAPENEM 1 G/1
INJECTION, POWDER, LYOPHILIZED, FOR SOLUTION INTRAMUSCULAR; INTRAVENOUS
Status: DISCONTINUED
Start: 2021-06-08 | End: 2021-06-08

## 2021-06-08 RX ORDER — 0.9 % SODIUM CHLORIDE 0.9 %
INTRAVENOUS SOLUTION INTRAVENOUS
Status: DISCONTINUED
Start: 2021-06-08 | End: 2021-06-08

## 2021-06-08 NOTE — PROGRESS NOTES
Pt to infusion for daily Invanz to treat right foot gangrenous diabetic wound. Surgical shoe to right foot; using crutches. Weight bearing as tolerated. Feels pain is improved. PICC to left upper arm. Invanz infused with no s/s of adverse reaction noted.

## 2021-06-09 ENCOUNTER — NURSE ONLY (OUTPATIENT)
Dept: HEMATOLOGY/ONCOLOGY | Facility: HOSPITAL | Age: 51
End: 2021-06-09
Attending: INTERNAL MEDICINE

## 2021-06-09 VITALS
RESPIRATION RATE: 16 BRPM | OXYGEN SATURATION: 99 % | HEART RATE: 101 BPM | SYSTOLIC BLOOD PRESSURE: 122 MMHG | DIASTOLIC BLOOD PRESSURE: 67 MMHG | TEMPERATURE: 98 F

## 2021-06-09 DIAGNOSIS — B95.8 STAPH INFECTION: Primary | ICD-10-CM

## 2021-06-09 DIAGNOSIS — L03.115 CELLULITIS OF RIGHT LOWER EXTREMITY: ICD-10-CM

## 2021-06-09 PROCEDURE — 96365 THER/PROPH/DIAG IV INF INIT: CPT

## 2021-06-09 RX ORDER — 0.9 % SODIUM CHLORIDE 0.9 %
INTRAVENOUS SOLUTION INTRAVENOUS
Status: DISCONTINUED
Start: 2021-06-09 | End: 2021-06-09

## 2021-06-09 RX ORDER — ERTAPENEM 1 G/1
INJECTION, POWDER, LYOPHILIZED, FOR SOLUTION INTRAMUSCULAR; INTRAVENOUS
Status: DISCONTINUED
Start: 2021-06-09 | End: 2021-06-09

## 2021-06-10 ENCOUNTER — NURSE ONLY (OUTPATIENT)
Dept: HEMATOLOGY/ONCOLOGY | Facility: HOSPITAL | Age: 51
End: 2021-06-10
Attending: INTERNAL MEDICINE

## 2021-06-10 VITALS
SYSTOLIC BLOOD PRESSURE: 95 MMHG | OXYGEN SATURATION: 99 % | TEMPERATURE: 98 F | RESPIRATION RATE: 16 BRPM | DIASTOLIC BLOOD PRESSURE: 63 MMHG | HEART RATE: 90 BPM

## 2021-06-10 DIAGNOSIS — L03.115 CELLULITIS OF RIGHT LOWER EXTREMITY: ICD-10-CM

## 2021-06-10 DIAGNOSIS — B95.8 STAPH INFECTION: Primary | ICD-10-CM

## 2021-06-10 PROCEDURE — 96365 THER/PROPH/DIAG IV INF INIT: CPT

## 2021-06-10 RX ORDER — 0.9 % SODIUM CHLORIDE 0.9 %
INTRAVENOUS SOLUTION INTRAVENOUS
Status: DISCONTINUED
Start: 2021-06-10 | End: 2021-06-10

## 2021-06-10 RX ORDER — ERTAPENEM 1 G/1
INJECTION, POWDER, LYOPHILIZED, FOR SOLUTION INTRAMUSCULAR; INTRAVENOUS
Status: DISCONTINUED
Start: 2021-06-10 | End: 2021-06-10

## 2021-06-10 RX ORDER — 0.9 % SODIUM CHLORIDE 0.9 %
INTRAVENOUS SOLUTION INTRAVENOUS
Status: DISCONTINUED
Start: 2021-06-10 | End: 2021-06-10 | Stop reason: WASHOUT

## 2021-06-10 RX ORDER — ERTAPENEM 1 G/1
INJECTION, POWDER, LYOPHILIZED, FOR SOLUTION INTRAMUSCULAR; INTRAVENOUS
Status: DISCONTINUED
Start: 2021-06-10 | End: 2021-06-10 | Stop reason: WASHOUT

## 2021-06-10 NOTE — PROGRESS NOTES
Pt to infusion for daily Invanz to treat right foot gangrenous diabetic wound. PICC to left upper arm- deferred dressing change to 6/11 for possible PICC removal at Dr. Luciana Lamb office this afternoon.   Invanz infused with no s/s of adverse reaction not

## 2021-06-14 ENCOUNTER — OFFICE VISIT (OUTPATIENT)
Dept: PODIATRY CLINIC | Facility: CLINIC | Age: 51
End: 2021-06-14
Payer: OTHER MISCELLANEOUS

## 2021-06-14 DIAGNOSIS — Z89.419 HISTORY OF AMPUTATION OF GREAT TOE (HCC): ICD-10-CM

## 2021-06-14 DIAGNOSIS — E08.621 DIABETIC ULCER OF OTHER PART OF RIGHT FOOT ASSOCIATED WITH DIABETES MELLITUS DUE TO UNDERLYING CONDITION, WITH FAT LAYER EXPOSED (HCC): ICD-10-CM

## 2021-06-14 DIAGNOSIS — E11.65 UNCONTROLLED TYPE 2 DIABETES MELLITUS WITH HYPERGLYCEMIA (HCC): ICD-10-CM

## 2021-06-14 DIAGNOSIS — I73.9 PERIPHERAL ARTERIAL DISEASE WITH HISTORY OF REVASCULARIZATION (HCC): Primary | ICD-10-CM

## 2021-06-14 DIAGNOSIS — L97.512 DIABETIC ULCER OF OTHER PART OF RIGHT FOOT ASSOCIATED WITH DIABETES MELLITUS DUE TO UNDERLYING CONDITION, WITH FAT LAYER EXPOSED (HCC): ICD-10-CM

## 2021-06-14 DIAGNOSIS — M79.671 ISCHEMIC PAIN OF FOOT, RIGHT: ICD-10-CM

## 2021-06-14 DIAGNOSIS — I99.8 ISCHEMIC PAIN OF FOOT, RIGHT: ICD-10-CM

## 2021-06-14 DIAGNOSIS — Z98.890 PERIPHERAL ARTERIAL DISEASE WITH HISTORY OF REVASCULARIZATION (HCC): Primary | ICD-10-CM

## 2021-06-14 PROCEDURE — 99213 OFFICE O/P EST LOW 20 MIN: CPT | Performed by: PODIATRIST

## 2021-06-14 NOTE — PROGRESS NOTES
[facility], [facility] Podiatry  Progress Note    Thierno Beth is a 49year old male. Patient presents with: Follow - Up: f/up after amputation of right great toe. denies any pain.          HPI:     This is a pleasant poorly controlled diabetic male with PAD S History reviewed. No pertinent surgical history. History reviewed. No pertinent family history.    Social History    Socioeconomic History      Marital status: Single      Spouse name: Not on file      Number of children: Not on file      Years of educa Examination:  Muscle Strength is 5/5.   Right partial 1st ray amputation   POP to right foot    LABS & IMAGING:     Lab Results   Component Value Date     (H) 06/07/2021    BUN 9 06/07/2021    CREATSERUM 0.71 06/07/2021    BUNCREA 12.7 06/07/2021 size)  Sharp excisional debridement of wound was performed to the level of and including subcutaneous tissue with #15 blade, dermal curette, or moistened gauze as listed below. Pt tolerated debridement well.  Granular tissue/wound base was achieved with hea uncontrolled DM and smoking.       ID has given patient oral augmentin x 2 weeks and has discontinued IV abx     Pt to cont anticoagulants due to PVD and small vessel disease to aid in amputation healing     Cont ultram PRN at bedtime for pain    Prescribe

## 2021-06-23 ENCOUNTER — OFFICE VISIT (OUTPATIENT)
Dept: PODIATRY CLINIC | Facility: CLINIC | Age: 51
End: 2021-06-23
Payer: OTHER MISCELLANEOUS

## 2021-06-23 ENCOUNTER — TELEPHONE (OUTPATIENT)
Dept: PODIATRY CLINIC | Facility: CLINIC | Age: 51
End: 2021-06-23

## 2021-06-23 ENCOUNTER — HOSPITAL ENCOUNTER (OUTPATIENT)
Dept: GENERAL RADIOLOGY | Age: 51
Discharge: HOME | End: 2021-06-23
Attending: PODIATRIST

## 2021-06-23 DIAGNOSIS — Z89.419 HISTORY OF AMPUTATION OF GREAT TOE (HCC): ICD-10-CM

## 2021-06-23 DIAGNOSIS — E08.621 DIABETIC ULCER OF OTHER PART OF RIGHT FOOT ASSOCIATED WITH DIABETES MELLITUS DUE TO UNDERLYING CONDITION, WITH FAT LAYER EXPOSED (HCC): ICD-10-CM

## 2021-06-23 DIAGNOSIS — E11.65 UNCONTROLLED TYPE 2 DIABETES MELLITUS WITH HYPERGLYCEMIA (HCC): ICD-10-CM

## 2021-06-23 DIAGNOSIS — L97.512 DIABETIC ULCER OF OTHER PART OF RIGHT FOOT ASSOCIATED WITH DIABETES MELLITUS DUE TO UNDERLYING CONDITION, WITH FAT LAYER EXPOSED (HCC): ICD-10-CM

## 2021-06-23 DIAGNOSIS — Z98.890 PERIPHERAL ARTERIAL DISEASE WITH HISTORY OF REVASCULARIZATION (HCC): Primary | ICD-10-CM

## 2021-06-23 DIAGNOSIS — M79.671 ISCHEMIC PAIN OF FOOT, RIGHT: ICD-10-CM

## 2021-06-23 DIAGNOSIS — I99.8 ISCHEMIC PAIN OF FOOT, RIGHT: ICD-10-CM

## 2021-06-23 DIAGNOSIS — I73.9 PERIPHERAL ARTERIAL DISEASE WITH HISTORY OF REVASCULARIZATION (HCC): Primary | ICD-10-CM

## 2021-06-23 PROCEDURE — 11042 DBRDMT SUBQ TIS 1ST 20SQCM/<: CPT | Performed by: PODIATRIST

## 2021-06-23 PROCEDURE — 73630 X-RAY EXAM OF FOOT: CPT | Performed by: PODIATRIST

## 2021-06-23 PROCEDURE — 99213 OFFICE O/P EST LOW 20 MIN: CPT | Performed by: PODIATRIST

## 2021-06-23 NOTE — PROGRESS NOTES
[facility], [facility] Podiatry  Progress Note    Izabel Lucero is a 49year old male. Patient presents with: Follow - Up: 1 wk f/u, pt states that hes been feeling good, fbs today was 177. last A1C 10.4 on 4/23/21.  pt states that he has not seen his pcp since [facility])       History reviewed. No pertinent surgical history. History reviewed. No pertinent family history.    Social History    Socioeconomic History      Marital status: Single      Spouse name: Not on file      Number of children: Not on file      Year Examination:  Muscle Strength is 5/5.   Right partial 1st ray amputation   POP to right foot    LABS & IMAGING:     Lab Results   Component Value Date     (H) 06/07/2021    BUN 9 06/07/2021    CREATSERUM 0.71 06/07/2021    BUNCREA 12.7 06/07/2021 gauze as listed below. Pt tolerated debridement well. Granular tissue/wound base was achieved with healthy bleeding noted. Bleeding was controlled with manual pressure and dry, sterile gauze. Non-viable tissue removed from wound bed with debridement.     In tomorrow.      Pt to cont anticoagulants due to PVD and small vessel disease to aid in amputation healing     Cont ultram PRN at bedtime for pain    Prescribed x rays right foot to evaluate partial 1st ray amputation site, he did not get these last visit

## 2021-06-23 NOTE — TELEPHONE ENCOUNTER
Patient completed TOMER form to release Dr. Mcknight [place] notes to his  Kacy Corrales. TOMER faxed to scan stat for completion [phone], fax confirmation received. 19-Jun-2020 00:26 19-Jun-2020 00:46

## 2021-06-30 ENCOUNTER — OFFICE VISIT (OUTPATIENT)
Dept: PODIATRY CLINIC | Facility: CLINIC | Age: 51
End: 2021-06-30
Payer: OTHER MISCELLANEOUS

## 2021-06-30 DIAGNOSIS — E11.65 UNCONTROLLED TYPE 2 DIABETES MELLITUS WITH HYPERGLYCEMIA (HCC): ICD-10-CM

## 2021-06-30 DIAGNOSIS — I99.8 ISCHEMIC PAIN OF FOOT, RIGHT: ICD-10-CM

## 2021-06-30 DIAGNOSIS — Z98.890 PERIPHERAL ARTERIAL DISEASE WITH HISTORY OF REVASCULARIZATION (HCC): Primary | ICD-10-CM

## 2021-06-30 DIAGNOSIS — I73.9 PERIPHERAL ARTERIAL DISEASE WITH HISTORY OF REVASCULARIZATION (HCC): Primary | ICD-10-CM

## 2021-06-30 DIAGNOSIS — M79.671 ISCHEMIC PAIN OF FOOT, RIGHT: ICD-10-CM

## 2021-06-30 DIAGNOSIS — Z89.419 HISTORY OF AMPUTATION OF GREAT TOE (HCC): ICD-10-CM

## 2021-06-30 DIAGNOSIS — E08.621 DIABETIC ULCER OF OTHER PART OF RIGHT FOOT ASSOCIATED WITH DIABETES MELLITUS DUE TO UNDERLYING CONDITION, WITH FAT LAYER EXPOSED (HCC): ICD-10-CM

## 2021-06-30 DIAGNOSIS — L97.512 DIABETIC ULCER OF OTHER PART OF RIGHT FOOT ASSOCIATED WITH DIABETES MELLITUS DUE TO UNDERLYING CONDITION, WITH FAT LAYER EXPOSED (HCC): ICD-10-CM

## 2021-06-30 PROCEDURE — 11042 DBRDMT SUBQ TIS 1ST 20SQCM/<: CPT | Performed by: PODIATRIST

## 2021-06-30 NOTE — PROGRESS NOTES
[facility], [facility] Podiatry  Progress Note    Aram Mancilla is a 49year old male. Patient presents with: Follow - Up: 1 week follow up. BS this morning was 186. Patient states has had some pain and throbbing on left foot for the past couple of days.  A1C o status: Former Smoker      Smokeless tobacco: Never Used    Vaping Use      Vaping Use: Never used    Substance and Sexual Activity      Alcohol use: Yes      Drug use: Yes        Types: Cannabis          REVIEW OF SYSTEMS:   Denies nausea, fever, chills  06/07/2021    K 3.9 06/07/2021     06/07/2021    CO2 27.0 06/07/2021    OSMOCALC 294 06/07/2021        Lab Results   Component Value Date     (H) 04/23/2021    A1C 10.4 (H) 04/23/2021        No results found.      ASSESSMENT AND PLAN: Excised: Sub-cutaneous  Wound A: Yes  Wound B:  No  Wound C:  No   Was the removal of viable tissue evidenced by active bleeding?   Yes   Percentage of wound requiring debridement (if entire wound is larger than 20 sq cm)  Wound A: 100 % Wound B: _ % Wound

## 2021-07-07 ENCOUNTER — PATIENT MESSAGE (OUTPATIENT)
Dept: PODIATRY CLINIC | Facility: CLINIC | Age: 51
End: 2021-07-07

## 2021-07-07 RX ORDER — HYDROCODONE BITARTRATE AND ACETAMINOPHEN 5; 325 MG/1; MG/1
1 TABLET ORAL EVERY 8 HOURS PRN
Qty: 15 TABLET | Refills: 0 | Status: SHIPPED | OUTPATIENT
Start: 2021-07-07 | End: 2021-07-12

## 2021-07-07 NOTE — TELEPHONE ENCOUNTER
Please inform patient that his pain and swelling could be due to his vascular disease as well. I will go ahead and send over to his pharmacy [facility] to take PRN for pain.   If the pain and swelling persists and does not improve he is to go to the ER at [facility]

## 2021-07-07 NOTE — TELEPHONE ENCOUNTER
S/w pt and he states on 7/5/21 he started to have more pain/swelling in foot/ankle. He removed the ace wrap, but left the rest of the bandages on and he said that helped bring the pain from 9/10 to 4/10 and swelling went down as well.  Pt denies any fever o

## 2021-07-07 NOTE — TELEPHONE ENCOUNTER
From: Ar Evans  To: Jayson Crook DPM  Sent: 7/7/2021 2:21 PM CDT  Subject: Other    Can you give me a call im starting to feel alot pain these past couple of days I want if its possible to see u sooner than my schedule ashlee on the 12th

## 2021-07-07 NOTE — TELEPHONE ENCOUNTER
Pt notified per Dr. Leida Irvin message and orders and he verbalized understanding. He will go to ER if pain and swelling persists.

## 2021-07-12 ENCOUNTER — OFFICE VISIT (OUTPATIENT)
Dept: PODIATRY CLINIC | Facility: CLINIC | Age: 51
End: 2021-07-12
Payer: OTHER MISCELLANEOUS

## 2021-07-12 ENCOUNTER — HOSPITAL ENCOUNTER (OUTPATIENT)
Dept: GENERAL RADIOLOGY | Age: 51
Discharge: HOME | End: 2021-07-12
Attending: PODIATRIST

## 2021-07-12 DIAGNOSIS — Z98.890 PERIPHERAL ARTERIAL DISEASE WITH HISTORY OF REVASCULARIZATION (HCC): Primary | ICD-10-CM

## 2021-07-12 DIAGNOSIS — E11.65 UNCONTROLLED TYPE 2 DIABETES MELLITUS WITH HYPERGLYCEMIA (HCC): ICD-10-CM

## 2021-07-12 DIAGNOSIS — L97.512 DIABETIC ULCER OF OTHER PART OF RIGHT FOOT ASSOCIATED WITH DIABETES MELLITUS DUE TO UNDERLYING CONDITION, WITH FAT LAYER EXPOSED (HCC): ICD-10-CM

## 2021-07-12 DIAGNOSIS — I73.9 PERIPHERAL ARTERIAL DISEASE WITH HISTORY OF REVASCULARIZATION (HCC): Primary | ICD-10-CM

## 2021-07-12 DIAGNOSIS — I99.8 ISCHEMIC PAIN OF FOOT, RIGHT: ICD-10-CM

## 2021-07-12 DIAGNOSIS — Z89.419 HISTORY OF AMPUTATION OF GREAT TOE (HCC): ICD-10-CM

## 2021-07-12 DIAGNOSIS — E08.621 DIABETIC ULCER OF OTHER PART OF RIGHT FOOT ASSOCIATED WITH DIABETES MELLITUS DUE TO UNDERLYING CONDITION, WITH FAT LAYER EXPOSED (HCC): ICD-10-CM

## 2021-07-12 DIAGNOSIS — M79.671 ISCHEMIC PAIN OF FOOT, RIGHT: ICD-10-CM

## 2021-07-12 PROCEDURE — 11042 DBRDMT SUBQ TIS 1ST 20SQCM/<: CPT | Performed by: PODIATRIST

## 2021-07-12 PROCEDURE — 73630 X-RAY EXAM OF FOOT: CPT | Performed by: PODIATRIST

## 2021-07-12 NOTE — PROGRESS NOTES
[facility] Podiatry  Progress Note    Izabel Lucero is a 49year old male. Patient presents with: Follow - Up: 2 week follow up. Patient states pain and some swelling. BS this morning 187. Recent A1C on 4/23/21, 10.4. Pain scale 3-4/10.          HPI Use      Vaping Use: Never used    Substance and Sexual Activity      Alcohol use: Yes      Drug use: Yes        Types: Cannabis          REVIEW OF SYSTEMS:   Denies nausea, fever, chills  No calf pain  Denies chest pain or shortness of breath.       EXAM: CO2 27.0 06/07/2021    OSMOCALC 294 06/07/2021        Lab Results   Component Value Date     (H) 04/23/2021    A1C 10.4 (H) 04/23/2021        No results found.      ASSESSMENT AND PLAN:   Diagnoses and all orders for this visit:    Peripheral alejandra No   Was the removal of viable tissue evidenced by active bleeding?   Yes   Percentage of wound requiring debridement (if entire wound is larger than 20 sq cm)  Wound A: 100 % Wound B: _ % Wound C: _ %     Measurements of each wound after debridement (if wo amputation. No follow-ups on file.     Darnell Mcmahon DPM  7/12/21

## 2021-07-14 ENCOUNTER — TELEPHONE (OUTPATIENT)
Dept: ENDOCRINOLOGY CLINIC | Facility: CLINIC | Age: 51
End: 2021-07-14

## 2021-07-14 ENCOUNTER — OFFICE VISIT (OUTPATIENT)
Dept: ENDOCRINOLOGY CLINIC | Facility: CLINIC | Age: 51
End: 2021-07-14
Payer: OTHER MISCELLANEOUS

## 2021-07-14 VITALS
WEIGHT: 185.19 LBS | HEART RATE: 98 BPM | BODY MASS INDEX: 25 KG/M2 | SYSTOLIC BLOOD PRESSURE: 129 MMHG | DIASTOLIC BLOOD PRESSURE: 76 MMHG

## 2021-07-14 DIAGNOSIS — E11.65 UNCONTROLLED TYPE 2 DIABETES MELLITUS WITH HYPERGLYCEMIA (HCC): Primary | ICD-10-CM

## 2021-07-14 LAB
CARTRIDGE LOT#: ABNORMAL NUMERIC
HEMOGLOBIN A1C: 10.2 % (ref 4.3–5.6)
TEST STRIP LOT #: NORMAL NUMERIC

## 2021-07-14 PROCEDURE — 83036 HEMOGLOBIN GLYCOSYLATED A1C: CPT | Performed by: NURSE PRACTITIONER

## 2021-07-14 PROCEDURE — 3078F DIAST BP <80 MM HG: CPT | Performed by: NURSE PRACTITIONER

## 2021-07-14 PROCEDURE — 99204 OFFICE O/P NEW MOD 45 MIN: CPT | Performed by: NURSE PRACTITIONER

## 2021-07-14 PROCEDURE — 36416 COLLJ CAPILLARY BLOOD SPEC: CPT | Performed by: NURSE PRACTITIONER

## 2021-07-14 PROCEDURE — 3074F SYST BP LT 130 MM HG: CPT | Performed by: NURSE PRACTITIONER

## 2021-07-14 PROCEDURE — 3046F HEMOGLOBIN A1C LEVEL >9.0%: CPT | Performed by: NURSE PRACTITIONER

## 2021-07-14 PROCEDURE — 82947 ASSAY GLUCOSE BLOOD QUANT: CPT | Performed by: NURSE PRACTITIONER

## 2021-07-14 RX ORDER — BLOOD SUGAR DIAGNOSTIC
1 STRIP MISCELLANEOUS 2 TIMES DAILY
Qty: 180 EACH | Refills: 1 | Status: SHIPPED | OUTPATIENT
Start: 2021-07-14

## 2021-07-14 RX ORDER — LANCETS
EACH MISCELLANEOUS
Qty: 200 EACH | Refills: 1 | Status: SHIPPED | OUTPATIENT
Start: 2021-07-14

## 2021-07-14 RX ORDER — GLIPIZIDE 5 MG/1
TABLET ORAL
Qty: 180 TABLET | Refills: 0 | Status: SHIPPED | OUTPATIENT
Start: 2021-07-14 | End: 2021-10-04

## 2021-07-14 NOTE — PROGRESS NOTES
Name: Narda Babinski  Date: 7/14/2021    Referring Physician: No ref. provider found    CHIEF COMPLAINT   No chief complaint on file.       HISTORY OF PRESENT ILLNESS   Narda Babinski is a 49year old male who presents for new consultation on diabetes man ostomylietis     REVIEW OF SYSTEMS  Constitutional: Negative for: weight change, fever, fatigue, cold/heat intolerance  Eyes: Negative for:  Visual changes, proptosis, blurring  ENT: Negative for:  dysphagia, neck swelling, dysphonia  Respiratory: Negative Drug use: Yes        Types: Cannabis    PAST MEDICAL HISTORY:   Past Medical History:   Diagnosis Date   • Amputation of great toe, right, traumatic ([facility] 04/23/2021   • Diabetes ([facility]    • Gangrene of toe of right foot ([facility]    • Heart attack ([facility]      P to avoid eating snacks between meals  -patient was given low cho diet handout for reference  -reviewed ABCs of diabetes and handout given for reference  -reviewed target goal BG readings and A1C  -patient plans to have foot procedure soon and would like to

## 2021-07-14 NOTE — PATIENT INSTRUCTIONS
A1C: 10.2 today --> from 10.4% on 4/2021  Blood glucose: '[facility]' in clinic today    Medications:   Re-start metformin 1,000mg with breakfast     1,000mg with dinner    Start glipizide 5mg with breakfast     5mg with dinner       Blood sugar testing:  Test yo

## 2021-07-19 ENCOUNTER — OFFICE VISIT (OUTPATIENT)
Dept: PODIATRY CLINIC | Facility: CLINIC | Age: 51
End: 2021-07-19

## 2021-07-19 ENCOUNTER — HOSPITAL ENCOUNTER (INPATIENT)
Facility: HOSPITAL | Age: 51
LOS: 3 days | Discharge: HOME | DRG: 475 | End: 2021-07-23
Attending: EMERGENCY MEDICINE | Admitting: HOSPITALIST

## 2021-07-19 DIAGNOSIS — L03.115 CELLULITIS OF RIGHT FOOT: ICD-10-CM

## 2021-07-19 DIAGNOSIS — M79.671 ISCHEMIC PAIN OF FOOT, RIGHT: ICD-10-CM

## 2021-07-19 DIAGNOSIS — L97.512 DIABETIC ULCER OF OTHER PART OF RIGHT FOOT ASSOCIATED WITH DIABETES MELLITUS DUE TO UNDERLYING CONDITION, WITH FAT LAYER EXPOSED (HCC): ICD-10-CM

## 2021-07-19 DIAGNOSIS — L03.115 CELLULITIS OF RIGHT FOOT: Primary | ICD-10-CM

## 2021-07-19 DIAGNOSIS — E11.65 UNCONTROLLED TYPE 2 DIABETES MELLITUS WITH HYPERGLYCEMIA (HCC): ICD-10-CM

## 2021-07-19 DIAGNOSIS — E08.621 DIABETIC ULCER OF OTHER PART OF RIGHT FOOT ASSOCIATED WITH DIABETES MELLITUS DUE TO UNDERLYING CONDITION, WITH FAT LAYER EXPOSED (HCC): ICD-10-CM

## 2021-07-19 DIAGNOSIS — I99.8 ISCHEMIC PAIN OF FOOT, RIGHT: ICD-10-CM

## 2021-07-19 DIAGNOSIS — I73.9 PERIPHERAL ARTERIAL DISEASE WITH HISTORY OF REVASCULARIZATION (HCC): ICD-10-CM

## 2021-07-19 DIAGNOSIS — M86.171 ACUTE OSTEOMYELITIS OF METATARSAL BONE OF RIGHT FOOT (HCC): Primary | ICD-10-CM

## 2021-07-19 DIAGNOSIS — Z89.419 HISTORY OF AMPUTATION OF GREAT TOE (HCC): ICD-10-CM

## 2021-07-19 DIAGNOSIS — Z98.890 PERIPHERAL ARTERIAL DISEASE WITH HISTORY OF REVASCULARIZATION (HCC): ICD-10-CM

## 2021-07-19 LAB
ANION GAP SERPL CALC-SCNC: 8 MMOL/L (ref 0–18)
BASOPHILS # BLD AUTO: 0.02 X10(3) UL (ref 0–0.2)
BASOPHILS NFR BLD AUTO: 0.2 %
BUN BLD-MCNC: 16 MG/DL (ref 7–18)
BUN/CREAT SERPL: 14.2 (ref 10–20)
CALCIUM BLD-MCNC: 8.8 MG/DL (ref 8.5–10.1)
CHLORIDE SERPL-SCNC: 103 MMOL/L (ref 98–112)
CO2 SERPL-SCNC: 27 MMOL/L (ref 21–32)
CREAT BLD-MCNC: 1.13 MG/DL
DEPRECATED RDW RBC AUTO: 43.6 FL (ref 35.1–46.3)
EOSINOPHIL # BLD AUTO: 0.17 X10(3) UL (ref 0–0.7)
EOSINOPHIL NFR BLD AUTO: 1.6 %
ERYTHROCYTE [DISTWIDTH] IN BLOOD BY AUTOMATED COUNT: 13.6 % (ref 11–15)
GLUCOSE BLD-MCNC: 344 MG/DL (ref 70–99)
HCT VFR BLD AUTO: 39.2 %
HGB BLD-MCNC: 13.3 G/DL
IMM GRANULOCYTES # BLD AUTO: 0.03 X10(3) UL (ref 0–1)
IMM GRANULOCYTES NFR BLD: 0.3 %
LYMPHOCYTES # BLD AUTO: 3.13 X10(3) UL (ref 1–4)
LYMPHOCYTES NFR BLD AUTO: 29.1 %
MCH RBC QN AUTO: 29.8 PG (ref 26–34)
MCHC RBC AUTO-ENTMCNC: 33.9 G/DL (ref 31–37)
MCV RBC AUTO: 87.7 FL
MONOCYTES # BLD AUTO: 0.87 X10(3) UL (ref 0.1–1)
MONOCYTES NFR BLD AUTO: 8.1 %
NEUTROPHILS # BLD AUTO: 6.53 X10 (3) UL (ref 1.5–7.7)
NEUTROPHILS # BLD AUTO: 6.53 X10(3) UL (ref 1.5–7.7)
NEUTROPHILS NFR BLD AUTO: 60.7 %
OSMOLALITY SERPL CALC.SUM OF ELEC: 301 MOSM/KG (ref 275–295)
PLATELET # BLD AUTO: 204 10(3)UL (ref 150–450)
POTASSIUM SERPL-SCNC: 3.7 MMOL/L (ref 3.5–5.1)
RBC # BLD AUTO: 4.47 X10(6)UL
SODIUM SERPL-SCNC: 138 MMOL/L (ref 136–145)
WBC # BLD AUTO: 10.8 X10(3) UL (ref 4–11)

## 2021-07-19 PROCEDURE — 99213 OFFICE O/P EST LOW 20 MIN: CPT | Performed by: PODIATRIST

## 2021-07-19 NOTE — PROGRESS NOTES
[facility], [facility] Podiatry  Progress Note    Izabel Lucero is a 49year old male. Patient presents with: Follow - Up: right foot -- Denies any pain or fever.  BG was 162 this AM.         HPI:     This is a pleasant poorly controlled diabetic male with PAD Socioeconomic History      Marital status: Single      Spouse name: Not on file      Number of children: Not on file      Years of education: Not on file      Highest education level: Not on file    Tobacco Use      Smoking status: Former Smoker      Smoke IMAGING:     Lab Results   Component Value Date     (H) 06/07/2021    BUN 9 06/07/2021    CREATSERUM 0.71 06/07/2021    BUNCREA 12.7 06/07/2021    ANIONGAP 5 06/07/2021    GFRAA 126 06/07/2021    GFRNAA 109 06/07/2021    CA 8.2 (L) 06/07/2021    NA topical wound care and follow-up instructions including offloading if applicable: betadine and DSD    Pt to keep dressings CDI.   Pt can be PWB with surgical shoe    Arterial doppler did demonstrate mild to moderate PAD right LE with tib peroneal occlusive

## 2021-07-20 ENCOUNTER — APPOINTMENT (OUTPATIENT)
Dept: MRI IMAGING | Facility: HOSPITAL | Age: 51
DRG: 475 | End: 2021-07-20
Attending: EMERGENCY MEDICINE

## 2021-07-20 LAB
ANION GAP SERPL CALC-SCNC: 5 MMOL/L (ref 0–18)
BASOPHILS # BLD AUTO: 0.03 X10(3) UL (ref 0–0.2)
BASOPHILS NFR BLD AUTO: 0.3 %
BUN BLD-MCNC: 11 MG/DL (ref 7–18)
BUN/CREAT SERPL: 15.5 (ref 10–20)
CALCIUM BLD-MCNC: 8.2 MG/DL (ref 8.5–10.1)
CHLORIDE SERPL-SCNC: 109 MMOL/L (ref 98–112)
CO2 SERPL-SCNC: 26 MMOL/L (ref 21–32)
CREAT BLD-MCNC: 0.71 MG/DL
DEPRECATED RDW RBC AUTO: 43.7 FL (ref 35.1–46.3)
EOSINOPHIL # BLD AUTO: 0.19 X10(3) UL (ref 0–0.7)
EOSINOPHIL NFR BLD AUTO: 2 %
ERYTHROCYTE [DISTWIDTH] IN BLOOD BY AUTOMATED COUNT: 13.4 % (ref 11–15)
EST. AVERAGE GLUCOSE BLD GHB EST-MCNC: 243 MG/DL (ref 68–126)
GLUCOSE BLD-MCNC: 131 MG/DL (ref 70–99)
GLUCOSE BLDC GLUCOMTR-MCNC: 133 MG/DL (ref 70–99)
GLUCOSE BLDC GLUCOMTR-MCNC: 144 MG/DL (ref 70–99)
GLUCOSE BLDC GLUCOMTR-MCNC: 173 MG/DL (ref 70–99)
GLUCOSE BLDC GLUCOMTR-MCNC: 236 MG/DL (ref 70–99)
GLUCOSE BLDC GLUCOMTR-MCNC: 263 MG/DL (ref 70–99)
GLUCOSE BLDC GLUCOMTR-MCNC: 275 MG/DL (ref 70–99)
GLUCOSE BLDC GLUCOMTR-MCNC: 316 MG/DL (ref 70–99)
HBA1C MFR BLD HPLC: 10.1 % (ref ?–5.7)
HCT VFR BLD AUTO: 39.6 %
HGB BLD-MCNC: 13.3 G/DL
IMM GRANULOCYTES # BLD AUTO: 0.03 X10(3) UL (ref 0–1)
IMM GRANULOCYTES NFR BLD: 0.3 %
LYMPHOCYTES # BLD AUTO: 2.51 X10(3) UL (ref 1–4)
LYMPHOCYTES NFR BLD AUTO: 26.7 %
MCH RBC QN AUTO: 29.7 PG (ref 26–34)
MCHC RBC AUTO-ENTMCNC: 33.6 G/DL (ref 31–37)
MCV RBC AUTO: 88.4 FL
MONOCYTES # BLD AUTO: 0.96 X10(3) UL (ref 0.1–1)
MONOCYTES NFR BLD AUTO: 10.2 %
NEUTROPHILS # BLD AUTO: 5.68 X10 (3) UL (ref 1.5–7.7)
NEUTROPHILS # BLD AUTO: 5.68 X10(3) UL (ref 1.5–7.7)
NEUTROPHILS NFR BLD AUTO: 60.5 %
OSMOLALITY SERPL CALC.SUM OF ELEC: 291 MOSM/KG (ref 275–295)
PLATELET # BLD AUTO: 177 10(3)UL (ref 150–450)
POTASSIUM SERPL-SCNC: 3.3 MMOL/L (ref 3.5–5.1)
RBC # BLD AUTO: 4.48 X10(6)UL
SARS-COV-2 RNA RESP QL NAA+PROBE: NOT DETECTED
SODIUM SERPL-SCNC: 140 MMOL/L (ref 136–145)
WBC # BLD AUTO: 9.4 X10(3) UL (ref 4–11)

## 2021-07-20 PROCEDURE — 99024 POSTOP FOLLOW-UP VISIT: CPT | Performed by: PODIATRIST

## 2021-07-20 PROCEDURE — 73718 MRI LOWER EXTREMITY W/O DYE: CPT | Performed by: EMERGENCY MEDICINE

## 2021-07-20 PROCEDURE — 3046F HEMOGLOBIN A1C LEVEL >9.0%: CPT | Performed by: NURSE PRACTITIONER

## 2021-07-20 RX ORDER — DEXTROSE MONOHYDRATE 25 G/50ML
50 INJECTION, SOLUTION INTRAVENOUS
Status: DISCONTINUED | OUTPATIENT
Start: 2021-07-20 | End: 2021-07-23

## 2021-07-20 RX ORDER — CLOPIDOGREL BISULFATE 75 MG/1
75 TABLET ORAL DAILY
Status: DISCONTINUED | OUTPATIENT
Start: 2021-07-20 | End: 2021-07-23

## 2021-07-20 RX ORDER — ATORVASTATIN CALCIUM 40 MG/1
40 TABLET, FILM COATED ORAL NIGHTLY
Status: DISCONTINUED | OUTPATIENT
Start: 2021-07-20 | End: 2021-07-23

## 2021-07-20 RX ORDER — SODIUM CHLORIDE 9 MG/ML
INJECTION, SOLUTION INTRAVENOUS CONTINUOUS
Status: DISCONTINUED | OUTPATIENT
Start: 2021-07-20 | End: 2021-07-22

## 2021-07-20 RX ORDER — ACETAMINOPHEN 325 MG/1
650 TABLET ORAL EVERY 6 HOURS PRN
Status: DISCONTINUED | OUTPATIENT
Start: 2021-07-20 | End: 2021-07-23

## 2021-07-20 RX ORDER — ASPIRIN 81 MG/1
81 TABLET, CHEWABLE ORAL DAILY
Status: DISCONTINUED | OUTPATIENT
Start: 2021-07-20 | End: 2021-07-23

## 2021-07-20 RX ORDER — POTASSIUM CHLORIDE 20 MEQ/1
40 TABLET, EXTENDED RELEASE ORAL ONCE
Status: COMPLETED | OUTPATIENT
Start: 2021-07-20 | End: 2021-07-20

## 2021-07-20 RX ORDER — ONDANSETRON 2 MG/ML
4 INJECTION INTRAMUSCULAR; INTRAVENOUS EVERY 6 HOURS PRN
Status: DISCONTINUED | OUTPATIENT
Start: 2021-07-20 | End: 2021-07-23

## 2021-07-20 RX ORDER — VANCOMYCIN/0.9 % SOD CHLORIDE 1.75 G/5
20 PLASTIC BAG, INJECTION (ML) INTRAVENOUS ONCE
Status: COMPLETED | OUTPATIENT
Start: 2021-07-20 | End: 2021-07-20

## 2021-07-20 NOTE — WOUND PROGRESS NOTE
Wound Care  Received nursing consult to see the pt., chart reviewed and Dr. Beatrice Chawla is following the pt.  Wound Sx is available if needed by the MD.

## 2021-07-20 NOTE — PLAN OF CARE
Problem: Patient Centered Care  Goal: Patient preferences are identified and integrated in the patient's plan of care  Description: Interventions:  - What would you like us to know as we care for you?  From home alone  - Provide timely, complete, and accu pressure ulcer development  - Assess and document skin integrity  - Assess and document dressing/incision, wound bed, drain sites and surrounding tissue  - Implement wound care per orders  - Initiate isolation precautions as appropriate  - Initiate Pressur Progressing     Vital signs are stable. Denies pain or discomfort. Plavix and aspirin held this morning for possible procedure. IVF infusing. On IV Vancomycin and Zosyn.   Fall precautions in place: call light within reach, bed in the lowest position, be

## 2021-07-20 NOTE — ED QUICK NOTES
Orders for admission, patient is aware of plan and ready to go upstairs. Any questions, please call ED RN Garcia Cox  at extension [phone].    Type of COVID test sent:Rapid  COVID Suspicion level: Low- vaccinated    Titratable drug(s) infusing:n/a  Rate:n/a

## 2021-07-20 NOTE — CM/SW NOTE
Pt discussed during nursing rounds, plan is for possible surgery/ I&D of rt foot. MRI planned for today and Podiatry to determine surgical intervention. CM self-referred for dc planning. Pt know to this CM.  Pt was discharged 5/21/21 home on daily IV ab

## 2021-07-20 NOTE — H&P
[facility] Hospitalist H&P       CC: concern for R great toe OM    PCP: None Pcp    History of Present Illness: Pt is a 50 yo with mmp including but not limited to DMII, PAD, R toe gangrene s/p amputation is admitted for residual R great toe OM.  Says [employer]  Comprehensive ROS reviewed and negative except for what's stated above. Including negative for chest pain, shortness of breath, syncope.        OBJECTIVE:  /67 (BP Location: Left arm)   Pulse 87   Temp 97.4 °F (36.3 °C) (Oral)   Resp 18   Ht records or previous hospital records reviewed. Further recommendations pending patient's clinical course.   [facility] hospitalist to continue to follow patient while in house    Patient and/or patient's family given opportunity to ask questions and note under

## 2021-07-20 NOTE — CONSULTS
[facility]    Report of Consultation    Gabriela Treviño Patient Status:  Inpatient    1970 MRN [id number]   Location [facility] Attending Luciana Quiroz, *   Hosp Day # 0 PCP None Pcp     Date of Admission:  20 Oral, Nightly  •  Piperacillin Sod-Tazobactam So (ZOSYN) 3.375 g in dextrose 5% 100 mL IVPB-ADDV, 3.375 g, Intravenous, Q8H  •  Insulin Regular Human (NOVOLIN R) 100 UNIT/ML injection 1-7 Units, 1-7 Units, Subcutaneous, 4 times per day  •  Vancomycin HCl ( NEPRELIM 5.68   WBC 9.4   .0       Impression and Plan:  Cellulitis and gangrene of right foot     Diabetic ulcer of other part of right foot associated with diabetes mellitus due to underlying condition, with fat layer exposed ([address]     Periphera

## 2021-07-20 NOTE — CONSULTS
[facility] AND [facility] Infectious Disease  Report of Consultation    Veena Welch Patient Status:  Inpatient    1970 MRN [id number]   Location [facility]W Attending La Sánchez, *   Hosp Day # 0 PCP None Pcp     Date of Admi Facility-Administered Medications:   •  ondansetron HCl (ZOFRAN) injection 4 mg, 4 mg, Intravenous, Q6H PRN  •  0.9% NaCl infusion, , Intravenous, Continuous  •  glucose (DEX4) oral liquid 15 g, 15 g, Oral, Q15 Min PRN **OR** Glucose-Vitamin C (DEX-4) chew diabetes, thyroid disorder. Remainder of 12 point review of systems otherwise negative.     Vital signs in last 24 hours:  Patient Vitals for the past 24 hrs:   BP Temp Temp src Pulse Resp SpO2 Height Weight   07/20/21 0937 124/73 97.6 °F (36.4 °C) Oral amputation through the mid diaphysis of the 1st metatarsal.    MRI requested    Antibiotics Reviewed:  Zosyn  Vancomycin     Assessment and Plan:     1.  Worsening necrosis and non-healing to the R great toe amputation site with h/o same and failure to imp

## 2021-07-20 NOTE — ED PROVIDER NOTES
Patient Seen in: [facility] AND [facility] Emergency Department    History   Patient presents with:  Cellulitis    Stated Complaint: cellulitis    HPI    Patient presents to the emergency department on the advice of his podiatrist to be admitted to the hospital. total) by mouth nightly. Review of Systems  Constitutional: no fever      Positive for stated complaint: cellulitis  Other systems are as noted in HPI. Constitutional and vital signs reviewed.       All other systems reviewed and negative except as surgeon. Patient remained stable while here. We did initiate vancomycin and Zosyn while here. Patient will be admitted.     ED Course     Labs Reviewed   BASIC METABOLIC PANEL (8) - Abnormal; Notable for the following components:       Result Value    Gl

## 2021-07-20 NOTE — PLAN OF CARE
Problem: Patient Centered Care  Goal: Patient preferences are identified and integrated in the patient's plan of care  Description: Interventions:  - What would you like us to know as we care for you?   - Provide timely, complete, and accurate informatio ulcer development  - Assess and document skin integrity  - Assess and document dressing/incision, wound bed, drain sites and surrounding tissue  - Implement wound care per orders  - Initiate isolation precautions as appropriate  - Initiate Pressure Ulcer p Problem: DISCHARGE PLANNING  Goal: Discharge to home or other facility with appropriate resources  Description: INTERVENTIONS:  - Identify barriers to discharge w/pt and caregiver  - Include patient/family/discharge partner in discharge Paul Ivey

## 2021-07-20 NOTE — ED INITIAL ASSESSMENT (HPI)
Pt referred to ED by Dr. Addie Rosen for IV abx, MRI on 1st metatarsal and possible debridement of wound. Pt has an infected amputated 1st toe to right foot. Toe was amputated on April 26th. Denies fevers. Pt reports bloody drainage from wound.

## 2021-07-21 PROBLEM — L02.611 ABSCESS OF RIGHT FOOT: Status: ACTIVE | Noted: 2021-07-21

## 2021-07-21 PROBLEM — M86.171 ACUTE OSTEOMYELITIS OF METATARSAL BONE OF RIGHT FOOT (HCC): Status: ACTIVE | Noted: 2021-07-21

## 2021-07-21 LAB
GLUCOSE BLDC GLUCOMTR-MCNC: 198 MG/DL (ref 70–99)
GLUCOSE BLDC GLUCOMTR-MCNC: 221 MG/DL (ref 70–99)
GLUCOSE BLDC GLUCOMTR-MCNC: 229 MG/DL (ref 70–99)
GLUCOSE BLDC GLUCOMTR-MCNC: 291 MG/DL (ref 70–99)
POTASSIUM SERPL-SCNC: 4 MMOL/L (ref 3.5–5.1)

## 2021-07-21 PROCEDURE — 99024 POSTOP FOLLOW-UP VISIT: CPT | Performed by: PODIATRIST

## 2021-07-21 NOTE — CONSULTS
Olive Mitchell MD.  [address]  Vascular and Endovascular Surgery     VASCULAR SURGERY   CONSULT NOTE      Name: Jim Estrada   :   1970  [id number]     2021       REFERRING PHYSICIAN: Valentino Papas, *  PRIMARY CARE Intravenous, Q8H  •  aspirin chewable tab 81 mg, 81 mg, Oral, Daily  •  Clopidogrel Bisulfate (PLAVIX) tab 75 mg, 75 mg, Oral, Daily  •  Insulin Aspart Pen (NOVOLOG) 100 UNIT/ML flexpen 1-7 Units, 1-7 Units, Subcutaneous, TID CC    ALLERGIES:    He has No results for input(s): PTP, INR, PTT in the last 168 hours. No results for input(s): ALT, AST, ALB, AMYLASE, LIPASE, LDH in the last 168 hours. Invalid input(s): ALPHOS, TBIL, DBIL, TPROT  No results for input(s): TROP in the last 168 hours.   No results resection of the distal 1/3 of the 1st metatarsal.  The rest of the findings are stable. SOFT TISSUES:            There is soft tissue swelling. CONCLUSION:  1. Postop changes. Dictated by (CST):  Kim Santana MD on 6/23/2021 at 2:13 PM     F replacing lesion. JOINTS: Degenerative changes are seen throughout the PIP and DIP joints. Degenerative changes seen throughout the talonavicular, navicular-cuneiform and tarsometatarsal joints. PLANTAR FASCIA: Visualized portions are intact.  EFFUSIONS: N forefoot osteoarthritis. Plantar foot myositis without intramuscular abscess. Dorsal foot cellulitis.     Dictated by (CST): Rita Lancaster MD on 7/20/2021 at 11:47 AM     Finalized by (CST): Rita Lancaster MD on 7/20/2021 at 11:58 AM               ASSESSMENT

## 2021-07-21 NOTE — PROGRESS NOTES
[facility] Hospitalist Progress Note     PCP: None Pcp    CC:  Follow up    SUBJECTIVE:  No current issues.  Plan for procedure tomorrow AM    OBJECTIVE:  Temp:  [97.4 °F (36.3 °C)-98.1 °F (36.7 °C)] 98.1 °F (36.7 °C)  Pulse:  [78-88] 80  Resp:  [18] 18  BP: ( **OR** Glucose-Vitamin C **OR** dextrose **OR** glucose **OR** Glucose-Vitamin C, acetaminophen       Assessment/Plan:     Principal Problem:    Cellulitis of right foot  Active Problems:    Acute osteomyelitis of metatarsal bone of right foot (HCC)    Abs

## 2021-07-21 NOTE — PROGRESS NOTES
[facility] - [facility]    PODIATRY PROGRESS NOTE    Brynn Torres Patient Status:  Inpatient    1970 MRN [id number]   Location [facility] Attending Jm Zamora, *   Hosp Day # 1 PCP None Pcp     Date of Admission:  20 (ZOSYN) 3.375 g in dextrose 5% 100 mL IVPB-ADDV, 3.375 g, Intravenous, Q8H  •  aspirin chewable tab 81 mg, 81 mg, Oral, Daily  •  Clopidogrel Bisulfate (PLAVIX) tab 75 mg, 75 mg, Oral, Daily  •  Insulin Aspart Pen (NOVOLOG) 100 UNIT/ML flexpen 1-7 Units, 1 cortical irregularity at the resection margin with small erosions and loss of the normal marrow signal.  Given the amputation was performed 5/2/2021, this is greater than what is expected for postoperative reactive marrow edema.   Findings are suspicious fo shoe     Arterial doppler did demonstrate mild to moderate PAD right LE with tib peroneal occlusive disease, Pt is s/p Right LE angioplasty.     Vascular did see patient today, no further vascular intervention is needed and is optimized for podiatry interv will consider all these options. Patient desires surgical intervention. No guarantees were given or implied. Pt consented freely to surgical intervention. I spent approximately 30 minutes discussing the surgical procedures at length.  I reviewed in detail

## 2021-07-21 NOTE — PLAN OF CARE
Problem: Patient Centered Care  Goal: Patient preferences are identified and integrated in the patient's plan of care  Description: Interventions:  - What would you like us to know as we care for you?  \"to go home\"  - Provide timely, complete, and accur pressure ulcer development  - Assess and document skin integrity  - Assess and document dressing/incision, wound bed, drain sites and surrounding tissue  - Implement wound care per orders  - Initiate isolation precautions as appropriate  - Initiate Pressur Progressing     Problem: DISCHARGE PLANNING  Goal: Discharge to home or other facility with appropriate resources  Description: INTERVENTIONS:  - Identify barriers to discharge w/pt and caregiver  - Include patient/family/discharge partner in discharge devin

## 2021-07-21 NOTE — PROGRESS NOTES
[facility] AND [facility] Infectious Disease  Progress Note    Aure Rodriguez Patient Status:  Inpatient    1970 MRN [id number]   Location [facility]W Attending Henrik Zepeda, *   Hosp Day # 1 PCP None Pcp     Subjective:  Patient se  No osteomyelitis of the 5th metatarsal head.       Mild reactive marrow edema within the 2nd metatarsal head without osteomyelitis.       Extensive cellulitis within the surgical bed of the great toe with a 1.6 cm abscess in the surgical bed. Pavithra barth a pending surgical plans. D/w patient. Will follow. Melissa Beyer [facility] Infectious Disease  [phone]    7/21/2021  10:23 AM

## 2021-07-21 NOTE — PLAN OF CARE
Problem: Patient Centered Care  Goal: Patient preferences are identified and integrated in the patient's plan of care  Description: Interventions:  - What would you like us to know as we care for you?   - Provide timely, complete, and accurate informatio response  - Consider cultural and social influences on pain and pain management  - Manage/alleviate anxiety  - Utilize distraction and/or relaxation techniques  - Monitor for opioid side effects  - Notify MD/LIP if interventions unsuccessful or patient rep discharge planning if the patient needs post-hospital services based on physician/LIP order or complex needs related to functional status, cognitive ability or social support system  Outcome: Progressing     Patient A&Ox4, up in chair for meals.  Encourage

## 2021-07-22 ENCOUNTER — ANESTHESIA EVENT (OUTPATIENT)
Dept: SURGERY | Facility: HOSPITAL | Age: 51
DRG: 475 | End: 2021-07-22

## 2021-07-22 ENCOUNTER — ANESTHESIA (OUTPATIENT)
Dept: SURGERY | Facility: HOSPITAL | Age: 51
DRG: 475 | End: 2021-07-22

## 2021-07-22 LAB
GLUCOSE BLDC GLUCOMTR-MCNC: 133 MG/DL (ref 70–99)
GLUCOSE BLDC GLUCOMTR-MCNC: 142 MG/DL (ref 70–99)
GLUCOSE BLDC GLUCOMTR-MCNC: 229 MG/DL (ref 70–99)
GLUCOSE BLDC GLUCOMTR-MCNC: 255 MG/DL (ref 70–99)
GLUCOSE BLDC GLUCOMTR-MCNC: 263 MG/DL (ref 70–99)
GLUCOSE BLDC GLUCOMTR-MCNC: 297 MG/DL (ref 70–99)

## 2021-07-22 PROCEDURE — 28810 AMPUTATION TOE & METATARSAL: CPT | Performed by: PODIATRIST

## 2021-07-22 PROCEDURE — 0Y6M0Z9 DETACHMENT AT RIGHT FOOT, PARTIAL 1ST RAY, OPEN APPROACH: ICD-10-PCS | Performed by: PODIATRIST

## 2021-07-22 RX ORDER — HYDROMORPHONE HYDROCHLORIDE 1 MG/ML
0.6 INJECTION, SOLUTION INTRAMUSCULAR; INTRAVENOUS; SUBCUTANEOUS EVERY 5 MIN PRN
Status: DISCONTINUED | OUTPATIENT
Start: 2021-07-22 | End: 2021-07-22 | Stop reason: HOSPADM

## 2021-07-22 RX ORDER — DEXTROSE MONOHYDRATE 25 G/50ML
50 INJECTION, SOLUTION INTRAVENOUS
Status: DISCONTINUED | OUTPATIENT
Start: 2021-07-22 | End: 2021-07-22 | Stop reason: HOSPADM

## 2021-07-22 RX ORDER — LIDOCAINE HYDROCHLORIDE 10 MG/ML
INJECTION, SOLUTION EPIDURAL; INFILTRATION; INTRACAUDAL; PERINEURAL AS NEEDED
Status: DISCONTINUED | OUTPATIENT
Start: 2021-07-22 | End: 2021-07-22 | Stop reason: HOSPADM

## 2021-07-22 RX ORDER — ONDANSETRON 2 MG/ML
4 INJECTION INTRAMUSCULAR; INTRAVENOUS ONCE AS NEEDED
Status: DISCONTINUED | OUTPATIENT
Start: 2021-07-22 | End: 2021-07-22 | Stop reason: HOSPADM

## 2021-07-22 RX ORDER — HALOPERIDOL 5 MG/ML
0.25 INJECTION INTRAMUSCULAR ONCE AS NEEDED
Status: DISCONTINUED | OUTPATIENT
Start: 2021-07-22 | End: 2021-07-22 | Stop reason: HOSPADM

## 2021-07-22 RX ORDER — SODIUM CHLORIDE, SODIUM LACTATE, POTASSIUM CHLORIDE, CALCIUM CHLORIDE 600; 310; 30; 20 MG/100ML; MG/100ML; MG/100ML; MG/100ML
INJECTION, SOLUTION INTRAVENOUS CONTINUOUS
Status: DISCONTINUED | OUTPATIENT
Start: 2021-07-22 | End: 2021-07-22 | Stop reason: HOSPADM

## 2021-07-22 RX ORDER — MORPHINE SULFATE 4 MG/ML
4 INJECTION, SOLUTION INTRAMUSCULAR; INTRAVENOUS EVERY 10 MIN PRN
Status: DISCONTINUED | OUTPATIENT
Start: 2021-07-22 | End: 2021-07-22 | Stop reason: HOSPADM

## 2021-07-22 RX ORDER — MORPHINE SULFATE 10 MG/ML
6 INJECTION, SOLUTION INTRAMUSCULAR; INTRAVENOUS EVERY 10 MIN PRN
Status: DISCONTINUED | OUTPATIENT
Start: 2021-07-22 | End: 2021-07-22 | Stop reason: HOSPADM

## 2021-07-22 RX ORDER — HYDROCODONE BITARTRATE AND ACETAMINOPHEN 5; 325 MG/1; MG/1
1 TABLET ORAL AS NEEDED
Status: DISCONTINUED | OUTPATIENT
Start: 2021-07-22 | End: 2021-07-22 | Stop reason: HOSPADM

## 2021-07-22 RX ORDER — MORPHINE SULFATE 4 MG/ML
2 INJECTION, SOLUTION INTRAMUSCULAR; INTRAVENOUS EVERY 10 MIN PRN
Status: DISCONTINUED | OUTPATIENT
Start: 2021-07-22 | End: 2021-07-22 | Stop reason: HOSPADM

## 2021-07-22 RX ORDER — PROCHLORPERAZINE EDISYLATE 5 MG/ML
5 INJECTION INTRAMUSCULAR; INTRAVENOUS ONCE AS NEEDED
Status: DISCONTINUED | OUTPATIENT
Start: 2021-07-22 | End: 2021-07-22 | Stop reason: HOSPADM

## 2021-07-22 RX ORDER — HYDROCODONE BITARTRATE AND ACETAMINOPHEN 5; 325 MG/1; MG/1
2 TABLET ORAL AS NEEDED
Status: DISCONTINUED | OUTPATIENT
Start: 2021-07-22 | End: 2021-07-22 | Stop reason: HOSPADM

## 2021-07-22 RX ORDER — HYDROMORPHONE HYDROCHLORIDE 1 MG/ML
0.4 INJECTION, SOLUTION INTRAMUSCULAR; INTRAVENOUS; SUBCUTANEOUS EVERY 5 MIN PRN
Status: DISCONTINUED | OUTPATIENT
Start: 2021-07-22 | End: 2021-07-22 | Stop reason: HOSPADM

## 2021-07-22 RX ORDER — LIDOCAINE HYDROCHLORIDE 10 MG/ML
INJECTION, SOLUTION EPIDURAL; INFILTRATION; INTRACAUDAL; PERINEURAL AS NEEDED
Status: DISCONTINUED | OUTPATIENT
Start: 2021-07-22 | End: 2021-07-22 | Stop reason: SURG

## 2021-07-22 RX ORDER — ENOXAPARIN SODIUM 100 MG/ML
40 INJECTION SUBCUTANEOUS DAILY
Status: DISCONTINUED | OUTPATIENT
Start: 2021-07-23 | End: 2021-07-23

## 2021-07-22 RX ORDER — NALOXONE HYDROCHLORIDE 0.4 MG/ML
80 INJECTION, SOLUTION INTRAMUSCULAR; INTRAVENOUS; SUBCUTANEOUS AS NEEDED
Status: DISCONTINUED | OUTPATIENT
Start: 2021-07-22 | End: 2021-07-22 | Stop reason: HOSPADM

## 2021-07-22 RX ORDER — MIDAZOLAM HYDROCHLORIDE 1 MG/ML
INJECTION INTRAMUSCULAR; INTRAVENOUS AS NEEDED
Status: DISCONTINUED | OUTPATIENT
Start: 2021-07-22 | End: 2021-07-22 | Stop reason: SURG

## 2021-07-22 RX ORDER — HYDROMORPHONE HYDROCHLORIDE 1 MG/ML
0.2 INJECTION, SOLUTION INTRAMUSCULAR; INTRAVENOUS; SUBCUTANEOUS EVERY 5 MIN PRN
Status: DISCONTINUED | OUTPATIENT
Start: 2021-07-22 | End: 2021-07-22 | Stop reason: HOSPADM

## 2021-07-22 RX ORDER — HYDROCODONE BITARTRATE AND ACETAMINOPHEN 5; 325 MG/1; MG/1
2 TABLET ORAL EVERY 6 HOURS PRN
Status: DISCONTINUED | OUTPATIENT
Start: 2021-07-22 | End: 2021-07-23

## 2021-07-22 RX ORDER — BUPIVACAINE HYDROCHLORIDE 5 MG/ML
INJECTION, SOLUTION EPIDURAL; INTRACAUDAL AS NEEDED
Status: DISCONTINUED | OUTPATIENT
Start: 2021-07-22 | End: 2021-07-22 | Stop reason: HOSPADM

## 2021-07-22 RX ORDER — HYDROCODONE BITARTRATE AND ACETAMINOPHEN 5; 325 MG/1; MG/1
1 TABLET ORAL EVERY 6 HOURS PRN
Status: DISCONTINUED | OUTPATIENT
Start: 2021-07-22 | End: 2021-07-23

## 2021-07-22 RX ADMIN — LIDOCAINE HYDROCHLORIDE 50 MG: 10 INJECTION, SOLUTION EPIDURAL; INFILTRATION; INTRACAUDAL; PERINEURAL at 12:37:00

## 2021-07-22 RX ADMIN — MIDAZOLAM HYDROCHLORIDE 2 MG: 1 INJECTION INTRAMUSCULAR; INTRAVENOUS at 12:34:00

## 2021-07-22 RX ADMIN — SODIUM CHLORIDE: 9 INJECTION, SOLUTION INTRAVENOUS at 12:34:00

## 2021-07-22 RX ADMIN — SODIUM CHLORIDE: 9 INJECTION, SOLUTION INTRAVENOUS at 13:36:00

## 2021-07-22 NOTE — ANESTHESIA PREPROCEDURE EVALUATION
Anesthesia PreOp Note    HPI:     Cesar Linares is a 49year old male who presents for preoperative consultation requested by: Mary Morton DPM    Date of Surgery: 7/19/2021 - 7/22/2021    Procedure(s):  revision partial 1st ray amputation right f 1 tablet (1,000 mg total) by mouth 2 (two) times daily with meals. , Disp: 180 tablet, Rfl: 1, 7/19/2021 at Unknown time  glipiZIDE 5 MG Oral Tab, Take 1 tablet (5 mg total) by mouth daily with breakfast AND 1 tablet (5 mg total) daily with dinner., Disp: 1 chewable tab 8 tablet, 8 tablet, Oral, Q15 Min PRN, Carlos South MD  acetaminophen (TYLENOL) tab 650 mg, 650 mg, Oral, Q6H PRN, Carlos South MD, 650 mg at 07/21/21 1842  atorvastatin (LIPITOR) tab 40 mg, 40 mg, Oral, Nightly, Carlos South MD, 40 mg a of Exercise per Week:       Minutes of Exercise per Session:   Stress:       Feeling of Stress :   Social Connections:       Frequency of Communication with Friends and Family:       Frequency of Social Gatherings with Friends and Family:       Attends Rel Anesthesia Plan:   ASA:  3  Plan:   MAC  Informed Consent Plan and Risks Discussed With:  Patient      I have informed Ar Evans and/or legal guardian or family member of the nature of the anesthetic plan, benefits, risks including possible dental

## 2021-07-22 NOTE — SPIRITUAL CARE NOTE
Pt will be having surgery to cleanse an area where a toe had to be removed due to an infection. Pt acknowledged that he had concerns and was fearful .   provided non-anxious presence, assurance Pt that he was in the hands of a good medical team, pro

## 2021-07-22 NOTE — PROGRESS NOTES
[facility] AND [facility]  [address] Infectious Disease Progress Note    Rejiyle Rising Patient Status:  Inpatient    1970 MRN [id number]   Location [facility] Attending Brook Adame, *   Hosp Day # 2 PCP None Pcp     Subjective:  Pt with no palpitation. Full range of motion to flexion and extension, lateral rotation and lateral flexion of cervical spine. No JVD. Supple. Lungs: Clear to auscultation bilaterally. Cardiac: Regular rate and rhythm. No murmur.   Abdomen:  Soft, non-distended,

## 2021-07-22 NOTE — PLAN OF CARE
Problem: Patient Centered Care  Goal: Patient preferences are identified and integrated in the patient's plan of care  Description: Interventions:  - What would you like us to know as we care for you?  \"I live home alone\"  - Provide timely, complete, an for pressure ulcer development  - Assess and document skin integrity  - Assess and document dressing/incision, wound bed, drain sites and surrounding tissue  - Implement wound care per orders  - Initiate isolation precautions as appropriate  - Initiate Pre Progressing     Problem: DISCHARGE PLANNING  Goal: Discharge to home or other facility with appropriate resources  Description: INTERVENTIONS:  - Identify barriers to discharge w/pt and caregiver  - Include patient/family/discharge partner in discharge devin

## 2021-07-22 NOTE — ANESTHESIA POSTPROCEDURE EVALUATION
Patient: Hallie Bernabe    Procedure Summary     Date: 07/22/21 Room / Location: [address] [place] [address] MAIN OR 03 / [address] [place] [address] MAIN OR    Anesthesia Start: 1234 Anesthesia Stop: 8299    Procedure: revision partial 1st ray amputation right foot (Right Foot) Diagnosis: (osteomyelit

## 2021-07-22 NOTE — OPERATIVE REPORT
OPERATIVE REPORT     Micky Members Patient Status:  Inpatient    1970 MRN [id number]   Location [facility] POST ANESTHESIA CARE UNIT Attending Vladimir Saba, *   Hosp Day # 2 PCP None Pcp     Date of Surgery:  21    Nima Ragland operating table in the supine position. Following IV sedation, local anesthesia was obtained about the right foot utilizing 12 cc's of a 1:1 mixture of 1% lidocaine plain and 0.5% marcaine plain.  The foot was then scrubbed, prepped, and draped in the usua reapproximated coapted utilizing nylon. Quarter inch iodoform packing was subsequently inserted within the wound site.     The incision was dressed with betadine soaked adaptic and covered with sterile compressive dressings consisting of gauze, pashateresal

## 2021-07-22 NOTE — PROGRESS NOTES
[facility] Hospitalist Progress Note     PCP: None Pcp    CC:  Follow up    SUBJECTIVE:  S/p R toe revision amputation. No current issues.  Eating lunch    OBJECTIVE:  Temp:  [97.5 °F (36.4 °C)-98.2 °F (36.8 °C)] 98.1 °F (36.7 °C)  Pulse:  [68-86] 79  Resp: acetaminophen       Assessment/Plan:     Principal Problem:    Cellulitis of right foot  Active Problems:    Acute osteomyelitis of metatarsal bone of right foot (HCC)    Abscess of right foot  50 yo with mmp including but not limited to DMII, PAD, R toe g

## 2021-07-23 ENCOUNTER — TELEPHONE (OUTPATIENT)
Dept: PODIATRY CLINIC | Facility: CLINIC | Age: 51
End: 2021-07-23

## 2021-07-23 ENCOUNTER — TELEPHONE (OUTPATIENT)
Dept: HEMATOLOGY/ONCOLOGY | Facility: HOSPITAL | Age: 51
End: 2021-07-23

## 2021-07-23 ENCOUNTER — APPOINTMENT (OUTPATIENT)
Dept: PICC SERVICES | Facility: HOSPITAL | Age: 51
DRG: 475 | End: 2021-07-23
Attending: INTERNAL MEDICINE

## 2021-07-23 VITALS
HEIGHT: 70.98 IN | RESPIRATION RATE: 18 BRPM | BODY MASS INDEX: 25.03 KG/M2 | OXYGEN SATURATION: 99 % | TEMPERATURE: 99 F | HEART RATE: 89 BPM | SYSTOLIC BLOOD PRESSURE: 112 MMHG | WEIGHT: 178.81 LBS | DIASTOLIC BLOOD PRESSURE: 64 MMHG

## 2021-07-23 LAB
BASOPHILS # BLD AUTO: 0.05 X10(3) UL (ref 0–0.2)
BASOPHILS NFR BLD AUTO: 0.4 %
DEPRECATED RDW RBC AUTO: 42.4 FL (ref 35.1–46.3)
EOSINOPHIL # BLD AUTO: 0.1 X10(3) UL (ref 0–0.7)
EOSINOPHIL NFR BLD AUTO: 0.8 %
ERYTHROCYTE [DISTWIDTH] IN BLOOD BY AUTOMATED COUNT: 13.2 % (ref 11–15)
GLUCOSE BLDC GLUCOMTR-MCNC: 199 MG/DL (ref 70–99)
GLUCOSE BLDC GLUCOMTR-MCNC: 206 MG/DL (ref 70–99)
HCT VFR BLD AUTO: 40.3 %
HGB BLD-MCNC: 13.7 G/DL
IMM GRANULOCYTES # BLD AUTO: 0.03 X10(3) UL (ref 0–1)
IMM GRANULOCYTES NFR BLD: 0.2 %
LYMPHOCYTES # BLD AUTO: 1.89 X10(3) UL (ref 1–4)
LYMPHOCYTES NFR BLD AUTO: 15.3 %
MCH RBC QN AUTO: 29.7 PG (ref 26–34)
MCHC RBC AUTO-ENTMCNC: 34 G/DL (ref 31–37)
MCV RBC AUTO: 87.2 FL
MONOCYTES # BLD AUTO: 1.17 X10(3) UL (ref 0.1–1)
MONOCYTES NFR BLD AUTO: 9.4 %
NEUTROPHILS # BLD AUTO: 9.15 X10 (3) UL (ref 1.5–7.7)
NEUTROPHILS # BLD AUTO: 9.15 X10(3) UL (ref 1.5–7.7)
NEUTROPHILS NFR BLD AUTO: 73.9 %
PLATELET # BLD AUTO: 172 10(3)UL (ref 150–450)
POTASSIUM SERPL-SCNC: 3.9 MMOL/L (ref 3.5–5.1)
RBC # BLD AUTO: 4.62 X10(6)UL
WBC # BLD AUTO: 12.4 X10(3) UL (ref 4–11)

## 2021-07-23 PROCEDURE — 99024 POSTOP FOLLOW-UP VISIT: CPT | Performed by: PODIATRIST

## 2021-07-23 RX ORDER — HYDROCODONE BITARTRATE AND ACETAMINOPHEN 5; 325 MG/1; MG/1
1-2 TABLET ORAL EVERY 6 HOURS PRN
Qty: 14 TABLET | Refills: 0 | Status: SHIPPED | OUTPATIENT
Start: 2021-07-23 | End: 2021-08-04 | Stop reason: ALTCHOICE

## 2021-07-23 RX ORDER — DOCUSATE SODIUM 100 MG/1
100 CAPSULE, LIQUID FILLED ORAL 2 TIMES DAILY PRN
Qty: 20 CAPSULE | Refills: 0 | Status: SHIPPED | OUTPATIENT
Start: 2021-07-23 | End: 2021-07-27 | Stop reason: WASHOUT

## 2021-07-23 RX ORDER — LIDOCAINE HYDROCHLORIDE 10 MG/ML
5 INJECTION, SOLUTION EPIDURAL; INFILTRATION; INTRACAUDAL; PERINEURAL ONCE
Status: COMPLETED | OUTPATIENT
Start: 2021-07-23 | End: 2021-07-23

## 2021-07-23 NOTE — CM/SW NOTE
CM received MDO for 3-6 weeks IV abx on dc. PICC line and orders entered in Epic for [facility]. Pt is stable for DC today. CM spoke with Harold Mcardle [facility] to arranged start of care.    Pt has an appointment scheduled for [phone] @ [facility]

## 2021-07-23 NOTE — PLAN OF CARE
Problem: Patient Centered Care  Goal: Patient preferences are identified and integrated in the patient's plan of care  Description: Interventions:  - What would you like us to know as we care for you? I am ready to go home today.    - Provide timely, comp factors for pressure ulcer development  - Assess and document skin integrity  - Assess and document dressing/incision, wound bed, drain sites and surrounding tissue  - Implement wound care per orders  - Initiate isolation precautions as appropriate  - Init Completed     Problem: DISCHARGE PLANNING  Goal: Discharge to home or other facility with appropriate resources  Description: INTERVENTIONS:  - Identify barriers to discharge w/pt and caregiver  - Include patient/family/discharge partner in discharge plann

## 2021-07-23 NOTE — TELEPHONE ENCOUNTER
Patient is uninsured. Has a possible WC case. Needs assistance with  [employer], Medicaid  Or hospital financial assistance.

## 2021-07-23 NOTE — PROGRESS NOTES
[facility] Hospitalist Progress Note     PCP: None Pcp    CC:  Follow up    SUBJECTIVE:   . No current issues.  Took norco for pain last night    OBJECTIVE:  Temp:  [97.5 °F (36.4 °C)-98.7 °F (37.1 °C)] 98.1 °F (36.7 °C)  Pulse:  [68-92] 92  Resp:  [10-19] 19 ondansetron HCl, glucose **OR** Glucose-Vitamin C **OR** dextrose **OR** glucose **OR** Glucose-Vitamin C, acetaminophen       Assessment/Plan:     Principal Problem:    Cellulitis of right foot  Active Problems:    Acute osteomyelitis of metatarsal bone o

## 2021-07-23 NOTE — PLAN OF CARE
Problem: Patient Centered Care  Goal: Patient preferences are identified and integrated in the patient's plan of care  Description: Interventions:  - What would you like us to know as we care for you?  I brought my own can and shoe from home  - Provide ti document risk factors for pressure ulcer development  - Assess and document skin integrity  - Assess and document dressing/incision, wound bed, drain sites and surrounding tissue  - Implement wound care per orders  - Initiate isolation precautions as appro schedule  Outcome: Progressing     Problem: DISCHARGE PLANNING  Goal: Discharge to home or other facility with appropriate resources  Description: INTERVENTIONS:  - Identify barriers to discharge w/pt and caregiver  - Include patient/family/discharge partn

## 2021-07-23 NOTE — PROGRESS NOTES
[facility]    PODIATRY PROGRESS NOTE    Shahida Garcia Patient Status:  Inpatient    1970 MRN [id number]   Location [facility] Attending Mona Lopes, *   Hosp Day # 3 PCP None Pcp     Date of Admission:  20 mg, 40 mg, Oral, Nightly  •  Piperacillin Sod-Tazobactam So (ZOSYN) 3.375 g in dextrose 5% 100 mL IVPB-ADDV, 3.375 g, Intravenous, Q8H  •  aspirin chewable tab 81 mg, 81 mg, Oral, Daily  •  Clopidogrel Bisulfate (PLAVIX) tab 75 mg, 75 mg, Oral, Daily  •  I is needed and is optimized for podiatry intervention.         ID has ordered 6 weeks of IV abx    Path is pending    OK per podiatry standpoint for discharge, pt to f/u with podiatry in 1-2 weeks for post op care.     Ok to resume anticoagulants    Podiatry

## 2021-07-23 NOTE — PAYOR COMM NOTE
--------------  ADMISSION REVIEW     Payor: [employer]  Subscriber #:  [id number]  Authorization Number: [id number]    Admit date: 7/20/21  Admit time: 12:51 AM     Patient Seen in: [facility] Emergency Department    History   Patient presents with: Oriented. No unusual behavior. Interacting well. Patient with right foot cellulitis after surgery. I have paged his podiatrist Dr. Padma Dougherty, also will page [facility] hospitalist as well as infectious disease specialist Dr. Kehinde Marsh.   I did speak with the residual R great toe OM. Says has been draining yellow fluid. Seen by podiatry and sent for admission. Currently on IV zosyn. When seen, no cp/sob/n/v/f/c. No dysuria, diarrhea.         Diagnostic Data:    CBC/Chem  Recent Labs   Lab 07/19/21  2210 07/20/21 that he may need revision amputations if this does not heal due to his small vessel disease and uncontrolled DM and smoking.          X rays Right foot 3 views 7/12/21:  No acute erosive changes to the distal remaining 1st met, stable.     Cultures from 7/ Karyn Sanchez, RN      bupivacaine PF (MARCAINE) 0.5% injection     Date Action Dose Route User    7/22/2021 1308 Given 6 mL (none) (Right Foot) Michelle Xiao, DPM      fentaNYL citrate (SUBLIMAZE) 0.05 MG/ML injection     Date Action Dose Route User    7/22/ mg Intravenous Queenie Crow CRNA      propofol (DIPRIVAN) infusion     Date Action Dose Route User    7/22/2021 1308 Rate/Dose Change 70 mcg/kg/min × 81.1 kg Intravenous Queenie Crow CRNA    7/22/2021 1244 Rate/Dose Change 80 mcg/kg/min × 81.1 kg I

## 2021-07-23 NOTE — TELEPHONE ENCOUNTER
SW notified by Business Office/Elvi of the pt's uninsured status as the pt is preparing for outpatient daily IV ABX at the [place] [id number]. Chart review completed.  Inpatient CMRN noted 07/20/21 that the pt had a Workman's Compensation claim relat

## 2021-07-23 NOTE — DISCHARGE SUMMARY
General Medicine Discharge Summary     Patient ID:  Veena Welch  49year old  [id number]  2/24/1970    Admit date: 7/19/2021    Discharge date and time:  7/23/21    Attending Physician: La Sánchez, *     Primary Care Physician: None Pcp demonstrated is amputation through the mid diaphysis of the 1st metatarsal.  Surgical margins appear sharp without evidence of erosive change. There is some disuse osteoporosis. No additional evidence of osteomyelitis. SOFT TISSUES: Negative.  No visible series  7, image 13. There is mild marrow edema within the 2nd metatarsal head with preservation of the normal marrow signal on T1 weighted imaging.  Remainder of the osseous structures are otherwise intact without acute fracture, dislocation, or marrow rep with a 1.6 cm abscess in the surgical bed. There is a skin ulceration along the dorsal margin of the great toe. No definite sinus tract is seen communicating with the skin at this point. Correlation with physical exam suggested.   Mild midfoot and forefo Oral Tab  Take 1 tablet (40 mg total) by mouth nightly. Senna 8.6 MG Oral Tab  Take 1 tablet (8.6 mg total) by mouth nightly. Home Medication Changes:      Activity: as directed  Diet: as directed  Wound Care: as directed  Code Status: Full Code Jul 31, 2021 8:30 AM Dk [place] [address]  [phone]   Aug1 Antibiotic  Nhan Aug 1, 2021 8:30 AM Dk [place] [address]  189-

## 2021-07-23 NOTE — PROGRESS NOTES
[facility] AND [facility] Infectious Disease  Progress Note    Russ Anguiano Patient Status:  Inpatient    1970 MRN [id number]   Location [facility]W Attending Guilherme Moran, *   Hosp Day # 3 PCP None Pcp     Subjective:  Patient se shaft.       Subchondral fracture of the 5th metatarsal head with extensive marrow edema.  No osteomyelitis of the 5th metatarsal head.       Mild reactive marrow edema within the 2nd metatarsal head without osteomyelitis.       Extensive cellulitis within given h/o polymicrobial process will favor more broad spectrum coverage. For ease of once daily dosing we can plan on combination invanz + cubicin. Patient needs to f/u with ID in 2 weeks or sooner if needed and we can determine eventual EOT.   Rx entered

## 2021-07-24 ENCOUNTER — TELEPHONE (OUTPATIENT)
Dept: HEMATOLOGY/ONCOLOGY | Facility: HOSPITAL | Age: 51
End: 2021-07-24
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
## 2021-07-25 ENCOUNTER — NURSE ONLY (OUTPATIENT)
Dept: HEMATOLOGY/ONCOLOGY | Facility: HOSPITAL | Age: 51
End: 2021-07-25
Attending: INTERNAL MEDICINE

## 2021-07-25 ENCOUNTER — HOSPITAL ENCOUNTER (EMERGENCY)
Facility: HOSPITAL | Age: 51
Discharge: HOME | End: 2021-07-25
Attending: EMERGENCY MEDICINE

## 2021-07-25 VITALS
BODY MASS INDEX: 24.64 KG/M2 | HEART RATE: 105 BPM | WEIGHT: 176 LBS | SYSTOLIC BLOOD PRESSURE: 118 MMHG | TEMPERATURE: 97 F | DIASTOLIC BLOOD PRESSURE: 71 MMHG | RESPIRATION RATE: 18 BRPM | HEIGHT: 71 IN | OXYGEN SATURATION: 99 %

## 2021-07-25 VITALS
OXYGEN SATURATION: 100 % | DIASTOLIC BLOOD PRESSURE: 76 MMHG | HEART RATE: 98 BPM | SYSTOLIC BLOOD PRESSURE: 130 MMHG | RESPIRATION RATE: 16 BRPM | TEMPERATURE: 98 F

## 2021-07-25 DIAGNOSIS — L03.115 CELLULITIS OF RIGHT LOWER EXTREMITY: ICD-10-CM

## 2021-07-25 DIAGNOSIS — B95.8 STAPH INFECTION: Primary | ICD-10-CM

## 2021-07-25 DIAGNOSIS — M86.171 ACUTE OSTEOMYELITIS OF METATARSAL BONE OF RIGHT FOOT (HCC): ICD-10-CM

## 2021-07-25 DIAGNOSIS — Z51.89 ENCOUNTER FOR WOUND RE-CHECK: Primary | ICD-10-CM

## 2021-07-25 PROCEDURE — 96375 TX/PRO/DX INJ NEW DRUG ADDON: CPT

## 2021-07-25 PROCEDURE — A4216 STERILE WATER/SALINE, 10 ML: HCPCS | Performed by: INTERNAL MEDICINE

## 2021-07-25 PROCEDURE — 96365 THER/PROPH/DIAG IV INF INIT: CPT

## 2021-07-25 PROCEDURE — 99282 EMERGENCY DEPT VISIT SF MDM: CPT

## 2021-07-25 RX ORDER — 0.9 % SODIUM CHLORIDE 0.9 %
INTRAVENOUS SOLUTION INTRAVENOUS
Status: DISCONTINUED
Start: 2021-07-25 | End: 2021-07-25

## 2021-07-25 RX ORDER — ERTAPENEM 1 G/1
INJECTION, POWDER, LYOPHILIZED, FOR SOLUTION INTRAMUSCULAR; INTRAVENOUS
Status: DISCONTINUED
Start: 2021-07-25 | End: 2021-07-25

## 2021-07-25 NOTE — ED QUICK NOTES
Per MD Dillan Lau, who consulted w/ podiatry, and under pt's supervision based on past experience, pt's foot was dressed by MD w/ xeroform gauze over wound, then sponge gauze, then kerlix, then abd pad, then sponge kerlix, then ace wrap.  Pt denies pain or ex

## 2021-07-25 NOTE — ED PROVIDER NOTES
Patient Seen in: [facility] AND [facility] Emergency Department      History   Patient presents with:  Postop/Procedure Problem    Stated Complaint: drainage after right foot surgery    HPI/Subjective:   HPI    49year old male with h/o dm, cad, PAD s/p RLE manny Appearance: He is well-developed. He is not diaphoretic. HENT:      Head: Normocephalic and atraumatic. Eyes:      Conjunctiva/sclera: Conjunctivae normal.      Pupils: Pupils are equal, round, and reactive to light.    Cardiovascular:      Rate and Rhy related injury follow up          Medications Prescribed:  Current Discharge Medication List

## 2021-07-25 NOTE — PROGRESS NOTES
Patient to infusion center for treatment of staph cellulitis of right foot. Patient arrives ambulatory with protective shoe in place on right foot. Denies pain, fever or chills.     Did not make it to yesterday's appointment as ambulance at [facility]'s

## 2021-07-25 NOTE — ED INITIAL ASSESSMENT (HPI)
Patient here reporting L foot bloody drainage s/p toe amputation on Thursday. Denies fevers or pain.

## 2021-07-26 ENCOUNTER — NURSE ONLY (OUTPATIENT)
Dept: HEMATOLOGY/ONCOLOGY | Facility: HOSPITAL | Age: 51
End: 2021-07-26
Attending: INTERNAL MEDICINE

## 2021-07-26 VITALS
RESPIRATION RATE: 16 BRPM | SYSTOLIC BLOOD PRESSURE: 124 MMHG | DIASTOLIC BLOOD PRESSURE: 88 MMHG | HEART RATE: 100 BPM | OXYGEN SATURATION: 100 % | TEMPERATURE: 98 F

## 2021-07-26 DIAGNOSIS — M86.171 ACUTE OSTEOMYELITIS OF METATARSAL BONE OF RIGHT FOOT (HCC): Primary | ICD-10-CM

## 2021-07-26 LAB
ALBUMIN SERPL-MCNC: 3.2 G/DL (ref 3.4–5)
ALBUMIN/GLOB SERPL: 0.7 {RATIO} (ref 1–2)
ALP LIVER SERPL-CCNC: 110 U/L
ALT SERPL-CCNC: 18 U/L
ANION GAP SERPL CALC-SCNC: 5 MMOL/L (ref 0–18)
AST SERPL-CCNC: 11 U/L (ref 15–37)
BASOPHILS # BLD AUTO: 0.05 X10(3) UL (ref 0–0.2)
BASOPHILS NFR BLD AUTO: 0.5 %
BILIRUB SERPL-MCNC: 0.3 MG/DL (ref 0.1–2)
BUN BLD-MCNC: 12 MG/DL (ref 7–18)
BUN/CREAT SERPL: 13.6 (ref 10–20)
CALCIUM BLD-MCNC: 8.6 MG/DL (ref 8.5–10.1)
CHLORIDE SERPL-SCNC: 107 MMOL/L (ref 98–112)
CK SERPL-CCNC: 67 U/L
CO2 SERPL-SCNC: 25 MMOL/L (ref 21–32)
CREAT BLD-MCNC: 0.88 MG/DL
CRP SERPL-MCNC: 5.17 MG/DL (ref ?–0.3)
DEPRECATED RDW RBC AUTO: 41.3 FL (ref 35.1–46.3)
EOSINOPHIL # BLD AUTO: 0.19 X10(3) UL (ref 0–0.7)
EOSINOPHIL NFR BLD AUTO: 1.9 %
ERYTHROCYTE [DISTWIDTH] IN BLOOD BY AUTOMATED COUNT: 13 % (ref 11–15)
GLOBULIN PLAS-MCNC: 4.3 G/DL (ref 2.8–4.4)
GLUCOSE BLD-MCNC: 240 MG/DL (ref 70–99)
HCT VFR BLD AUTO: 35.7 %
HGB BLD-MCNC: 12.1 G/DL
IMM GRANULOCYTES # BLD AUTO: 0.03 X10(3) UL (ref 0–1)
IMM GRANULOCYTES NFR BLD: 0.3 %
LYMPHOCYTES # BLD AUTO: 2.51 X10(3) UL (ref 1–4)
LYMPHOCYTES NFR BLD AUTO: 25.4 %
M PROTEIN MFR SERPL ELPH: 7.5 G/DL (ref 6.4–8.2)
MCH RBC QN AUTO: 29.7 PG (ref 26–34)
MCHC RBC AUTO-ENTMCNC: 33.9 G/DL (ref 31–37)
MCV RBC AUTO: 87.7 FL
MONOCYTES # BLD AUTO: 0.96 X10(3) UL (ref 0.1–1)
MONOCYTES NFR BLD AUTO: 9.7 %
NEUTROPHILS # BLD AUTO: 6.14 X10 (3) UL (ref 1.5–7.7)
NEUTROPHILS # BLD AUTO: 6.14 X10(3) UL (ref 1.5–7.7)
NEUTROPHILS NFR BLD AUTO: 62.2 %
OSMOLALITY SERPL CALC.SUM OF ELEC: 292 MOSM/KG (ref 275–295)
PLATELET # BLD AUTO: 182 10(3)UL (ref 150–450)
POTASSIUM SERPL-SCNC: 3.8 MMOL/L (ref 3.5–5.1)
RBC # BLD AUTO: 4.07 X10(6)UL
SODIUM SERPL-SCNC: 137 MMOL/L (ref 136–145)
WBC # BLD AUTO: 9.9 X10(3) UL (ref 4–11)

## 2021-07-26 PROCEDURE — 82550 ASSAY OF CK (CPK): CPT | Performed by: INTERNAL MEDICINE

## 2021-07-26 PROCEDURE — 96375 TX/PRO/DX INJ NEW DRUG ADDON: CPT

## 2021-07-26 PROCEDURE — 80053 COMPREHEN METABOLIC PANEL: CPT

## 2021-07-26 PROCEDURE — 85025 COMPLETE CBC W/AUTO DIFF WBC: CPT

## 2021-07-26 PROCEDURE — 86140 C-REACTIVE PROTEIN: CPT

## 2021-07-26 PROCEDURE — A4216 STERILE WATER/SALINE, 10 ML: HCPCS | Performed by: INTERNAL MEDICINE

## 2021-07-26 PROCEDURE — 96365 THER/PROPH/DIAG IV INF INIT: CPT

## 2021-07-26 RX ORDER — 0.9 % SODIUM CHLORIDE 0.9 %
INTRAVENOUS SOLUTION INTRAVENOUS
Status: DISCONTINUED
Start: 2021-07-26 | End: 2021-07-26

## 2021-07-26 RX ORDER — ERTAPENEM 1 G/1
INJECTION, POWDER, LYOPHILIZED, FOR SOLUTION INTRAMUSCULAR; INTRAVENOUS
Status: DISCONTINUED
Start: 2021-07-26 | End: 2021-07-26

## 2021-07-26 NOTE — TELEPHONE ENCOUNTER
Called pt to schedule ER F/U per Dr Vinny Hearn. Pt asked to be seen asap. Stated was back in ER last night because foot was still bleeding. Scheduled appt 07/27/21 @ [facility] POD.

## 2021-07-26 NOTE — PROGRESS NOTES
Patient to infusion center for treatment of staph cellulitis of right foot. Orthopedic shoe on, using crutches. Labs including ck level drawn and sent. daptomycin iv push slow followed by invanz.  Gypsy Nieves appeared to tolerate with no s/s of adverse reac

## 2021-07-26 NOTE — PROGRESS NOTES
Pt here daily abx. Appeared to tolerate infusion, no s/s of rxn noted.   Discharged home.         EOT: 8/26/21  F/U ID: pt contacted Dr Judy Toro office for f/u, waiting for call back

## 2021-07-27 ENCOUNTER — PATIENT OUTREACH (OUTPATIENT)
Dept: CASE MANAGEMENT | Age: 51
End: 2021-07-27

## 2021-07-27 ENCOUNTER — NURSE ONLY (OUTPATIENT)
Dept: HEMATOLOGY/ONCOLOGY | Facility: HOSPITAL | Age: 51
End: 2021-07-27
Attending: INTERNAL MEDICINE

## 2021-07-27 ENCOUNTER — OFFICE VISIT (OUTPATIENT)
Dept: PODIATRY CLINIC | Facility: CLINIC | Age: 51
End: 2021-07-27
Payer: OTHER MISCELLANEOUS

## 2021-07-27 VITALS
DIASTOLIC BLOOD PRESSURE: 63 MMHG | HEART RATE: 94 BPM | OXYGEN SATURATION: 100 % | RESPIRATION RATE: 16 BRPM | TEMPERATURE: 98 F | SYSTOLIC BLOOD PRESSURE: 105 MMHG

## 2021-07-27 DIAGNOSIS — Z89.411 HISTORY OF PARTIAL RAY AMPUTATION OF RIGHT GREAT TOE (HCC): Primary | ICD-10-CM

## 2021-07-27 DIAGNOSIS — M86.171 ACUTE OSTEOMYELITIS OF METATARSAL BONE OF RIGHT FOOT (HCC): Primary | ICD-10-CM

## 2021-07-27 DIAGNOSIS — I73.9 PERIPHERAL ARTERIAL DISEASE WITH HISTORY OF REVASCULARIZATION (HCC): ICD-10-CM

## 2021-07-27 DIAGNOSIS — Z98.890 PERIPHERAL ARTERIAL DISEASE WITH HISTORY OF REVASCULARIZATION (HCC): ICD-10-CM

## 2021-07-27 DIAGNOSIS — E11.65 UNCONTROLLED TYPE 2 DIABETES MELLITUS WITH HYPERGLYCEMIA (HCC): ICD-10-CM

## 2021-07-27 PROCEDURE — A4216 STERILE WATER/SALINE, 10 ML: HCPCS | Performed by: INTERNAL MEDICINE

## 2021-07-27 PROCEDURE — 99024 POSTOP FOLLOW-UP VISIT: CPT | Performed by: PODIATRIST

## 2021-07-27 PROCEDURE — 96375 TX/PRO/DX INJ NEW DRUG ADDON: CPT

## 2021-07-27 PROCEDURE — 96365 THER/PROPH/DIAG IV INF INIT: CPT

## 2021-07-27 RX ORDER — 0.9 % SODIUM CHLORIDE 0.9 %
INTRAVENOUS SOLUTION INTRAVENOUS
Status: DISCONTINUED
Start: 2021-07-27 | End: 2021-07-27

## 2021-07-27 RX ORDER — ERTAPENEM 1 G/1
INJECTION, POWDER, LYOPHILIZED, FOR SOLUTION INTRAMUSCULAR; INTRAVENOUS
Status: DISCONTINUED
Start: 2021-07-27 | End: 2021-07-27

## 2021-07-27 NOTE — PROGRESS NOTES
[facility], [facility] Podiatry  Progress Note    Rogelio Shock is a 49year old male.  Patient presents with:  Post-Op: R foot amputation, 4/1- pain scale, denies fever or chills, pt states it was bleeding so went to the ER on Sunday        HPI:     Vianney Kimble vein once for 1 dose.  (Patient not taking: Reported on 7/27/2021 ) 10 mL 0      Past Medical History:   Diagnosis Date   • Amputation of great toe, right, traumatic ([facility] 04/23/2021   • Diabetes ([facility]    • Gangrene of toe of right foot ([facility]    • Heart att 07/26/2021    GFRNAA 100 07/26/2021    CA 8.6 07/26/2021     07/26/2021    K 3.8 07/26/2021     07/26/2021    CO2 25.0 07/26/2021    OSMOCALC 292 07/26/2021        Lab Results   Component Value Date     (H) 07/20/2021    A1C 10.1 (H) 07/

## 2021-07-28 ENCOUNTER — OFFICE VISIT (OUTPATIENT)
Dept: ENDOCRINOLOGY CLINIC | Facility: CLINIC | Age: 51
End: 2021-07-28

## 2021-07-28 ENCOUNTER — NURSE ONLY (OUTPATIENT)
Dept: HEMATOLOGY/ONCOLOGY | Facility: HOSPITAL | Age: 51
End: 2021-07-28
Attending: INTERNAL MEDICINE

## 2021-07-28 VITALS
DIASTOLIC BLOOD PRESSURE: 84 MMHG | HEART RATE: 94 BPM | BODY MASS INDEX: 26 KG/M2 | SYSTOLIC BLOOD PRESSURE: 134 MMHG | WEIGHT: 184.81 LBS

## 2021-07-28 VITALS
RESPIRATION RATE: 16 BRPM | HEART RATE: 99 BPM | SYSTOLIC BLOOD PRESSURE: 119 MMHG | TEMPERATURE: 98 F | DIASTOLIC BLOOD PRESSURE: 74 MMHG | OXYGEN SATURATION: 99 %

## 2021-07-28 DIAGNOSIS — M86.171 ACUTE OSTEOMYELITIS OF METATARSAL BONE OF RIGHT FOOT (HCC): Primary | ICD-10-CM

## 2021-07-28 DIAGNOSIS — E11.65 UNCONTROLLED TYPE 2 DIABETES MELLITUS WITH HYPERGLYCEMIA (HCC): Primary | ICD-10-CM

## 2021-07-28 LAB
GLUCOSE BLOOD: 206
TEST STRIP LOT #: NORMAL NUMERIC

## 2021-07-28 PROCEDURE — 96365 THER/PROPH/DIAG IV INF INIT: CPT

## 2021-07-28 PROCEDURE — A4216 STERILE WATER/SALINE, 10 ML: HCPCS | Performed by: INTERNAL MEDICINE

## 2021-07-28 PROCEDURE — 3075F SYST BP GE 130 - 139MM HG: CPT | Performed by: NURSE PRACTITIONER

## 2021-07-28 PROCEDURE — 82947 ASSAY GLUCOSE BLOOD QUANT: CPT | Performed by: NURSE PRACTITIONER

## 2021-07-28 PROCEDURE — 96375 TX/PRO/DX INJ NEW DRUG ADDON: CPT

## 2021-07-28 PROCEDURE — 3079F DIAST BP 80-89 MM HG: CPT | Performed by: NURSE PRACTITIONER

## 2021-07-28 PROCEDURE — 36416 COLLJ CAPILLARY BLOOD SPEC: CPT | Performed by: NURSE PRACTITIONER

## 2021-07-28 PROCEDURE — 99214 OFFICE O/P EST MOD 30 MIN: CPT | Performed by: NURSE PRACTITIONER

## 2021-07-28 RX ORDER — ERTAPENEM 1 G/1
INJECTION, POWDER, LYOPHILIZED, FOR SOLUTION INTRAMUSCULAR; INTRAVENOUS
Status: DISCONTINUED
Start: 2021-07-28 | End: 2021-07-28

## 2021-07-28 RX ORDER — 0.9 % SODIUM CHLORIDE 0.9 %
INTRAVENOUS SOLUTION INTRAVENOUS
Status: DISCONTINUED
Start: 2021-07-28 | End: 2021-07-28

## 2021-07-28 NOTE — PROGRESS NOTES
Pt here daily abx. Appeared to tolerate infusion, no s/s of rxn noted.   Discharged home.         EOT: 8/26/21  F/U ID: Dr James De Guzman 8/3 @ 850am

## 2021-07-28 NOTE — PROGRESS NOTES
Name: Jessica Rivera  Date: 7/28/2021    CHIEF COMPLAINT   No chief complaint on file. HISTORY OF PRESENT ILLNESS   Jessica Rivera is a 49year old male who presents for follow up on diabetes management.    Hgb A1C: 10.1% on 7/20/2021   Blood glucose toe wound     REVIEW OF SYSTEMS  Constitutional: Negative for: weight change, fever, fatigue, cold/heat intolerance  Eyes: Negative for:  Visual changes, proptosis, blurring  ENT: Negative for:  dysphagia, neck swelling, dysphonia  Respiratory: Negative fo 3  •  Senna 8.6 MG Oral Tab, Take 1 tablet (8.6 mg total) by mouth nightly., Disp: 10 tablet, Rfl: 0  •  acetaminophen 500 MG Oral Tab, Take 500-1,000 mg by mouth every 6 (six) hours as needed for Pain.  , Disp: , Rfl:   •  aspirin 81 MG Oral Chew Tab, Felisa lesions  Hair and nails: normal scalp hair  Hematologic:  no excessive bruising  Neuro: motor grossly intact, moving all extremities without difficulty  Psychiatric:  oriented to time, self, and place  Extremities: no obvious extremity swelling, no lesions were discussed with the patient today. questions were also answered to the best of my knowledge. Patient verbalizes understanding of these issues and agrees to the plan.     7/28/2021  DAVID Matos

## 2021-07-28 NOTE — PATIENT INSTRUCTIONS
A1C: 10.1% on 7/20/2021  Blood glucose: 206 in clinic today    Medications:   Continue with metformin 1,000mg with breakfast      1,000mg with dinner  -Increase Glipizide 5mg -->7.5mg with breakfast      5mg with dinner     -lets try to avoid eating dinner

## 2021-07-29 ENCOUNTER — NURSE ONLY (OUTPATIENT)
Dept: HEMATOLOGY/ONCOLOGY | Facility: HOSPITAL | Age: 51
End: 2021-07-29
Attending: INTERNAL MEDICINE

## 2021-07-29 VITALS
DIASTOLIC BLOOD PRESSURE: 61 MMHG | RESPIRATION RATE: 16 BRPM | OXYGEN SATURATION: 100 % | HEART RATE: 97 BPM | TEMPERATURE: 98 F | SYSTOLIC BLOOD PRESSURE: 106 MMHG

## 2021-07-29 DIAGNOSIS — M86.171 ACUTE OSTEOMYELITIS OF METATARSAL BONE OF RIGHT FOOT (HCC): Primary | ICD-10-CM

## 2021-07-29 PROCEDURE — 96365 THER/PROPH/DIAG IV INF INIT: CPT

## 2021-07-29 PROCEDURE — 96375 TX/PRO/DX INJ NEW DRUG ADDON: CPT

## 2021-07-29 PROCEDURE — A4216 STERILE WATER/SALINE, 10 ML: HCPCS | Performed by: INTERNAL MEDICINE

## 2021-07-29 RX ORDER — ERTAPENEM 1 G/1
INJECTION, POWDER, LYOPHILIZED, FOR SOLUTION INTRAMUSCULAR; INTRAVENOUS
Status: DISCONTINUED
Start: 2021-07-29 | End: 2021-07-29

## 2021-07-29 RX ORDER — 0.9 % SODIUM CHLORIDE 0.9 %
INTRAVENOUS SOLUTION INTRAVENOUS
Status: DISCONTINUED
Start: 2021-07-29 | End: 2021-07-29

## 2021-07-29 NOTE — PROGRESS NOTES
Patient to infusion center for treatment of staph cellulitis of right foot. Orthopedic shoe on, using crutches. daptomycin iv push slow followed by invanz. Beth Perez appeared to tolerate with no s/s of adverse reaction noted.     ID 8/3 850  Last abx day

## 2021-07-30 ENCOUNTER — NURSE ONLY (OUTPATIENT)
Dept: HEMATOLOGY/ONCOLOGY | Facility: HOSPITAL | Age: 51
End: 2021-07-30
Attending: INTERNAL MEDICINE

## 2021-07-30 VITALS
RESPIRATION RATE: 16 BRPM | TEMPERATURE: 98 F | SYSTOLIC BLOOD PRESSURE: 107 MMHG | DIASTOLIC BLOOD PRESSURE: 69 MMHG | HEART RATE: 87 BPM

## 2021-07-30 DIAGNOSIS — M86.171 ACUTE OSTEOMYELITIS OF METATARSAL BONE OF RIGHT FOOT (HCC): Primary | ICD-10-CM

## 2021-07-30 PROCEDURE — 96375 TX/PRO/DX INJ NEW DRUG ADDON: CPT

## 2021-07-30 PROCEDURE — 96365 THER/PROPH/DIAG IV INF INIT: CPT

## 2021-07-30 PROCEDURE — A4216 STERILE WATER/SALINE, 10 ML: HCPCS | Performed by: INTERNAL MEDICINE

## 2021-07-30 RX ORDER — ERTAPENEM 1 G/1
INJECTION, POWDER, LYOPHILIZED, FOR SOLUTION INTRAMUSCULAR; INTRAVENOUS
Status: DISCONTINUED
Start: 2021-07-30 | End: 2021-07-30

## 2021-07-30 RX ORDER — 0.9 % SODIUM CHLORIDE 0.9 %
INTRAVENOUS SOLUTION INTRAVENOUS
Status: DISCONTINUED
Start: 2021-07-30 | End: 2021-07-30

## 2021-07-30 NOTE — PROGRESS NOTES
Patient arrives for daily antibiotics for cellulitis of right foot. Walking boot present to right foot. Reports he is well, denies any complaints. PICC line present to left upper arm, PICC flushed with saline, positive blood return noted.  Dapto given over

## 2021-07-31 ENCOUNTER — NURSE ONLY (OUTPATIENT)
Dept: HEMATOLOGY/ONCOLOGY | Facility: HOSPITAL | Age: 51
End: 2021-07-31
Attending: INTERNAL MEDICINE

## 2021-07-31 VITALS
RESPIRATION RATE: 16 BRPM | OXYGEN SATURATION: 100 % | HEART RATE: 91 BPM | DIASTOLIC BLOOD PRESSURE: 89 MMHG | SYSTOLIC BLOOD PRESSURE: 113 MMHG | TEMPERATURE: 97 F

## 2021-07-31 DIAGNOSIS — M86.171 ACUTE OSTEOMYELITIS OF METATARSAL BONE OF RIGHT FOOT (HCC): Primary | ICD-10-CM

## 2021-07-31 PROCEDURE — 96375 TX/PRO/DX INJ NEW DRUG ADDON: CPT

## 2021-07-31 PROCEDURE — 96365 THER/PROPH/DIAG IV INF INIT: CPT

## 2021-07-31 PROCEDURE — A4216 STERILE WATER/SALINE, 10 ML: HCPCS | Performed by: INTERNAL MEDICINE

## 2021-07-31 RX ORDER — ERTAPENEM 1 G/1
INJECTION, POWDER, LYOPHILIZED, FOR SOLUTION INTRAMUSCULAR; INTRAVENOUS
Status: DISCONTINUED
Start: 2021-07-31 | End: 2021-07-31

## 2021-07-31 RX ORDER — 0.9 % SODIUM CHLORIDE 0.9 %
INTRAVENOUS SOLUTION INTRAVENOUS
Status: DISCONTINUED
Start: 2021-07-31 | End: 2021-07-31

## 2021-07-31 NOTE — PROGRESS NOTES
Jaycob to infusion today for daily antibiotics for osteomyelitis/cellulitis of right foot. Arrives alert and with walking boot present to right foot and with a cane. Reports he is well, denies any complaints. No pain today.  Dry dressing to foot that is jannet

## 2021-08-01 ENCOUNTER — NURSE ONLY (OUTPATIENT)
Dept: HEMATOLOGY/ONCOLOGY | Facility: HOSPITAL | Age: 51
End: 2021-08-01
Attending: INTERNAL MEDICINE

## 2021-08-01 VITALS
SYSTOLIC BLOOD PRESSURE: 118 MMHG | RESPIRATION RATE: 16 BRPM | HEART RATE: 103 BPM | DIASTOLIC BLOOD PRESSURE: 66 MMHG | TEMPERATURE: 97 F | OXYGEN SATURATION: 100 %

## 2021-08-01 DIAGNOSIS — M86.171 ACUTE OSTEOMYELITIS OF METATARSAL BONE OF RIGHT FOOT (HCC): Primary | ICD-10-CM

## 2021-08-01 PROCEDURE — 96365 THER/PROPH/DIAG IV INF INIT: CPT

## 2021-08-01 PROCEDURE — A4216 STERILE WATER/SALINE, 10 ML: HCPCS | Performed by: INTERNAL MEDICINE

## 2021-08-01 PROCEDURE — 96375 TX/PRO/DX INJ NEW DRUG ADDON: CPT

## 2021-08-01 RX ORDER — 0.9 % SODIUM CHLORIDE 0.9 %
INTRAVENOUS SOLUTION INTRAVENOUS
Status: DISPENSED
Start: 2021-08-01 | End: 2021-08-01

## 2021-08-01 RX ORDER — ERTAPENEM 1 G/1
INJECTION, POWDER, LYOPHILIZED, FOR SOLUTION INTRAMUSCULAR; INTRAVENOUS
Status: DISPENSED
Start: 2021-08-01 | End: 2021-08-01

## 2021-08-01 NOTE — PROGRESS NOTES
Jaycob to infusion today for daily antibiotics for osteomyelitis/cellulitis of right foot. Arrives alert and with walking boot present to right foot, ambulating with cane. Reports he is well, denies any complaints. No pain today.  Dressing to foot remains d

## 2021-08-02 ENCOUNTER — NURSE ONLY (OUTPATIENT)
Dept: HEMATOLOGY/ONCOLOGY | Facility: HOSPITAL | Age: 51
End: 2021-08-02
Attending: INTERNAL MEDICINE

## 2021-08-02 VITALS
TEMPERATURE: 99 F | DIASTOLIC BLOOD PRESSURE: 57 MMHG | RESPIRATION RATE: 16 BRPM | HEART RATE: 85 BPM | SYSTOLIC BLOOD PRESSURE: 106 MMHG | OXYGEN SATURATION: 99 %

## 2021-08-02 DIAGNOSIS — M86.171 ACUTE OSTEOMYELITIS OF METATARSAL BONE OF RIGHT FOOT (HCC): Primary | ICD-10-CM

## 2021-08-02 LAB
ALBUMIN SERPL-MCNC: 3.3 G/DL (ref 3.4–5)
ALBUMIN/GLOB SERPL: 0.8 {RATIO} (ref 1–2)
ALP LIVER SERPL-CCNC: 141 U/L
ALT SERPL-CCNC: 29 U/L
ANION GAP SERPL CALC-SCNC: 4 MMOL/L (ref 0–18)
AST SERPL-CCNC: 20 U/L (ref 15–37)
BASOPHILS # BLD AUTO: 0.05 X10(3) UL (ref 0–0.2)
BASOPHILS NFR BLD AUTO: 0.6 %
BILIRUB SERPL-MCNC: 0.2 MG/DL (ref 0.1–2)
BUN BLD-MCNC: 13 MG/DL (ref 7–18)
BUN/CREAT SERPL: 15.3 (ref 10–20)
CALCIUM BLD-MCNC: 8.7 MG/DL (ref 8.5–10.1)
CHLORIDE SERPL-SCNC: 107 MMOL/L (ref 98–112)
CK SERPL-CCNC: 61 U/L
CO2 SERPL-SCNC: 25 MMOL/L (ref 21–32)
CREAT BLD-MCNC: 0.85 MG/DL
CRP SERPL-MCNC: <0.29 MG/DL (ref ?–0.3)
DEPRECATED RDW RBC AUTO: 41.8 FL (ref 35.1–46.3)
EOSINOPHIL # BLD AUTO: 0.15 X10(3) UL (ref 0–0.7)
EOSINOPHIL NFR BLD AUTO: 1.8 %
ERYTHROCYTE [DISTWIDTH] IN BLOOD BY AUTOMATED COUNT: 13 % (ref 11–15)
GLOBULIN PLAS-MCNC: 4.3 G/DL (ref 2.8–4.4)
GLUCOSE BLD-MCNC: 316 MG/DL (ref 70–99)
HCT VFR BLD AUTO: 37.7 %
HGB BLD-MCNC: 12.6 G/DL
IMM GRANULOCYTES # BLD AUTO: 0.03 X10(3) UL (ref 0–1)
IMM GRANULOCYTES NFR BLD: 0.4 %
LYMPHOCYTES # BLD AUTO: 2.54 X10(3) UL (ref 1–4)
LYMPHOCYTES NFR BLD AUTO: 31.3 %
M PROTEIN MFR SERPL ELPH: 7.6 G/DL (ref 6.4–8.2)
MCH RBC QN AUTO: 29.5 PG (ref 26–34)
MCHC RBC AUTO-ENTMCNC: 33.4 G/DL (ref 31–37)
MCV RBC AUTO: 88.3 FL
MONOCYTES # BLD AUTO: 0.66 X10(3) UL (ref 0.1–1)
MONOCYTES NFR BLD AUTO: 8.1 %
NEUTROPHILS # BLD AUTO: 4.69 X10 (3) UL (ref 1.5–7.7)
NEUTROPHILS # BLD AUTO: 4.69 X10(3) UL (ref 1.5–7.7)
NEUTROPHILS NFR BLD AUTO: 57.8 %
OSMOLALITY SERPL CALC.SUM OF ELEC: 294 MOSM/KG (ref 275–295)
PLATELET # BLD AUTO: 222 10(3)UL (ref 150–450)
POTASSIUM SERPL-SCNC: 4.1 MMOL/L (ref 3.5–5.1)
RBC # BLD AUTO: 4.27 X10(6)UL
SODIUM SERPL-SCNC: 136 MMOL/L (ref 136–145)
WBC # BLD AUTO: 8.1 X10(3) UL (ref 4–11)

## 2021-08-02 PROCEDURE — 96375 TX/PRO/DX INJ NEW DRUG ADDON: CPT

## 2021-08-02 PROCEDURE — A4216 STERILE WATER/SALINE, 10 ML: HCPCS | Performed by: INTERNAL MEDICINE

## 2021-08-02 PROCEDURE — 86140 C-REACTIVE PROTEIN: CPT

## 2021-08-02 PROCEDURE — 85025 COMPLETE CBC W/AUTO DIFF WBC: CPT

## 2021-08-02 PROCEDURE — 96365 THER/PROPH/DIAG IV INF INIT: CPT

## 2021-08-02 PROCEDURE — 82550 ASSAY OF CK (CPK): CPT | Performed by: INTERNAL MEDICINE

## 2021-08-02 PROCEDURE — 80053 COMPREHEN METABOLIC PANEL: CPT

## 2021-08-02 RX ORDER — 0.9 % SODIUM CHLORIDE 0.9 %
INTRAVENOUS SOLUTION INTRAVENOUS
Status: DISCONTINUED
Start: 2021-08-02 | End: 2021-08-02

## 2021-08-02 RX ORDER — ERTAPENEM 1 G/1
INJECTION, POWDER, LYOPHILIZED, FOR SOLUTION INTRAMUSCULAR; INTRAVENOUS
Status: DISCONTINUED
Start: 2021-08-02 | End: 2021-08-02

## 2021-08-02 NOTE — PROGRESS NOTES
Pt to infusion for daily Invanz + dapto to treat right foot gangrenous diabetic wound. Surgical shoe to right foot; using cane    Labs drawn and sent, invanz infused with no s/s of adverse reaction noted.  Most current ck level appropriate  Does state he ge

## 2021-08-03 ENCOUNTER — NURSE ONLY (OUTPATIENT)
Dept: HEMATOLOGY/ONCOLOGY | Facility: HOSPITAL | Age: 51
End: 2021-08-03
Attending: INTERNAL MEDICINE

## 2021-08-03 VITALS
OXYGEN SATURATION: 100 % | TEMPERATURE: 98 F | RESPIRATION RATE: 16 BRPM | HEART RATE: 88 BPM | SYSTOLIC BLOOD PRESSURE: 110 MMHG | DIASTOLIC BLOOD PRESSURE: 64 MMHG

## 2021-08-03 DIAGNOSIS — M86.171 ACUTE OSTEOMYELITIS OF METATARSAL BONE OF RIGHT FOOT (HCC): Primary | ICD-10-CM

## 2021-08-03 PROCEDURE — 96365 THER/PROPH/DIAG IV INF INIT: CPT

## 2021-08-03 PROCEDURE — A4216 STERILE WATER/SALINE, 10 ML: HCPCS | Performed by: INTERNAL MEDICINE

## 2021-08-03 PROCEDURE — 96375 TX/PRO/DX INJ NEW DRUG ADDON: CPT

## 2021-08-03 RX ORDER — ERTAPENEM 1 G/1
INJECTION, POWDER, LYOPHILIZED, FOR SOLUTION INTRAMUSCULAR; INTRAVENOUS
Status: DISCONTINUED
Start: 2021-08-03 | End: 2021-08-03

## 2021-08-03 RX ORDER — 0.9 % SODIUM CHLORIDE 0.9 %
INTRAVENOUS SOLUTION INTRAVENOUS
Status: DISCONTINUED
Start: 2021-08-03 | End: 2021-08-03

## 2021-08-03 NOTE — PROGRESS NOTES
Pt to infusion for daily Invanz + dapto to treat right foot gangrenous diabetic wound. Surgical shoe to right foot; using cane    Patient with no complaints today. Feeling well. Patient will see surgeon tomorrow.   Daptomycin given IVP followed by Raul Dias

## 2021-08-04 ENCOUNTER — OFFICE VISIT (OUTPATIENT)
Dept: PODIATRY CLINIC | Facility: CLINIC | Age: 51
End: 2021-08-04
Payer: OTHER MISCELLANEOUS

## 2021-08-04 ENCOUNTER — TELEPHONE (OUTPATIENT)
Dept: PODIATRY CLINIC | Facility: CLINIC | Age: 51
End: 2021-08-04

## 2021-08-04 ENCOUNTER — NURSE ONLY (OUTPATIENT)
Dept: HEMATOLOGY/ONCOLOGY | Facility: HOSPITAL | Age: 51
End: 2021-08-04
Attending: INTERNAL MEDICINE

## 2021-08-04 VITALS
HEART RATE: 92 BPM | RESPIRATION RATE: 16 BRPM | SYSTOLIC BLOOD PRESSURE: 106 MMHG | OXYGEN SATURATION: 99 % | DIASTOLIC BLOOD PRESSURE: 61 MMHG | TEMPERATURE: 98 F

## 2021-08-04 DIAGNOSIS — M79.671 ISCHEMIC PAIN OF FOOT, RIGHT: ICD-10-CM

## 2021-08-04 DIAGNOSIS — I99.8 ISCHEMIC PAIN OF FOOT, RIGHT: ICD-10-CM

## 2021-08-04 DIAGNOSIS — Z98.890 PERIPHERAL ARTERIAL DISEASE WITH HISTORY OF REVASCULARIZATION (HCC): ICD-10-CM

## 2021-08-04 DIAGNOSIS — M86.171 ACUTE OSTEOMYELITIS OF METATARSAL BONE OF RIGHT FOOT (HCC): Primary | ICD-10-CM

## 2021-08-04 DIAGNOSIS — I73.9 PERIPHERAL ARTERIAL DISEASE WITH HISTORY OF REVASCULARIZATION (HCC): ICD-10-CM

## 2021-08-04 DIAGNOSIS — E11.65 UNCONTROLLED TYPE 2 DIABETES MELLITUS WITH HYPERGLYCEMIA (HCC): ICD-10-CM

## 2021-08-04 DIAGNOSIS — Z89.411 HISTORY OF PARTIAL RAY AMPUTATION OF RIGHT GREAT TOE (HCC): Primary | ICD-10-CM

## 2021-08-04 PROCEDURE — A4216 STERILE WATER/SALINE, 10 ML: HCPCS | Performed by: INTERNAL MEDICINE

## 2021-08-04 PROCEDURE — 96375 TX/PRO/DX INJ NEW DRUG ADDON: CPT

## 2021-08-04 PROCEDURE — 99024 POSTOP FOLLOW-UP VISIT: CPT | Performed by: PODIATRIST

## 2021-08-04 PROCEDURE — 96365 THER/PROPH/DIAG IV INF INIT: CPT

## 2021-08-04 RX ORDER — ERTAPENEM 1 G/1
INJECTION, POWDER, LYOPHILIZED, FOR SOLUTION INTRAMUSCULAR; INTRAVENOUS
Status: DISCONTINUED
Start: 2021-08-04 | End: 2021-08-04

## 2021-08-04 RX ORDER — 0.9 % SODIUM CHLORIDE 0.9 %
INTRAVENOUS SOLUTION INTRAVENOUS
Status: DISCONTINUED
Start: 2021-08-04 | End: 2021-08-04

## 2021-08-04 RX ORDER — HYDROCODONE BITARTRATE AND ACETAMINOPHEN 5; 325 MG/1; MG/1
1 TABLET ORAL NIGHTLY PRN
Qty: 7 TABLET | Refills: 0 | Status: SHIPPED | OUTPATIENT
Start: 2021-08-04 | End: 2021-08-11

## 2021-08-04 NOTE — PROGRESS NOTES
Pt to infusion for daily Invanz + dapto to treat right foot nonhealing diabetic wound. at toe amputation site. Surgical shoe to right foot; using cane. Denies any pain. He did see the podiatrist today and states the wound is improving.   Daptomycin and inva

## 2021-08-04 NOTE — PROGRESS NOTES
[facility] Podiatry  Progress Note    Narda Babinski is a 49year old male. Patient presents with: Follow - Up: R great toe amputation, denies any pain at the moment        HPI:     Narda Babinski is a a(n) 49year old male.  This is a pleasant poor Past Surgical History:   Procedure Laterality Date   • OTHER SURGICAL HISTORY Right 04/2021    hallux amputation      Family History   Problem Relation Age of Onset   • Cancer Mother       Social History    Socioeconomic History      Marital status: S 10.1 (H) 07/20/2021        No results found.      ASSESSMENT AND PLAN:   Diagnoses and all orders for this visit:    History of partial ray amputation of right great toe ([facility]    Peripheral [facility]    Uncontr

## 2021-08-05 ENCOUNTER — NURSE ONLY (OUTPATIENT)
Dept: HEMATOLOGY/ONCOLOGY | Facility: HOSPITAL | Age: 51
End: 2021-08-05
Attending: INTERNAL MEDICINE

## 2021-08-05 VITALS
TEMPERATURE: 98 F | OXYGEN SATURATION: 100 % | RESPIRATION RATE: 16 BRPM | DIASTOLIC BLOOD PRESSURE: 69 MMHG | HEART RATE: 90 BPM | SYSTOLIC BLOOD PRESSURE: 128 MMHG

## 2021-08-05 DIAGNOSIS — M86.171 ACUTE OSTEOMYELITIS OF METATARSAL BONE OF RIGHT FOOT (HCC): Primary | ICD-10-CM

## 2021-08-05 PROCEDURE — 96375 TX/PRO/DX INJ NEW DRUG ADDON: CPT

## 2021-08-05 PROCEDURE — 96365 THER/PROPH/DIAG IV INF INIT: CPT

## 2021-08-05 PROCEDURE — A4216 STERILE WATER/SALINE, 10 ML: HCPCS | Performed by: INTERNAL MEDICINE

## 2021-08-05 RX ORDER — ERTAPENEM 1 G/1
INJECTION, POWDER, LYOPHILIZED, FOR SOLUTION INTRAMUSCULAR; INTRAVENOUS
Status: DISCONTINUED
Start: 2021-08-05 | End: 2021-08-05

## 2021-08-05 RX ORDER — 0.9 % SODIUM CHLORIDE 0.9 %
INTRAVENOUS SOLUTION INTRAVENOUS
Status: DISCONTINUED
Start: 2021-08-05 | End: 2021-08-05

## 2021-08-05 NOTE — PROGRESS NOTES
Pt to infusion for daily Invanz + dapto to treat right foot osteomyelitis. Surgical shoe to right foot; using cane to assist with ambulation. States some pain to foot. Norco PRN, but pt trying to \"hold off\" on Norco for past couple days.  Daptomycin a

## 2021-08-06 ENCOUNTER — NURSE ONLY (OUTPATIENT)
Dept: HEMATOLOGY/ONCOLOGY | Facility: HOSPITAL | Age: 51
End: 2021-08-06
Attending: INTERNAL MEDICINE

## 2021-08-06 VITALS
DIASTOLIC BLOOD PRESSURE: 56 MMHG | SYSTOLIC BLOOD PRESSURE: 99 MMHG | RESPIRATION RATE: 16 BRPM | OXYGEN SATURATION: 99 % | TEMPERATURE: 98 F | HEART RATE: 79 BPM

## 2021-08-06 DIAGNOSIS — M86.171 ACUTE OSTEOMYELITIS OF METATARSAL BONE OF RIGHT FOOT (HCC): Primary | ICD-10-CM

## 2021-08-06 PROCEDURE — 96365 THER/PROPH/DIAG IV INF INIT: CPT

## 2021-08-06 PROCEDURE — 96375 TX/PRO/DX INJ NEW DRUG ADDON: CPT

## 2021-08-06 PROCEDURE — A4216 STERILE WATER/SALINE, 10 ML: HCPCS | Performed by: INTERNAL MEDICINE

## 2021-08-06 RX ORDER — ERTAPENEM 1 G/1
INJECTION, POWDER, LYOPHILIZED, FOR SOLUTION INTRAMUSCULAR; INTRAVENOUS
Status: DISCONTINUED
Start: 2021-08-06 | End: 2021-08-06

## 2021-08-06 RX ORDER — 0.9 % SODIUM CHLORIDE 0.9 %
INTRAVENOUS SOLUTION INTRAVENOUS
Status: DISCONTINUED
Start: 2021-08-06 | End: 2021-08-06

## 2021-08-07 ENCOUNTER — NURSE ONLY (OUTPATIENT)
Dept: HEMATOLOGY/ONCOLOGY | Facility: HOSPITAL | Age: 51
End: 2021-08-07
Attending: INTERNAL MEDICINE

## 2021-08-07 VITALS
HEART RATE: 91 BPM | DIASTOLIC BLOOD PRESSURE: 69 MMHG | SYSTOLIC BLOOD PRESSURE: 123 MMHG | TEMPERATURE: 98 F | OXYGEN SATURATION: 100 % | RESPIRATION RATE: 16 BRPM

## 2021-08-07 DIAGNOSIS — M86.171 ACUTE OSTEOMYELITIS OF METATARSAL BONE OF RIGHT FOOT (HCC): Primary | ICD-10-CM

## 2021-08-07 PROCEDURE — 96365 THER/PROPH/DIAG IV INF INIT: CPT

## 2021-08-07 PROCEDURE — A4216 STERILE WATER/SALINE, 10 ML: HCPCS | Performed by: INTERNAL MEDICINE

## 2021-08-07 PROCEDURE — 96375 TX/PRO/DX INJ NEW DRUG ADDON: CPT

## 2021-08-07 RX ORDER — ERTAPENEM 1 G/1
INJECTION, POWDER, LYOPHILIZED, FOR SOLUTION INTRAMUSCULAR; INTRAVENOUS
Status: DISCONTINUED
Start: 2021-08-07 | End: 2021-08-07

## 2021-08-07 RX ORDER — 0.9 % SODIUM CHLORIDE 0.9 %
INTRAVENOUS SOLUTION INTRAVENOUS
Status: DISCONTINUED
Start: 2021-08-07 | End: 2021-08-07

## 2021-08-08 ENCOUNTER — NURSE ONLY (OUTPATIENT)
Dept: HEMATOLOGY/ONCOLOGY | Facility: HOSPITAL | Age: 51
End: 2021-08-08
Attending: INTERNAL MEDICINE

## 2021-08-08 VITALS
RESPIRATION RATE: 16 BRPM | TEMPERATURE: 98 F | SYSTOLIC BLOOD PRESSURE: 122 MMHG | HEART RATE: 81 BPM | OXYGEN SATURATION: 99 % | DIASTOLIC BLOOD PRESSURE: 68 MMHG

## 2021-08-08 DIAGNOSIS — M86.171 ACUTE OSTEOMYELITIS OF METATARSAL BONE OF RIGHT FOOT (HCC): Primary | ICD-10-CM

## 2021-08-08 PROCEDURE — 96365 THER/PROPH/DIAG IV INF INIT: CPT

## 2021-08-08 PROCEDURE — 96375 TX/PRO/DX INJ NEW DRUG ADDON: CPT

## 2021-08-08 PROCEDURE — A4216 STERILE WATER/SALINE, 10 ML: HCPCS | Performed by: INTERNAL MEDICINE

## 2021-08-08 RX ORDER — 0.9 % SODIUM CHLORIDE 0.9 %
INTRAVENOUS SOLUTION INTRAVENOUS
Status: DISCONTINUED
Start: 2021-08-08 | End: 2021-08-08

## 2021-08-08 RX ORDER — ERTAPENEM 1 G/1
INJECTION, POWDER, LYOPHILIZED, FOR SOLUTION INTRAMUSCULAR; INTRAVENOUS
Status: DISCONTINUED
Start: 2021-08-08 | End: 2021-08-08

## 2021-08-08 NOTE — PROGRESS NOTES
Patient arrives for daily antibiotics for cellulitis of right foot. Walking boot present to right foot using cane for balance. Reports he is well, denies any complaints.  PICC line present to left upper arm, PICC flushed with saline, positive blood return

## 2021-08-09 ENCOUNTER — NURSE ONLY (OUTPATIENT)
Dept: HEMATOLOGY/ONCOLOGY | Facility: HOSPITAL | Age: 51
End: 2021-08-09
Attending: INTERNAL MEDICINE

## 2021-08-09 VITALS
OXYGEN SATURATION: 100 % | SYSTOLIC BLOOD PRESSURE: 103 MMHG | DIASTOLIC BLOOD PRESSURE: 59 MMHG | RESPIRATION RATE: 16 BRPM | HEART RATE: 87 BPM | TEMPERATURE: 98 F

## 2021-08-09 DIAGNOSIS — M86.171 ACUTE OSTEOMYELITIS OF METATARSAL BONE OF RIGHT FOOT (HCC): Primary | ICD-10-CM

## 2021-08-09 LAB
ALBUMIN SERPL-MCNC: 3.2 G/DL (ref 3.4–5)
ALBUMIN/GLOB SERPL: 0.7 {RATIO} (ref 1–2)
ALP LIVER SERPL-CCNC: 138 U/L
ALT SERPL-CCNC: 27 U/L
ANION GAP SERPL CALC-SCNC: 5 MMOL/L (ref 0–18)
AST SERPL-CCNC: 18 U/L (ref 15–37)
BASOPHILS # BLD AUTO: 0.05 X10(3) UL (ref 0–0.2)
BASOPHILS NFR BLD AUTO: 0.5 %
BILIRUB SERPL-MCNC: 0.2 MG/DL (ref 0.1–2)
BUN BLD-MCNC: 11 MG/DL (ref 7–18)
BUN/CREAT SERPL: 15.7 (ref 10–20)
CALCIUM BLD-MCNC: 8.1 MG/DL (ref 8.5–10.1)
CHLORIDE SERPL-SCNC: 105 MMOL/L (ref 98–112)
CK SERPL-CCNC: 70 U/L
CO2 SERPL-SCNC: 27 MMOL/L (ref 21–32)
CREAT BLD-MCNC: 0.7 MG/DL
CRP SERPL-MCNC: <0.29 MG/DL (ref ?–0.3)
DEPRECATED RDW RBC AUTO: 42.1 FL (ref 35.1–46.3)
EOSINOPHIL # BLD AUTO: 0.18 X10(3) UL (ref 0–0.7)
EOSINOPHIL NFR BLD AUTO: 1.9 %
ERYTHROCYTE [DISTWIDTH] IN BLOOD BY AUTOMATED COUNT: 13.3 % (ref 11–15)
GLOBULIN PLAS-MCNC: 4.5 G/DL (ref 2.8–4.4)
GLUCOSE BLD-MCNC: 237 MG/DL (ref 70–99)
HCT VFR BLD AUTO: 38.8 %
HGB BLD-MCNC: 13.2 G/DL
IMM GRANULOCYTES # BLD AUTO: 0.05 X10(3) UL (ref 0–1)
IMM GRANULOCYTES NFR BLD: 0.5 %
LYMPHOCYTES # BLD AUTO: 3.02 X10(3) UL (ref 1–4)
LYMPHOCYTES NFR BLD AUTO: 31.1 %
M PROTEIN MFR SERPL ELPH: 7.7 G/DL (ref 6.4–8.2)
MCH RBC QN AUTO: 29.5 PG (ref 26–34)
MCHC RBC AUTO-ENTMCNC: 34 G/DL (ref 31–37)
MCV RBC AUTO: 86.6 FL
MONOCYTES # BLD AUTO: 0.72 X10(3) UL (ref 0.1–1)
MONOCYTES NFR BLD AUTO: 7.4 %
NEUTROPHILS # BLD AUTO: 5.7 X10 (3) UL (ref 1.5–7.7)
NEUTROPHILS # BLD AUTO: 5.7 X10(3) UL (ref 1.5–7.7)
NEUTROPHILS NFR BLD AUTO: 58.6 %
OSMOLALITY SERPL CALC.SUM OF ELEC: 291 MOSM/KG (ref 275–295)
PLATELET # BLD AUTO: 210 10(3)UL (ref 150–450)
POTASSIUM SERPL-SCNC: 4 MMOL/L (ref 3.5–5.1)
RBC # BLD AUTO: 4.48 X10(6)UL
SODIUM SERPL-SCNC: 137 MMOL/L (ref 136–145)
WBC # BLD AUTO: 9.7 X10(3) UL (ref 4–11)

## 2021-08-09 PROCEDURE — 85025 COMPLETE CBC W/AUTO DIFF WBC: CPT

## 2021-08-09 PROCEDURE — 82550 ASSAY OF CK (CPK): CPT | Performed by: INTERNAL MEDICINE

## 2021-08-09 PROCEDURE — 80053 COMPREHEN METABOLIC PANEL: CPT

## 2021-08-09 PROCEDURE — A4216 STERILE WATER/SALINE, 10 ML: HCPCS | Performed by: INTERNAL MEDICINE

## 2021-08-09 PROCEDURE — 96375 TX/PRO/DX INJ NEW DRUG ADDON: CPT

## 2021-08-09 PROCEDURE — 96365 THER/PROPH/DIAG IV INF INIT: CPT

## 2021-08-09 PROCEDURE — 86140 C-REACTIVE PROTEIN: CPT

## 2021-08-09 RX ORDER — 0.9 % SODIUM CHLORIDE 0.9 %
INTRAVENOUS SOLUTION INTRAVENOUS
Status: DISCONTINUED
Start: 2021-08-09 | End: 2021-08-09

## 2021-08-09 RX ORDER — ERTAPENEM 1 G/1
INJECTION, POWDER, LYOPHILIZED, FOR SOLUTION INTRAMUSCULAR; INTRAVENOUS
Status: DISCONTINUED
Start: 2021-08-09 | End: 2021-08-09

## 2021-08-09 NOTE — PROGRESS NOTES
Pt to infusion for daily Invanz + dapto to treat right foot gangrenous diabetic wound. Surgical shoe to right foot; using cane.  Having pain to foot area at times, has appointment this week with his podiatrist; this  nurse encouraged Paul to contact prior

## 2021-08-10 ENCOUNTER — NURSE ONLY (OUTPATIENT)
Dept: HEMATOLOGY/ONCOLOGY | Facility: HOSPITAL | Age: 51
End: 2021-08-10
Attending: INTERNAL MEDICINE

## 2021-08-10 VITALS
SYSTOLIC BLOOD PRESSURE: 125 MMHG | RESPIRATION RATE: 16 BRPM | DIASTOLIC BLOOD PRESSURE: 67 MMHG | HEART RATE: 92 BPM | OXYGEN SATURATION: 99 % | TEMPERATURE: 98 F

## 2021-08-10 DIAGNOSIS — M86.171 ACUTE OSTEOMYELITIS OF METATARSAL BONE OF RIGHT FOOT (HCC): Primary | ICD-10-CM

## 2021-08-10 PROCEDURE — 96375 TX/PRO/DX INJ NEW DRUG ADDON: CPT

## 2021-08-10 PROCEDURE — A4216 STERILE WATER/SALINE, 10 ML: HCPCS | Performed by: INTERNAL MEDICINE

## 2021-08-10 PROCEDURE — 96365 THER/PROPH/DIAG IV INF INIT: CPT

## 2021-08-10 RX ORDER — 0.9 % SODIUM CHLORIDE 0.9 %
INTRAVENOUS SOLUTION INTRAVENOUS
Status: DISCONTINUED
Start: 2021-08-10 | End: 2021-08-10

## 2021-08-10 RX ORDER — ERTAPENEM 1 G/1
INJECTION, POWDER, LYOPHILIZED, FOR SOLUTION INTRAMUSCULAR; INTRAVENOUS
Status: DISCONTINUED
Start: 2021-08-10 | End: 2021-08-10

## 2021-08-10 NOTE — PROGRESS NOTES
Pt to infusion for daily Invanz + dapto to treat right foot gangrenous diabetic wound. Surgical shoe to right foot; using cane.  Having pain to foot area at times, has appointment tomorrow  with his podiatrist; this  nurse encouraged Yonatan Stewart to contact prior

## 2021-08-11 ENCOUNTER — OFFICE VISIT (OUTPATIENT)
Dept: PODIATRY CLINIC | Facility: CLINIC | Age: 51
End: 2021-08-11
Payer: OTHER MISCELLANEOUS

## 2021-08-11 ENCOUNTER — NURSE ONLY (OUTPATIENT)
Dept: HEMATOLOGY/ONCOLOGY | Facility: HOSPITAL | Age: 51
End: 2021-08-11
Attending: INTERNAL MEDICINE

## 2021-08-11 VITALS
RESPIRATION RATE: 16 BRPM | TEMPERATURE: 98 F | DIASTOLIC BLOOD PRESSURE: 77 MMHG | HEART RATE: 99 BPM | OXYGEN SATURATION: 100 % | SYSTOLIC BLOOD PRESSURE: 130 MMHG

## 2021-08-11 DIAGNOSIS — M86.171 ACUTE OSTEOMYELITIS OF METATARSAL BONE OF RIGHT FOOT (HCC): Primary | ICD-10-CM

## 2021-08-11 DIAGNOSIS — E11.65 UNCONTROLLED TYPE 2 DIABETES MELLITUS WITH HYPERGLYCEMIA (HCC): ICD-10-CM

## 2021-08-11 DIAGNOSIS — I99.8 ISCHEMIC PAIN OF FOOT, RIGHT: ICD-10-CM

## 2021-08-11 DIAGNOSIS — Z89.411 HISTORY OF PARTIAL RAY AMPUTATION OF RIGHT GREAT TOE (HCC): Primary | ICD-10-CM

## 2021-08-11 DIAGNOSIS — M79.671 ISCHEMIC PAIN OF FOOT, RIGHT: ICD-10-CM

## 2021-08-11 DIAGNOSIS — I73.9 PERIPHERAL ARTERIAL DISEASE WITH HISTORY OF REVASCULARIZATION (HCC): ICD-10-CM

## 2021-08-11 DIAGNOSIS — Z98.890 PERIPHERAL ARTERIAL DISEASE WITH HISTORY OF REVASCULARIZATION (HCC): ICD-10-CM

## 2021-08-11 PROCEDURE — 96375 TX/PRO/DX INJ NEW DRUG ADDON: CPT

## 2021-08-11 PROCEDURE — 99024 POSTOP FOLLOW-UP VISIT: CPT | Performed by: PODIATRIST

## 2021-08-11 PROCEDURE — A4216 STERILE WATER/SALINE, 10 ML: HCPCS | Performed by: INTERNAL MEDICINE

## 2021-08-11 PROCEDURE — 96365 THER/PROPH/DIAG IV INF INIT: CPT

## 2021-08-11 RX ORDER — ERTAPENEM 1 G/1
INJECTION, POWDER, LYOPHILIZED, FOR SOLUTION INTRAMUSCULAR; INTRAVENOUS
Status: DISCONTINUED
Start: 2021-08-11 | End: 2021-08-11

## 2021-08-11 RX ORDER — 0.9 % SODIUM CHLORIDE 0.9 %
INTRAVENOUS SOLUTION INTRAVENOUS
Status: DISCONTINUED
Start: 2021-08-11 | End: 2021-08-11

## 2021-08-11 NOTE — PROGRESS NOTES
Pt here for daily Invanz + dapto. Appeared to tolerate infusion, no s/s of adverse reaction noted. Discharged home ambulating independently.       EOT:8/17  F/U ID Dr. Raheem Pace morning of 8/17

## 2021-08-11 NOTE — PROGRESS NOTES
[facility], [facility] Podiatry  Progress Note    Lorelei Espinoza is a 49year old male.  Patient presents with:  Post-Op: Pt here for post op, reports increased pain and feels \"wet\", reports throbbing of foot, pain 7/10        HPI:     Lorelei Espinoza is a a(n) right, traumatic ([facility] 04/23/2021   • Diabetes ([facility]    • Gangrene of toe of right foot ([facility]    • Heart attack [facility])       Past Surgical History:   Procedure Laterality Date   • OTHER SURGICAL HISTORY Right 04/2021    hallux amputation      Family History 08/09/2021    CO2 27.0 08/09/2021    OSMOCALC 291 08/09/2021        Lab Results   Component Value Date     (H) 07/20/2021    A1C 10.1 (H) 07/20/2021        No results found.      ASSESSMENT AND PLAN:   Diagnoses and all orders for this visit:    Hist

## 2021-08-12 ENCOUNTER — NURSE ONLY (OUTPATIENT)
Dept: HEMATOLOGY/ONCOLOGY | Facility: HOSPITAL | Age: 51
End: 2021-08-12
Attending: INTERNAL MEDICINE

## 2021-08-12 VITALS
TEMPERATURE: 98 F | SYSTOLIC BLOOD PRESSURE: 118 MMHG | OXYGEN SATURATION: 100 % | RESPIRATION RATE: 16 BRPM | DIASTOLIC BLOOD PRESSURE: 74 MMHG | HEART RATE: 102 BPM

## 2021-08-12 DIAGNOSIS — M86.171 ACUTE OSTEOMYELITIS OF METATARSAL BONE OF RIGHT FOOT (HCC): Primary | ICD-10-CM

## 2021-08-12 PROCEDURE — 96365 THER/PROPH/DIAG IV INF INIT: CPT

## 2021-08-12 PROCEDURE — A4216 STERILE WATER/SALINE, 10 ML: HCPCS | Performed by: INTERNAL MEDICINE

## 2021-08-12 PROCEDURE — 96375 TX/PRO/DX INJ NEW DRUG ADDON: CPT

## 2021-08-12 RX ORDER — ERTAPENEM 1 G/1
INJECTION, POWDER, LYOPHILIZED, FOR SOLUTION INTRAMUSCULAR; INTRAVENOUS
Status: DISCONTINUED
Start: 2021-08-12 | End: 2021-08-12

## 2021-08-12 RX ORDER — 0.9 % SODIUM CHLORIDE 0.9 %
INTRAVENOUS SOLUTION INTRAVENOUS
Status: DISCONTINUED
Start: 2021-08-12 | End: 2021-08-12

## 2021-08-12 NOTE — PROGRESS NOTES
Pt to infusion for daily Invanz + dapto to treat right foot osteomyelitis. Surgical shoe to right foot. States some pain to foot. Norco PRN. Daptomycin and invanz given. Tolerated well. PICC flushed with good blood return.   Saline locked and alcoho

## 2021-08-13 ENCOUNTER — NURSE ONLY (OUTPATIENT)
Dept: HEMATOLOGY/ONCOLOGY | Facility: HOSPITAL | Age: 51
End: 2021-08-13
Attending: INTERNAL MEDICINE

## 2021-08-13 VITALS
DIASTOLIC BLOOD PRESSURE: 64 MMHG | HEART RATE: 97 BPM | OXYGEN SATURATION: 99 % | TEMPERATURE: 98 F | SYSTOLIC BLOOD PRESSURE: 113 MMHG | RESPIRATION RATE: 16 BRPM

## 2021-08-13 DIAGNOSIS — M86.171 ACUTE OSTEOMYELITIS OF METATARSAL BONE OF RIGHT FOOT (HCC): Primary | ICD-10-CM

## 2021-08-13 PROCEDURE — 96374 THER/PROPH/DIAG INJ IV PUSH: CPT

## 2021-08-13 PROCEDURE — 96365 THER/PROPH/DIAG IV INF INIT: CPT

## 2021-08-13 PROCEDURE — 96375 TX/PRO/DX INJ NEW DRUG ADDON: CPT

## 2021-08-13 PROCEDURE — A4216 STERILE WATER/SALINE, 10 ML: HCPCS | Performed by: INTERNAL MEDICINE

## 2021-08-13 RX ORDER — ERTAPENEM 1 G/1
INJECTION, POWDER, LYOPHILIZED, FOR SOLUTION INTRAMUSCULAR; INTRAVENOUS
Status: DISPENSED
Start: 2021-08-13 | End: 2021-08-14

## 2021-08-13 RX ORDER — 0.9 % SODIUM CHLORIDE 0.9 %
INTRAVENOUS SOLUTION INTRAVENOUS
Status: DISPENSED
Start: 2021-08-13 | End: 2021-08-14

## 2021-08-13 NOTE — PROGRESS NOTES
Pt to infusion for daily Invanz + dapto to treat right foot osteomyelitis. Surgical shoe to right foot. States some pain to foot, but he states that it is improving. Norco PRN. Daptomycin and invanz given. Tolerated well.   PICC flushed with good bloo

## 2021-08-14 ENCOUNTER — NURSE ONLY (OUTPATIENT)
Dept: HEMATOLOGY/ONCOLOGY | Facility: HOSPITAL | Age: 51
End: 2021-08-14
Attending: INTERNAL MEDICINE

## 2021-08-14 VITALS
OXYGEN SATURATION: 100 % | HEART RATE: 88 BPM | SYSTOLIC BLOOD PRESSURE: 118 MMHG | RESPIRATION RATE: 16 BRPM | TEMPERATURE: 98 F | DIASTOLIC BLOOD PRESSURE: 63 MMHG

## 2021-08-14 DIAGNOSIS — M86.171 ACUTE OSTEOMYELITIS OF METATARSAL BONE OF RIGHT FOOT (HCC): Primary | ICD-10-CM

## 2021-08-14 PROCEDURE — 96375 TX/PRO/DX INJ NEW DRUG ADDON: CPT

## 2021-08-14 PROCEDURE — 96374 THER/PROPH/DIAG INJ IV PUSH: CPT

## 2021-08-14 PROCEDURE — A4216 STERILE WATER/SALINE, 10 ML: HCPCS | Performed by: INTERNAL MEDICINE

## 2021-08-14 PROCEDURE — 96365 THER/PROPH/DIAG IV INF INIT: CPT

## 2021-08-14 NOTE — PROGRESS NOTES
Pt to infusion for daily Invanz + dapto to treat right foot osteomyelitis. Surgical shoe to right foot. States some pain to foot, but he states that it is improving. Norco PRN.   Reports fatigue, yesterday after infusion states he had heartburn yesterday

## 2021-08-15 ENCOUNTER — NURSE ONLY (OUTPATIENT)
Dept: HEMATOLOGY/ONCOLOGY | Facility: HOSPITAL | Age: 51
End: 2021-08-15
Attending: INTERNAL MEDICINE

## 2021-08-15 VITALS
TEMPERATURE: 97 F | OXYGEN SATURATION: 97 % | DIASTOLIC BLOOD PRESSURE: 68 MMHG | HEART RATE: 88 BPM | RESPIRATION RATE: 16 BRPM | SYSTOLIC BLOOD PRESSURE: 122 MMHG

## 2021-08-15 DIAGNOSIS — M86.171 ACUTE OSTEOMYELITIS OF METATARSAL BONE OF RIGHT FOOT (HCC): Primary | ICD-10-CM

## 2021-08-15 PROCEDURE — A4216 STERILE WATER/SALINE, 10 ML: HCPCS | Performed by: INTERNAL MEDICINE

## 2021-08-15 PROCEDURE — 96375 TX/PRO/DX INJ NEW DRUG ADDON: CPT

## 2021-08-15 PROCEDURE — 96365 THER/PROPH/DIAG IV INF INIT: CPT

## 2021-08-15 NOTE — PROGRESS NOTES
Pt to infusion for daily Invanz + dapto to treat right foot osteomyelitis. Surgical shoe to right foot. Ambulatory with cane. States some pain to foot, but he states that it is improving. Norco PRN.   Reports fatigue, trying to balance rest with activity

## 2021-08-16 ENCOUNTER — NURSE ONLY (OUTPATIENT)
Dept: HEMATOLOGY/ONCOLOGY | Facility: HOSPITAL | Age: 51
End: 2021-08-16
Attending: INTERNAL MEDICINE

## 2021-08-16 VITALS
DIASTOLIC BLOOD PRESSURE: 66 MMHG | SYSTOLIC BLOOD PRESSURE: 130 MMHG | OXYGEN SATURATION: 100 % | TEMPERATURE: 98 F | RESPIRATION RATE: 16 BRPM | HEART RATE: 86 BPM

## 2021-08-16 DIAGNOSIS — M86.171 ACUTE OSTEOMYELITIS OF METATARSAL BONE OF RIGHT FOOT (HCC): Primary | ICD-10-CM

## 2021-08-16 LAB
ALBUMIN SERPL-MCNC: 3.5 G/DL (ref 3.4–5)
ALBUMIN/GLOB SERPL: 0.7 {RATIO} (ref 1–2)
ALP LIVER SERPL-CCNC: 149 U/L
ALT SERPL-CCNC: 28 U/L
ANION GAP SERPL CALC-SCNC: 5 MMOL/L (ref 0–18)
AST SERPL-CCNC: 21 U/L (ref 15–37)
BASOPHILS # BLD AUTO: 0.05 X10(3) UL (ref 0–0.2)
BASOPHILS NFR BLD AUTO: 0.6 %
BILIRUB SERPL-MCNC: 0.4 MG/DL (ref 0.1–2)
BUN BLD-MCNC: 12 MG/DL (ref 7–18)
BUN/CREAT SERPL: 17.6 (ref 10–20)
CALCIUM BLD-MCNC: 9.3 MG/DL (ref 8.5–10.1)
CHLORIDE SERPL-SCNC: 106 MMOL/L (ref 98–112)
CK SERPL-CCNC: 76 U/L
CO2 SERPL-SCNC: 24 MMOL/L (ref 21–32)
CREAT BLD-MCNC: 0.68 MG/DL
CRP SERPL-MCNC: <0.29 MG/DL (ref ?–0.3)
DEPRECATED RDW RBC AUTO: 42.9 FL (ref 35.1–46.3)
EOSINOPHIL # BLD AUTO: 0.21 X10(3) UL (ref 0–0.7)
EOSINOPHIL NFR BLD AUTO: 2.3 %
ERYTHROCYTE [DISTWIDTH] IN BLOOD BY AUTOMATED COUNT: 13.1 % (ref 11–15)
GLOBULIN PLAS-MCNC: 4.7 G/DL (ref 2.8–4.4)
GLUCOSE BLD-MCNC: 176 MG/DL (ref 70–99)
HCT VFR BLD AUTO: 41.9 %
HGB BLD-MCNC: 14 G/DL
IMM GRANULOCYTES # BLD AUTO: 0.04 X10(3) UL (ref 0–1)
IMM GRANULOCYTES NFR BLD: 0.4 %
LYMPHOCYTES # BLD AUTO: 2.83 X10(3) UL (ref 1–4)
LYMPHOCYTES NFR BLD AUTO: 31.2 %
M PROTEIN MFR SERPL ELPH: 8.2 G/DL (ref 6.4–8.2)
MCH RBC QN AUTO: 29.7 PG (ref 26–34)
MCHC RBC AUTO-ENTMCNC: 33.4 G/DL (ref 31–37)
MCV RBC AUTO: 89 FL
MONOCYTES # BLD AUTO: 0.84 X10(3) UL (ref 0.1–1)
MONOCYTES NFR BLD AUTO: 9.3 %
NEUTROPHILS # BLD AUTO: 5.11 X10 (3) UL (ref 1.5–7.7)
NEUTROPHILS # BLD AUTO: 5.11 X10(3) UL (ref 1.5–7.7)
NEUTROPHILS NFR BLD AUTO: 56.2 %
OSMOLALITY SERPL CALC.SUM OF ELEC: 284 MOSM/KG (ref 275–295)
PLATELET # BLD AUTO: 188 10(3)UL (ref 150–450)
POTASSIUM SERPL-SCNC: 4.1 MMOL/L (ref 3.5–5.1)
RBC # BLD AUTO: 4.71 X10(6)UL
SODIUM SERPL-SCNC: 135 MMOL/L (ref 136–145)
WBC # BLD AUTO: 9.1 X10(3) UL (ref 4–11)

## 2021-08-16 PROCEDURE — 96375 TX/PRO/DX INJ NEW DRUG ADDON: CPT

## 2021-08-16 PROCEDURE — 82550 ASSAY OF CK (CPK): CPT | Performed by: INTERNAL MEDICINE

## 2021-08-16 PROCEDURE — 80053 COMPREHEN METABOLIC PANEL: CPT

## 2021-08-16 PROCEDURE — 86140 C-REACTIVE PROTEIN: CPT

## 2021-08-16 PROCEDURE — 85025 COMPLETE CBC W/AUTO DIFF WBC: CPT

## 2021-08-16 PROCEDURE — 96365 THER/PROPH/DIAG IV INF INIT: CPT

## 2021-08-16 PROCEDURE — A4216 STERILE WATER/SALINE, 10 ML: HCPCS | Performed by: INTERNAL MEDICINE

## 2021-08-16 RX ORDER — ERTAPENEM 1 G/1
INJECTION, POWDER, LYOPHILIZED, FOR SOLUTION INTRAMUSCULAR; INTRAVENOUS
Status: DISCONTINUED
Start: 2021-08-16 | End: 2021-08-16

## 2021-08-16 RX ORDER — 0.9 % SODIUM CHLORIDE 0.9 %
INTRAVENOUS SOLUTION INTRAVENOUS
Status: DISCONTINUED
Start: 2021-08-16 | End: 2021-08-16

## 2021-08-16 NOTE — PROGRESS NOTES
Patient arrives for daily antibiotics for cellulitis of right foot. Walking boot present to right foot. Reports he is well, denies any complaints. PICC line present to left upper arm, PICC flushed with saline, positive blood return noted. Labs collected.  D

## 2021-08-17 ENCOUNTER — NURSE ONLY (OUTPATIENT)
Dept: HEMATOLOGY/ONCOLOGY | Facility: HOSPITAL | Age: 51
End: 2021-08-17
Attending: INTERNAL MEDICINE

## 2021-08-17 VITALS
OXYGEN SATURATION: 99 % | SYSTOLIC BLOOD PRESSURE: 122 MMHG | RESPIRATION RATE: 16 BRPM | DIASTOLIC BLOOD PRESSURE: 72 MMHG | TEMPERATURE: 98 F | HEART RATE: 100 BPM

## 2021-08-17 DIAGNOSIS — M86.171 ACUTE OSTEOMYELITIS OF METATARSAL BONE OF RIGHT FOOT (HCC): Primary | ICD-10-CM

## 2021-08-17 PROCEDURE — 96375 TX/PRO/DX INJ NEW DRUG ADDON: CPT

## 2021-08-17 PROCEDURE — 96365 THER/PROPH/DIAG IV INF INIT: CPT

## 2021-08-17 PROCEDURE — A4216 STERILE WATER/SALINE, 10 ML: HCPCS | Performed by: INTERNAL MEDICINE

## 2021-08-17 RX ORDER — ERTAPENEM 1 G/1
INJECTION, POWDER, LYOPHILIZED, FOR SOLUTION INTRAMUSCULAR; INTRAVENOUS
Status: DISCONTINUED
Start: 2021-08-17 | End: 2021-08-17

## 2021-08-17 RX ORDER — 0.9 % SODIUM CHLORIDE 0.9 %
INTRAVENOUS SOLUTION INTRAVENOUS
Status: DISCONTINUED
Start: 2021-08-17 | End: 2021-08-17

## 2021-08-17 NOTE — PROGRESS NOTES
Patient arrives for daily antibiotics for cellulitis of right foot. Walking boot present to right foot. Dr. Kae Enriquez f/u today. Orders faxed to extend IV abx until 8/31. Offers no new complaints.   PICC line present to left upper arm, PICC flushed with s

## 2021-08-18 ENCOUNTER — NURSE ONLY (OUTPATIENT)
Dept: HEMATOLOGY/ONCOLOGY | Facility: HOSPITAL | Age: 51
End: 2021-08-18
Attending: INTERNAL MEDICINE

## 2021-08-18 ENCOUNTER — OFFICE VISIT (OUTPATIENT)
Dept: PODIATRY CLINIC | Facility: CLINIC | Age: 51
End: 2021-08-18
Payer: OTHER MISCELLANEOUS

## 2021-08-18 VITALS
SYSTOLIC BLOOD PRESSURE: 100 MMHG | RESPIRATION RATE: 16 BRPM | DIASTOLIC BLOOD PRESSURE: 71 MMHG | TEMPERATURE: 97 F | HEART RATE: 93 BPM | OXYGEN SATURATION: 99 %

## 2021-08-18 DIAGNOSIS — I99.8 ISCHEMIC PAIN OF FOOT, RIGHT: ICD-10-CM

## 2021-08-18 DIAGNOSIS — Z89.411 HISTORY OF PARTIAL RAY AMPUTATION OF RIGHT GREAT TOE (HCC): ICD-10-CM

## 2021-08-18 DIAGNOSIS — Z98.890 PERIPHERAL ARTERIAL DISEASE WITH HISTORY OF REVASCULARIZATION (HCC): ICD-10-CM

## 2021-08-18 DIAGNOSIS — I73.9 PERIPHERAL ARTERIAL DISEASE WITH HISTORY OF REVASCULARIZATION (HCC): ICD-10-CM

## 2021-08-18 DIAGNOSIS — M79.671 ISCHEMIC PAIN OF FOOT, RIGHT: ICD-10-CM

## 2021-08-18 DIAGNOSIS — E08.621 DIABETIC ULCER OF OTHER PART OF RIGHT FOOT ASSOCIATED WITH DIABETES MELLITUS DUE TO UNDERLYING CONDITION, WITH FAT LAYER EXPOSED (HCC): Primary | ICD-10-CM

## 2021-08-18 DIAGNOSIS — E11.65 UNCONTROLLED TYPE 2 DIABETES MELLITUS WITH HYPERGLYCEMIA (HCC): ICD-10-CM

## 2021-08-18 DIAGNOSIS — M86.171 ACUTE OSTEOMYELITIS OF METATARSAL BONE OF RIGHT FOOT (HCC): Primary | ICD-10-CM

## 2021-08-18 DIAGNOSIS — L97.512 DIABETIC ULCER OF OTHER PART OF RIGHT FOOT ASSOCIATED WITH DIABETES MELLITUS DUE TO UNDERLYING CONDITION, WITH FAT LAYER EXPOSED (HCC): Primary | ICD-10-CM

## 2021-08-18 PROCEDURE — 96375 TX/PRO/DX INJ NEW DRUG ADDON: CPT

## 2021-08-18 PROCEDURE — 99024 POSTOP FOLLOW-UP VISIT: CPT | Performed by: PODIATRIST

## 2021-08-18 PROCEDURE — A4216 STERILE WATER/SALINE, 10 ML: HCPCS | Performed by: INTERNAL MEDICINE

## 2021-08-18 PROCEDURE — 96365 THER/PROPH/DIAG IV INF INIT: CPT

## 2021-08-18 RX ORDER — ERTAPENEM 1 G/1
INJECTION, POWDER, LYOPHILIZED, FOR SOLUTION INTRAMUSCULAR; INTRAVENOUS
Status: COMPLETED
Start: 2021-08-18 | End: 2021-08-18

## 2021-08-18 RX ORDER — 0.9 % SODIUM CHLORIDE 0.9 %
INTRAVENOUS SOLUTION INTRAVENOUS
Status: COMPLETED
Start: 2021-08-18 | End: 2021-08-18

## 2021-08-18 RX ORDER — HYDROCODONE BITARTRATE AND ACETAMINOPHEN 5; 325 MG/1; MG/1
1 TABLET ORAL NIGHTLY PRN
Qty: 7 TABLET | Refills: 0 | Status: SHIPPED | OUTPATIENT
Start: 2021-08-18 | End: 2021-08-25 | Stop reason: ALTCHOICE

## 2021-08-18 RX ADMIN — 0.9 % SODIUM CHLORIDE 100 ML: 0.9 % INTRAVENOUS SOLUTION INTRAVENOUS at 11:28:00

## 2021-08-18 NOTE — PROGRESS NOTES
[facility], [facility] Podiatry  Progress Note    David Lipoma is a 49year old male.  Patient presents with:  Post-Op: right great amputation, FBS this am 182, pain at a 6/10 today, pain worse at night, last A1c was 7/14/21 at 10.2        HPI:     Leida Dorado SURGICAL HISTORY Right 04/2021    hallux amputation      Family History   Problem Relation Age of Onset   • Cancer Mother       Social History    Socioeconomic History      Marital status: Single      Spouse name: Not on file      Number of children: Not o Examination:  Muscle Strength is 5/5.   Right partial 1st ray amputation   POP to right foot     Severe HDS right foot 2-5    LABS & IMAGING:     Lab Results   Component Value Date     (H) 08/16/2021    BUN 12 08/16/2021    CREATSERUM 0.68 (L) 08/16/ excisional debridement of wound was performed to the level of and including subcutaneous tissue with #15 blade, dermal curette, or moistened gauze as listed below. Pt tolerated debridement well.  Granular tissue/wound base was achieved with healthy bleeding

## 2021-08-18 NOTE — PROGRESS NOTES
Pt here for daily Invanz + dapto. Appeared to tolerate infusion, no s/s of adverse reaction noted. Discharged home ambulating independently.       EOT:8/31

## 2021-08-19 ENCOUNTER — NURSE ONLY (OUTPATIENT)
Dept: HEMATOLOGY/ONCOLOGY | Facility: HOSPITAL | Age: 51
End: 2021-08-19
Attending: INTERNAL MEDICINE

## 2021-08-19 ENCOUNTER — TELEPHONE (OUTPATIENT)
Dept: PODIATRY CLINIC | Facility: CLINIC | Age: 51
End: 2021-08-19

## 2021-08-19 VITALS
OXYGEN SATURATION: 99 % | RESPIRATION RATE: 16 BRPM | TEMPERATURE: 98 F | SYSTOLIC BLOOD PRESSURE: 145 MMHG | HEART RATE: 106 BPM | DIASTOLIC BLOOD PRESSURE: 84 MMHG

## 2021-08-19 DIAGNOSIS — M86.171 ACUTE OSTEOMYELITIS OF METATARSAL BONE OF RIGHT FOOT (HCC): Primary | ICD-10-CM

## 2021-08-19 PROCEDURE — 96375 TX/PRO/DX INJ NEW DRUG ADDON: CPT

## 2021-08-19 PROCEDURE — 96365 THER/PROPH/DIAG IV INF INIT: CPT

## 2021-08-19 PROCEDURE — A4216 STERILE WATER/SALINE, 10 ML: HCPCS | Performed by: INTERNAL MEDICINE

## 2021-08-19 RX ORDER — 0.9 % SODIUM CHLORIDE 0.9 %
INTRAVENOUS SOLUTION INTRAVENOUS
Status: DISCONTINUED
Start: 2021-08-19 | End: 2021-08-19

## 2021-08-19 RX ORDER — ERTAPENEM 1 G/1
INJECTION, POWDER, LYOPHILIZED, FOR SOLUTION INTRAMUSCULAR; INTRAVENOUS
Status: COMPLETED
Start: 2021-08-19 | End: 2021-08-19

## 2021-08-19 RX ADMIN — ERTAPENEM 1 G: 1 INJECTION, POWDER, LYOPHILIZED, FOR SOLUTION INTRAMUSCULAR; INTRAVENOUS at 15:43:00

## 2021-08-19 NOTE — PROGRESS NOTES
Pt here for daily Invanz + dapto. Appeared to tolerate infusion, no s/s of adverse reaction noted. Discharged home ambulating independently.       EOT:8/31   F/U: Jhony 8/31

## 2021-08-19 NOTE — TELEPHONE ENCOUNTER
Per pt his  received only the cover letter and not the work note. Per pt please refax and asking to call him back when it has been faxed.  Please advise

## 2021-08-19 NOTE — PROGRESS NOTES
Pt to infusion for daily Invanz + dapto to treat right foot gangrenous diabetic wound. Surgical shoe to right foot. . States keeps weight off his foot as much as possible. Uses cane with ambulation.     Daptomycin invanz infused with no s/s of adverse reacti

## 2021-08-20 ENCOUNTER — TELEPHONE (OUTPATIENT)
Dept: PODIATRY CLINIC | Facility: CLINIC | Age: 51
End: 2021-08-20

## 2021-08-20 ENCOUNTER — NURSE ONLY (OUTPATIENT)
Dept: HEMATOLOGY/ONCOLOGY | Facility: HOSPITAL | Age: 51
End: 2021-08-20
Attending: INTERNAL MEDICINE

## 2021-08-20 VITALS
RESPIRATION RATE: 16 BRPM | DIASTOLIC BLOOD PRESSURE: 66 MMHG | SYSTOLIC BLOOD PRESSURE: 112 MMHG | HEART RATE: 89 BPM | OXYGEN SATURATION: 99 % | TEMPERATURE: 98 F

## 2021-08-20 DIAGNOSIS — M86.171 ACUTE OSTEOMYELITIS OF METATARSAL BONE OF RIGHT FOOT (HCC): Primary | ICD-10-CM

## 2021-08-20 PROCEDURE — 96365 THER/PROPH/DIAG IV INF INIT: CPT

## 2021-08-20 PROCEDURE — 96375 TX/PRO/DX INJ NEW DRUG ADDON: CPT

## 2021-08-20 PROCEDURE — A4216 STERILE WATER/SALINE, 10 ML: HCPCS | Performed by: INTERNAL MEDICINE

## 2021-08-20 RX ORDER — 0.9 % SODIUM CHLORIDE 0.9 %
INTRAVENOUS SOLUTION INTRAVENOUS
Status: DISCONTINUED
Start: 2021-08-20 | End: 2021-08-20

## 2021-08-20 RX ORDER — ERTAPENEM 1 G/1
INJECTION, POWDER, LYOPHILIZED, FOR SOLUTION INTRAMUSCULAR; INTRAVENOUS
Status: DISCONTINUED
Start: 2021-08-20 | End: 2021-08-20

## 2021-08-20 NOTE — TELEPHONE ENCOUNTER
Dr. Kevin Smith, patient states went to [place] today to drop off download at . [facility]  staff was going to print and give you the reports.  Patient is requesting you review these results as his  is saying it is important to avoid an

## 2021-08-20 NOTE — TELEPHONE ENCOUNTER
Pt dropped off Medical Report that needs to be reviewed by Dr. Erick Hudson   Pt states that it is very urgent for this report to be reviewed bc according to pt he is going to get his whole leg amputated and he does not want that.    Pt is requesting for Dr. Jim Dennis

## 2021-08-20 NOTE — TELEPHONE ENCOUNTER
Spoke to patient confirmed letter he is referring to is the one generated on 8/18/21. Was provided with fax of [phone] attention Francesca Howard. Letter faxed as requested. No further action required at this time.

## 2021-08-21 ENCOUNTER — NURSE ONLY (OUTPATIENT)
Dept: HEMATOLOGY/ONCOLOGY | Facility: HOSPITAL | Age: 51
End: 2021-08-21
Attending: INTERNAL MEDICINE

## 2021-08-21 VITALS
RESPIRATION RATE: 16 BRPM | HEART RATE: 96 BPM | SYSTOLIC BLOOD PRESSURE: 128 MMHG | TEMPERATURE: 98 F | OXYGEN SATURATION: 99 % | DIASTOLIC BLOOD PRESSURE: 83 MMHG

## 2021-08-21 DIAGNOSIS — M86.171 ACUTE OSTEOMYELITIS OF METATARSAL BONE OF RIGHT FOOT (HCC): Primary | ICD-10-CM

## 2021-08-21 PROCEDURE — A4216 STERILE WATER/SALINE, 10 ML: HCPCS | Performed by: INTERNAL MEDICINE

## 2021-08-21 PROCEDURE — 96365 THER/PROPH/DIAG IV INF INIT: CPT

## 2021-08-21 PROCEDURE — 96375 TX/PRO/DX INJ NEW DRUG ADDON: CPT

## 2021-08-21 RX ORDER — ERTAPENEM 1 G/1
INJECTION, POWDER, LYOPHILIZED, FOR SOLUTION INTRAMUSCULAR; INTRAVENOUS
Status: DISCONTINUED
Start: 2021-08-21 | End: 2021-08-21

## 2021-08-21 RX ORDER — 0.9 % SODIUM CHLORIDE 0.9 %
INTRAVENOUS SOLUTION INTRAVENOUS
Status: DISCONTINUED
Start: 2021-08-21 | End: 2021-08-21

## 2021-08-22 ENCOUNTER — NURSE ONLY (OUTPATIENT)
Dept: HEMATOLOGY/ONCOLOGY | Facility: HOSPITAL | Age: 51
End: 2021-08-22
Attending: INTERNAL MEDICINE

## 2021-08-22 VITALS
DIASTOLIC BLOOD PRESSURE: 61 MMHG | SYSTOLIC BLOOD PRESSURE: 102 MMHG | HEART RATE: 90 BPM | OXYGEN SATURATION: 100 % | RESPIRATION RATE: 16 BRPM | TEMPERATURE: 98 F

## 2021-08-22 DIAGNOSIS — M86.171 ACUTE OSTEOMYELITIS OF METATARSAL BONE OF RIGHT FOOT (HCC): Primary | ICD-10-CM

## 2021-08-22 PROCEDURE — 96375 TX/PRO/DX INJ NEW DRUG ADDON: CPT

## 2021-08-22 PROCEDURE — 96365 THER/PROPH/DIAG IV INF INIT: CPT

## 2021-08-22 PROCEDURE — A4216 STERILE WATER/SALINE, 10 ML: HCPCS | Performed by: INTERNAL MEDICINE

## 2021-08-22 RX ORDER — ERTAPENEM 1 G/1
INJECTION, POWDER, LYOPHILIZED, FOR SOLUTION INTRAMUSCULAR; INTRAVENOUS
Status: DISCONTINUED
Start: 2021-08-22 | End: 2021-08-22

## 2021-08-22 RX ORDER — 0.9 % SODIUM CHLORIDE 0.9 %
INTRAVENOUS SOLUTION INTRAVENOUS
Status: DISCONTINUED
Start: 2021-08-22 | End: 2021-08-22

## 2021-08-22 NOTE — PROGRESS NOTES
Pt here for daily Invanz + dapto for osteomylitis of foot. Anticipating partial amputation of foot when IV antibiotics complete. Has appt with podiatrist this week. Tolerated infusion, no s/s of adverse reaction noted.  Discharged home ambulating with cane

## 2021-08-22 NOTE — TELEPHONE ENCOUNTER
Please inform patient we can review it together during his appointment. Please ensure that it is uploaded in Kevin.

## 2021-08-23 ENCOUNTER — NURSE ONLY (OUTPATIENT)
Dept: HEMATOLOGY/ONCOLOGY | Facility: HOSPITAL | Age: 51
End: 2021-08-23
Attending: INTERNAL MEDICINE

## 2021-08-23 VITALS
SYSTOLIC BLOOD PRESSURE: 104 MMHG | RESPIRATION RATE: 16 BRPM | TEMPERATURE: 98 F | HEART RATE: 84 BPM | DIASTOLIC BLOOD PRESSURE: 65 MMHG

## 2021-08-23 DIAGNOSIS — M86.171 ACUTE OSTEOMYELITIS OF METATARSAL BONE OF RIGHT FOOT (HCC): Primary | ICD-10-CM

## 2021-08-23 LAB
ALBUMIN SERPL-MCNC: 3.6 G/DL (ref 3.4–5)
ALBUMIN/GLOB SERPL: 0.8 {RATIO} (ref 1–2)
ALP LIVER SERPL-CCNC: 160 U/L
ALT SERPL-CCNC: 28 U/L
ANION GAP SERPL CALC-SCNC: 5 MMOL/L (ref 0–18)
AST SERPL-CCNC: 19 U/L (ref 15–37)
BASOPHILS # BLD AUTO: 0.04 X10(3) UL (ref 0–0.2)
BASOPHILS NFR BLD AUTO: 0.5 %
BILIRUB SERPL-MCNC: 0.3 MG/DL (ref 0.1–2)
BUN BLD-MCNC: 13 MG/DL (ref 7–18)
BUN/CREAT SERPL: 18.8 (ref 10–20)
CALCIUM BLD-MCNC: 8.8 MG/DL (ref 8.5–10.1)
CHLORIDE SERPL-SCNC: 106 MMOL/L (ref 98–112)
CK SERPL-CCNC: 64 U/L
CO2 SERPL-SCNC: 26 MMOL/L (ref 21–32)
CREAT BLD-MCNC: 0.69 MG/DL
CRP SERPL-MCNC: <0.29 MG/DL (ref ?–0.3)
DEPRECATED RDW RBC AUTO: 42.5 FL (ref 35.1–46.3)
EOSINOPHIL # BLD AUTO: 0.21 X10(3) UL (ref 0–0.7)
EOSINOPHIL NFR BLD AUTO: 2.4 %
ERYTHROCYTE [DISTWIDTH] IN BLOOD BY AUTOMATED COUNT: 13.1 % (ref 11–15)
GLOBULIN PLAS-MCNC: 4.3 G/DL (ref 2.8–4.4)
GLUCOSE BLD-MCNC: 262 MG/DL (ref 70–99)
HCT VFR BLD AUTO: 42 %
HGB BLD-MCNC: 14 G/DL
IMM GRANULOCYTES # BLD AUTO: 0.02 X10(3) UL (ref 0–1)
IMM GRANULOCYTES NFR BLD: 0.2 %
LYMPHOCYTES # BLD AUTO: 2.61 X10(3) UL (ref 1–4)
LYMPHOCYTES NFR BLD AUTO: 29.4 %
M PROTEIN MFR SERPL ELPH: 7.9 G/DL (ref 6.4–8.2)
MCH RBC QN AUTO: 29.4 PG (ref 26–34)
MCHC RBC AUTO-ENTMCNC: 33.3 G/DL (ref 31–37)
MCV RBC AUTO: 88.2 FL
MONOCYTES # BLD AUTO: 0.86 X10(3) UL (ref 0.1–1)
MONOCYTES NFR BLD AUTO: 9.7 %
NEUTROPHILS # BLD AUTO: 5.13 X10 (3) UL (ref 1.5–7.7)
NEUTROPHILS # BLD AUTO: 5.13 X10(3) UL (ref 1.5–7.7)
NEUTROPHILS NFR BLD AUTO: 57.8 %
OSMOLALITY SERPL CALC.SUM OF ELEC: 293 MOSM/KG (ref 275–295)
PLATELET # BLD AUTO: 190 10(3)UL (ref 150–450)
POTASSIUM SERPL-SCNC: 4.3 MMOL/L (ref 3.5–5.1)
RBC # BLD AUTO: 4.76 X10(6)UL
SODIUM SERPL-SCNC: 137 MMOL/L (ref 136–145)
WBC # BLD AUTO: 8.9 X10(3) UL (ref 4–11)

## 2021-08-23 PROCEDURE — 85025 COMPLETE CBC W/AUTO DIFF WBC: CPT

## 2021-08-23 PROCEDURE — 82550 ASSAY OF CK (CPK): CPT | Performed by: INTERNAL MEDICINE

## 2021-08-23 PROCEDURE — 96375 TX/PRO/DX INJ NEW DRUG ADDON: CPT

## 2021-08-23 PROCEDURE — 86140 C-REACTIVE PROTEIN: CPT

## 2021-08-23 PROCEDURE — A4216 STERILE WATER/SALINE, 10 ML: HCPCS | Performed by: INTERNAL MEDICINE

## 2021-08-23 PROCEDURE — 80053 COMPREHEN METABOLIC PANEL: CPT

## 2021-08-23 PROCEDURE — 96365 THER/PROPH/DIAG IV INF INIT: CPT

## 2021-08-23 RX ORDER — ERTAPENEM 1 G/1
INJECTION, POWDER, LYOPHILIZED, FOR SOLUTION INTRAMUSCULAR; INTRAVENOUS
Status: DISCONTINUED
Start: 2021-08-23 | End: 2021-08-23

## 2021-08-23 RX ORDER — 0.9 % SODIUM CHLORIDE 0.9 %
INTRAVENOUS SOLUTION INTRAVENOUS
Status: DISCONTINUED
Start: 2021-08-23 | End: 2021-08-23

## 2021-08-24 ENCOUNTER — NURSE ONLY (OUTPATIENT)
Dept: HEMATOLOGY/ONCOLOGY | Facility: HOSPITAL | Age: 51
End: 2021-08-24
Attending: INTERNAL MEDICINE

## 2021-08-24 VITALS
TEMPERATURE: 98 F | SYSTOLIC BLOOD PRESSURE: 98 MMHG | HEART RATE: 97 BPM | OXYGEN SATURATION: 100 % | DIASTOLIC BLOOD PRESSURE: 65 MMHG | RESPIRATION RATE: 16 BRPM

## 2021-08-24 DIAGNOSIS — M86.171 ACUTE OSTEOMYELITIS OF METATARSAL BONE OF RIGHT FOOT (HCC): Primary | ICD-10-CM

## 2021-08-24 PROCEDURE — 96365 THER/PROPH/DIAG IV INF INIT: CPT

## 2021-08-24 PROCEDURE — A4216 STERILE WATER/SALINE, 10 ML: HCPCS | Performed by: INTERNAL MEDICINE

## 2021-08-24 PROCEDURE — 96375 TX/PRO/DX INJ NEW DRUG ADDON: CPT

## 2021-08-24 RX ORDER — ERTAPENEM 1 G/1
INJECTION, POWDER, LYOPHILIZED, FOR SOLUTION INTRAMUSCULAR; INTRAVENOUS
Status: DISCONTINUED
Start: 2021-08-24 | End: 2021-08-24

## 2021-08-24 RX ORDER — ERTAPENEM 1 G/1
INJECTION, POWDER, LYOPHILIZED, FOR SOLUTION INTRAMUSCULAR; INTRAVENOUS
Status: DISCONTINUED
Start: 2021-08-24 | End: 2021-08-24 | Stop reason: WASHOUT

## 2021-08-24 NOTE — TELEPHONE ENCOUNTER
Pt notified per Dr. Armas Mace message as pt has scheduled appt on 8/25/21.    RAY report is under media tab scanned in on 8/24/21

## 2021-08-24 NOTE — PROGRESS NOTES
Patient arrives for daily antibiotics for cellulitis of right foot. Walking boot present to right foot. Reports he is well, denies any complaints. PICC line present to left upper arm, PICC flushed with saline, positive blood return noted. Dapto given over t

## 2021-08-25 ENCOUNTER — HOSPITAL ENCOUNTER (OUTPATIENT)
Dept: GENERAL RADIOLOGY | Age: 51
Discharge: HOME | End: 2021-08-25
Attending: PODIATRIST
Payer: OTHER MISCELLANEOUS

## 2021-08-25 ENCOUNTER — APPOINTMENT (OUTPATIENT)
Dept: HEMATOLOGY/ONCOLOGY | Facility: HOSPITAL | Age: 51
End: 2021-08-25
Attending: INTERNAL MEDICINE

## 2021-08-25 ENCOUNTER — OFFICE VISIT (OUTPATIENT)
Dept: PODIATRY CLINIC | Facility: CLINIC | Age: 51
End: 2021-08-25
Payer: OTHER MISCELLANEOUS

## 2021-08-25 VITALS
OXYGEN SATURATION: 100 % | RESPIRATION RATE: 16 BRPM | DIASTOLIC BLOOD PRESSURE: 80 MMHG | SYSTOLIC BLOOD PRESSURE: 131 MMHG | HEART RATE: 93 BPM | TEMPERATURE: 97 F

## 2021-08-25 DIAGNOSIS — M79.671 ISCHEMIC PAIN OF FOOT, RIGHT: ICD-10-CM

## 2021-08-25 DIAGNOSIS — I73.9 PERIPHERAL ARTERIAL DISEASE WITH HISTORY OF REVASCULARIZATION (HCC): ICD-10-CM

## 2021-08-25 DIAGNOSIS — M86.171 ACUTE OSTEOMYELITIS OF METATARSAL BONE OF RIGHT FOOT (HCC): Primary | ICD-10-CM

## 2021-08-25 DIAGNOSIS — Z98.890 PERIPHERAL ARTERIAL DISEASE WITH HISTORY OF REVASCULARIZATION (HCC): ICD-10-CM

## 2021-08-25 DIAGNOSIS — Z89.411 HISTORY OF PARTIAL RAY AMPUTATION OF RIGHT GREAT TOE (HCC): ICD-10-CM

## 2021-08-25 DIAGNOSIS — L97.512 DIABETIC ULCER OF OTHER PART OF RIGHT FOOT ASSOCIATED WITH DIABETES MELLITUS DUE TO UNDERLYING CONDITION, WITH FAT LAYER EXPOSED (HCC): Primary | ICD-10-CM

## 2021-08-25 DIAGNOSIS — I99.8 ISCHEMIC PAIN OF FOOT, RIGHT: ICD-10-CM

## 2021-08-25 DIAGNOSIS — E08.621 DIABETIC ULCER OF OTHER PART OF RIGHT FOOT ASSOCIATED WITH DIABETES MELLITUS DUE TO UNDERLYING CONDITION, WITH FAT LAYER EXPOSED (HCC): Primary | ICD-10-CM

## 2021-08-25 DIAGNOSIS — L97.512 DIABETIC ULCER OF OTHER PART OF RIGHT FOOT ASSOCIATED WITH DIABETES MELLITUS DUE TO UNDERLYING CONDITION, WITH FAT LAYER EXPOSED (HCC): ICD-10-CM

## 2021-08-25 DIAGNOSIS — E08.621 DIABETIC ULCER OF OTHER PART OF RIGHT FOOT ASSOCIATED WITH DIABETES MELLITUS DUE TO UNDERLYING CONDITION, WITH FAT LAYER EXPOSED (HCC): ICD-10-CM

## 2021-08-25 PROCEDURE — 96365 THER/PROPH/DIAG IV INF INIT: CPT

## 2021-08-25 PROCEDURE — A4216 STERILE WATER/SALINE, 10 ML: HCPCS | Performed by: INTERNAL MEDICINE

## 2021-08-25 PROCEDURE — 96375 TX/PRO/DX INJ NEW DRUG ADDON: CPT

## 2021-08-25 PROCEDURE — 73630 X-RAY EXAM OF FOOT: CPT | Performed by: PODIATRIST

## 2021-08-25 PROCEDURE — 99024 POSTOP FOLLOW-UP VISIT: CPT | Performed by: PODIATRIST

## 2021-08-25 RX ORDER — ERTAPENEM 1 G/1
INJECTION, POWDER, LYOPHILIZED, FOR SOLUTION INTRAMUSCULAR; INTRAVENOUS
Status: DISCONTINUED
Start: 2021-08-25 | End: 2021-08-25

## 2021-08-25 RX ORDER — 0.9 % SODIUM CHLORIDE 0.9 %
INTRAVENOUS SOLUTION INTRAVENOUS
Status: DISCONTINUED
Start: 2021-08-25 | End: 2021-08-25

## 2021-08-25 NOTE — PROGRESS NOTES
Patient arrives for daily antibiotics for cellulitis of right foot. Walking boot present to right foot. Reports he is well, denies any complaints.  Saw his podiatrist today and states he will have probable surgery as soon as next Monday- still awaiting to h

## 2021-08-25 NOTE — PROGRESS NOTES
[facility] Podiatry  Progress Note    Shahida Garcia is a 49year old male. Patient presents with:  Post-Op: Sx, 7/22. R foot, great toe amputation. BS this morning was 136. States pain and soreness. Has some tingling at tip of toes.  Pain scale 8-9/1 [facility])    • Gangrene of toe of right foot ([facility]    • Heart attack [facility])       Past Surgical History:   Procedure Laterality Date   • OTHER SURGICAL HISTORY Right 04/2021    hallux amputation      Family History   Problem Relation Age of Onset   • Cancer Mot exposed without necrosis:  with necrosis:      Neurological Examination:  Light touch sensation intact Right foot  Musculoskeletal Examination:  Muscle Strength is 5/5.   Right partial 1st ray amputation   POP to right foot     Severe HDS right foot 2-5 (See overall size of wound to calculate debridement size)  Sharp excisional debridement of wound was performed to the level of and including subcutaneous tissue with #15 blade, dermal curette, or moistened gauze as listed below.  Pt tolerated debridement we admitted to [address] [place] [address] for probable TMA      No follow-ups on file.     Jozef Cortes, DPM  8/25/21

## 2021-08-26 ENCOUNTER — NURSE ONLY (OUTPATIENT)
Dept: HEMATOLOGY/ONCOLOGY | Facility: HOSPITAL | Age: 51
End: 2021-08-26
Attending: INTERNAL MEDICINE

## 2021-08-26 VITALS
OXYGEN SATURATION: 99 % | DIASTOLIC BLOOD PRESSURE: 63 MMHG | SYSTOLIC BLOOD PRESSURE: 97 MMHG | RESPIRATION RATE: 16 BRPM | TEMPERATURE: 98 F | HEART RATE: 90 BPM

## 2021-08-26 DIAGNOSIS — M86.171 ACUTE OSTEOMYELITIS OF METATARSAL BONE OF RIGHT FOOT (HCC): Primary | ICD-10-CM

## 2021-08-26 PROCEDURE — A4216 STERILE WATER/SALINE, 10 ML: HCPCS | Performed by: INTERNAL MEDICINE

## 2021-08-26 PROCEDURE — 96365 THER/PROPH/DIAG IV INF INIT: CPT

## 2021-08-26 PROCEDURE — 96375 TX/PRO/DX INJ NEW DRUG ADDON: CPT

## 2021-08-26 RX ORDER — ERTAPENEM 1 G/1
INJECTION, POWDER, LYOPHILIZED, FOR SOLUTION INTRAMUSCULAR; INTRAVENOUS
Status: DISCONTINUED
Start: 2021-08-26 | End: 2021-08-26

## 2021-08-26 RX ORDER — 0.9 % SODIUM CHLORIDE 0.9 %
INTRAVENOUS SOLUTION INTRAVENOUS
Status: DISCONTINUED
Start: 2021-08-26 | End: 2021-08-26

## 2021-08-26 NOTE — PROGRESS NOTES
Pt to infusion for daily Invanz + dapto to treat right foot gangrenous diabetic wound. Surgical shoe to right foot. Possible surgery Monday per Yady Walker  Daptomycin invanz infused with no s/s of adverse reaction noted. Most current ck level appropriate.  Inv

## 2021-08-27 ENCOUNTER — TELEPHONE (OUTPATIENT)
Dept: HEMATOLOGY/ONCOLOGY | Facility: HOSPITAL | Age: 51
End: 2021-08-27

## 2021-08-27 ENCOUNTER — NURSE ONLY (OUTPATIENT)
Dept: HEMATOLOGY/ONCOLOGY | Facility: HOSPITAL | Age: 51
End: 2021-08-27
Attending: INTERNAL MEDICINE

## 2021-08-27 VITALS
HEART RATE: 95 BPM | DIASTOLIC BLOOD PRESSURE: 79 MMHG | SYSTOLIC BLOOD PRESSURE: 154 MMHG | TEMPERATURE: 98 F | OXYGEN SATURATION: 100 % | BODY MASS INDEX: 25 KG/M2 | RESPIRATION RATE: 16 BRPM | WEIGHT: 178 LBS

## 2021-08-27 DIAGNOSIS — M86.171 ACUTE OSTEOMYELITIS OF METATARSAL BONE OF RIGHT FOOT (HCC): Primary | ICD-10-CM

## 2021-08-27 PROCEDURE — A4216 STERILE WATER/SALINE, 10 ML: HCPCS | Performed by: INTERNAL MEDICINE

## 2021-08-27 PROCEDURE — 96365 THER/PROPH/DIAG IV INF INIT: CPT

## 2021-08-27 PROCEDURE — 96375 TX/PRO/DX INJ NEW DRUG ADDON: CPT

## 2021-08-27 RX ORDER — 0.9 % SODIUM CHLORIDE 0.9 %
INTRAVENOUS SOLUTION INTRAVENOUS
Status: DISCONTINUED
Start: 2021-08-27 | End: 2021-08-27

## 2021-08-27 RX ORDER — ERTAPENEM 1 G/1
INJECTION, POWDER, LYOPHILIZED, FOR SOLUTION INTRAMUSCULAR; INTRAVENOUS
Status: DISCONTINUED
Start: 2021-08-27 | End: 2021-08-27

## 2021-08-27 NOTE — PROGRESS NOTES
Patient arrives for daily antibiotics for cellulitis of right foot. Walking boot present to right foot. Reports he is well, denies any complaints. He states he will have probable surgery as soon as next Monday.     PICC line present to left upper arm, PICC

## 2021-08-28 ENCOUNTER — NURSE ONLY (OUTPATIENT)
Dept: HEMATOLOGY/ONCOLOGY | Facility: HOSPITAL | Age: 51
End: 2021-08-28
Attending: INTERNAL MEDICINE

## 2021-08-28 VITALS
OXYGEN SATURATION: 100 % | DIASTOLIC BLOOD PRESSURE: 72 MMHG | RESPIRATION RATE: 16 BRPM | SYSTOLIC BLOOD PRESSURE: 120 MMHG | HEART RATE: 86 BPM | TEMPERATURE: 97 F

## 2021-08-28 DIAGNOSIS — M86.171 ACUTE OSTEOMYELITIS OF METATARSAL BONE OF RIGHT FOOT (HCC): Primary | ICD-10-CM

## 2021-08-28 PROCEDURE — A4216 STERILE WATER/SALINE, 10 ML: HCPCS | Performed by: INTERNAL MEDICINE

## 2021-08-28 PROCEDURE — 96365 THER/PROPH/DIAG IV INF INIT: CPT

## 2021-08-28 PROCEDURE — 96375 TX/PRO/DX INJ NEW DRUG ADDON: CPT

## 2021-08-28 RX ORDER — ERTAPENEM 1 G/1
INJECTION, POWDER, LYOPHILIZED, FOR SOLUTION INTRAMUSCULAR; INTRAVENOUS
Status: DISCONTINUED
Start: 2021-08-28 | End: 2021-08-28

## 2021-08-28 RX ORDER — 0.9 % SODIUM CHLORIDE 0.9 %
INTRAVENOUS SOLUTION INTRAVENOUS
Status: DISCONTINUED
Start: 2021-08-28 | End: 2021-08-28

## 2021-08-28 NOTE — PROGRESS NOTES
Patient arrives for daily antibiotics for cellulitis of right foot. Walking boot present to right foot. Reports he does not have much of an appetite, encouraged small, high protein meals to promote healing. Denies upset stomach.  He states he will have prob

## 2021-08-29 ENCOUNTER — NURSE ONLY (OUTPATIENT)
Dept: HEMATOLOGY/ONCOLOGY | Facility: HOSPITAL | Age: 51
End: 2021-08-29
Attending: INTERNAL MEDICINE

## 2021-08-29 VITALS
HEART RATE: 99 BPM | RESPIRATION RATE: 16 BRPM | TEMPERATURE: 97 F | DIASTOLIC BLOOD PRESSURE: 62 MMHG | SYSTOLIC BLOOD PRESSURE: 106 MMHG | OXYGEN SATURATION: 98 %

## 2021-08-29 DIAGNOSIS — M86.171 ACUTE OSTEOMYELITIS OF METATARSAL BONE OF RIGHT FOOT (HCC): Primary | ICD-10-CM

## 2021-08-29 PROCEDURE — 96365 THER/PROPH/DIAG IV INF INIT: CPT

## 2021-08-29 PROCEDURE — A4216 STERILE WATER/SALINE, 10 ML: HCPCS | Performed by: INTERNAL MEDICINE

## 2021-08-29 PROCEDURE — 96375 TX/PRO/DX INJ NEW DRUG ADDON: CPT

## 2021-08-29 NOTE — PROGRESS NOTES
Patient arrives for daily antibiotics for cellulitis of right foot. Walking boot present to right foot. Denies pain, fever or chills. Decreased appetite and energy level. He states he will have probable surgery as soon as tomorrow after MD appointment.

## 2021-08-30 ENCOUNTER — TELEPHONE (OUTPATIENT)
Dept: PODIATRY CLINIC | Facility: CLINIC | Age: 51
End: 2021-08-30

## 2021-08-30 ENCOUNTER — OFFICE VISIT (OUTPATIENT)
Dept: PODIATRY CLINIC | Facility: CLINIC | Age: 51
End: 2021-08-30
Payer: OTHER MISCELLANEOUS

## 2021-08-30 ENCOUNTER — NURSE ONLY (OUTPATIENT)
Dept: HEMATOLOGY/ONCOLOGY | Facility: HOSPITAL | Age: 51
End: 2021-08-30
Attending: INTERNAL MEDICINE

## 2021-08-30 VITALS
HEART RATE: 104 BPM | RESPIRATION RATE: 16 BRPM | TEMPERATURE: 98 F | DIASTOLIC BLOOD PRESSURE: 75 MMHG | OXYGEN SATURATION: 100 % | SYSTOLIC BLOOD PRESSURE: 119 MMHG

## 2021-08-30 DIAGNOSIS — Z98.890 PERIPHERAL ARTERIAL DISEASE WITH HISTORY OF REVASCULARIZATION (HCC): ICD-10-CM

## 2021-08-30 DIAGNOSIS — I99.8 ISCHEMIC PAIN OF FOOT, RIGHT: ICD-10-CM

## 2021-08-30 DIAGNOSIS — I73.9 PERIPHERAL ARTERIAL DISEASE WITH HISTORY OF REVASCULARIZATION (HCC): ICD-10-CM

## 2021-08-30 DIAGNOSIS — L97.512 DIABETIC ULCER OF OTHER PART OF RIGHT FOOT ASSOCIATED WITH DIABETES MELLITUS DUE TO UNDERLYING CONDITION, WITH FAT LAYER EXPOSED (HCC): Primary | ICD-10-CM

## 2021-08-30 DIAGNOSIS — M79.671 ISCHEMIC PAIN OF FOOT, RIGHT: ICD-10-CM

## 2021-08-30 DIAGNOSIS — E08.621 DIABETIC ULCER OF OTHER PART OF RIGHT FOOT ASSOCIATED WITH DIABETES MELLITUS DUE TO UNDERLYING CONDITION, WITH FAT LAYER EXPOSED (HCC): Primary | ICD-10-CM

## 2021-08-30 DIAGNOSIS — Z89.411 HISTORY OF PARTIAL RAY AMPUTATION OF RIGHT GREAT TOE (HCC): ICD-10-CM

## 2021-08-30 DIAGNOSIS — M86.171 ACUTE OSTEOMYELITIS OF METATARSAL BONE OF RIGHT FOOT (HCC): Primary | ICD-10-CM

## 2021-08-30 LAB
ALBUMIN SERPL-MCNC: 3.9 G/DL (ref 3.4–5)
ALBUMIN/GLOB SERPL: 0.8 {RATIO} (ref 1–2)
ALP LIVER SERPL-CCNC: 160 U/L
ALT SERPL-CCNC: 23 U/L
ANION GAP SERPL CALC-SCNC: 7 MMOL/L (ref 0–18)
AST SERPL-CCNC: 12 U/L (ref 15–37)
BASOPHILS # BLD AUTO: 0.07 X10(3) UL (ref 0–0.2)
BASOPHILS NFR BLD AUTO: 0.7 %
BILIRUB SERPL-MCNC: 0.3 MG/DL (ref 0.1–2)
BUN BLD-MCNC: 18 MG/DL (ref 7–18)
BUN/CREAT SERPL: 19.6 (ref 10–20)
CALCIUM BLD-MCNC: 9.2 MG/DL (ref 8.5–10.1)
CHLORIDE SERPL-SCNC: 103 MMOL/L (ref 98–112)
CK SERPL-CCNC: 59 U/L
CO2 SERPL-SCNC: 25 MMOL/L (ref 21–32)
CREAT BLD-MCNC: 0.92 MG/DL
CRP SERPL-MCNC: 0.42 MG/DL (ref ?–0.3)
DEPRECATED RDW RBC AUTO: 41.5 FL (ref 35.1–46.3)
EOSINOPHIL # BLD AUTO: 0.18 X10(3) UL (ref 0–0.7)
EOSINOPHIL NFR BLD AUTO: 1.7 %
ERYTHROCYTE [DISTWIDTH] IN BLOOD BY AUTOMATED COUNT: 13.1 % (ref 11–15)
GLOBULIN PLAS-MCNC: 4.8 G/DL (ref 2.8–4.4)
GLUCOSE BLD-MCNC: 245 MG/DL (ref 70–99)
HCT VFR BLD AUTO: 43.6 %
HGB BLD-MCNC: 14.8 G/DL
IMM GRANULOCYTES # BLD AUTO: 0.04 X10(3) UL (ref 0–1)
IMM GRANULOCYTES NFR BLD: 0.4 %
LYMPHOCYTES # BLD AUTO: 3.19 X10(3) UL (ref 1–4)
LYMPHOCYTES NFR BLD AUTO: 30.6 %
M PROTEIN MFR SERPL ELPH: 8.7 G/DL (ref 6.4–8.2)
MCH RBC QN AUTO: 29.8 PG (ref 26–34)
MCHC RBC AUTO-ENTMCNC: 33.9 G/DL (ref 31–37)
MCV RBC AUTO: 87.7 FL
MONOCYTES # BLD AUTO: 0.88 X10(3) UL (ref 0.1–1)
MONOCYTES NFR BLD AUTO: 8.4 %
NEUTROPHILS # BLD AUTO: 6.08 X10 (3) UL (ref 1.5–7.7)
NEUTROPHILS # BLD AUTO: 6.08 X10(3) UL (ref 1.5–7.7)
NEUTROPHILS NFR BLD AUTO: 58.2 %
OSMOLALITY SERPL CALC.SUM OF ELEC: 290 MOSM/KG (ref 275–295)
PATIENT FASTING Y/N/NP: NO
PLATELET # BLD AUTO: 169 10(3)UL (ref 150–450)
POTASSIUM SERPL-SCNC: 3.8 MMOL/L (ref 3.5–5.1)
RBC # BLD AUTO: 4.97 X10(6)UL
SODIUM SERPL-SCNC: 135 MMOL/L (ref 136–145)
WBC # BLD AUTO: 10.4 X10(3) UL (ref 4–11)

## 2021-08-30 PROCEDURE — 86140 C-REACTIVE PROTEIN: CPT

## 2021-08-30 PROCEDURE — 82550 ASSAY OF CK (CPK): CPT | Performed by: INTERNAL MEDICINE

## 2021-08-30 PROCEDURE — 80053 COMPREHEN METABOLIC PANEL: CPT

## 2021-08-30 PROCEDURE — 85025 COMPLETE CBC W/AUTO DIFF WBC: CPT

## 2021-08-30 PROCEDURE — 96375 TX/PRO/DX INJ NEW DRUG ADDON: CPT

## 2021-08-30 PROCEDURE — 99024 POSTOP FOLLOW-UP VISIT: CPT | Performed by: PODIATRIST

## 2021-08-30 PROCEDURE — 96365 THER/PROPH/DIAG IV INF INIT: CPT

## 2021-08-30 PROCEDURE — A4216 STERILE WATER/SALINE, 10 ML: HCPCS | Performed by: INTERNAL MEDICINE

## 2021-08-30 RX ORDER — 0.9 % SODIUM CHLORIDE 0.9 %
INTRAVENOUS SOLUTION INTRAVENOUS
Status: DISCONTINUED
Start: 2021-08-30 | End: 2021-08-30

## 2021-08-30 RX ORDER — ERTAPENEM 1 G/1
INJECTION, POWDER, LYOPHILIZED, FOR SOLUTION INTRAMUSCULAR; INTRAVENOUS
Status: DISCONTINUED
Start: 2021-08-30 | End: 2021-08-30

## 2021-08-30 NOTE — TELEPHONE ENCOUNTER
Benito from [address] [place] [address] left a voicemail requesting the last office visit note from office visit with Dr Darylene Shouts to get faxed to [phone], call back number for Charles Butt is [phone].  No auth on file to release information to [facility]. Spoke with pt and informed

## 2021-08-30 NOTE — PROGRESS NOTES
[facility], [facility] Podiatry  Progress Note    Gabriela Treviño is a 49year old male. Patient presents with:  Post-Op: Sx, 7/22. R foot, great toe amputation. BS this morning was 142. Ongoing soreness and tingling on tip of toes. Pain scale 5/10.          HPI: right foot ([address]    • Heart attack [facility])       Past Surgical History:   Procedure Laterality Date   • OTHER SURGICAL HISTORY Right 04/2021    hallux amputation      Family History   Problem Relation Age of Onset   • Cancer Mother       Social History    So with necrosis:      Neurological Examination:  Light touch sensation intact Right foot  Musculoskeletal Examination:  Muscle Strength is 5/5.   Right partial 1st ray amputation   POP to right foot     Severe HDS right foot 2-5    LABS & IMAGING:     Lab Re wound was performed to the level of and including subcutaneous tissue with #15 blade, dermal curette, or moistened gauze as listed below. Pt tolerated debridement well. Granular tissue/wound base was achieved with healthy bleeding noted.  Bleeding was contr proximal 1st metatarsal with no definite osteomyelitis identified. RTC 1 week, if no improvement will have patient admitted to [address] [place] [address] for probable TMA      No follow-ups on file.     Venecia Doshi DPM  8/30/21

## 2021-08-31 ENCOUNTER — NURSE ONLY (OUTPATIENT)
Dept: HEMATOLOGY/ONCOLOGY | Facility: HOSPITAL | Age: 51
End: 2021-08-31
Attending: INTERNAL MEDICINE

## 2021-08-31 VITALS
SYSTOLIC BLOOD PRESSURE: 117 MMHG | HEART RATE: 92 BPM | RESPIRATION RATE: 16 BRPM | OXYGEN SATURATION: 100 % | DIASTOLIC BLOOD PRESSURE: 67 MMHG | TEMPERATURE: 98 F

## 2021-08-31 DIAGNOSIS — M86.171 ACUTE OSTEOMYELITIS OF METATARSAL BONE OF RIGHT FOOT (HCC): Primary | ICD-10-CM

## 2021-08-31 PROCEDURE — 96365 THER/PROPH/DIAG IV INF INIT: CPT

## 2021-08-31 PROCEDURE — 96375 TX/PRO/DX INJ NEW DRUG ADDON: CPT

## 2021-08-31 PROCEDURE — A4216 STERILE WATER/SALINE, 10 ML: HCPCS | Performed by: INTERNAL MEDICINE

## 2021-08-31 RX ORDER — ERTAPENEM 1 G/1
INJECTION, POWDER, LYOPHILIZED, FOR SOLUTION INTRAMUSCULAR; INTRAVENOUS
Status: DISCONTINUED
Start: 2021-08-31 | End: 2021-08-31

## 2021-08-31 RX ORDER — 0.9 % SODIUM CHLORIDE 0.9 %
INTRAVENOUS SOLUTION INTRAVENOUS
Status: DISCONTINUED
Start: 2021-08-31 | End: 2021-08-31

## 2021-08-31 NOTE — TELEPHONE ENCOUNTER
Returned pt's call, pt states he spoke with his  and it is ok to fax last office visit note to Shaheed Wilkins with [facility]. Progress notes faxed to alyssa Cárdenas, at [phone], confirmation received.

## 2021-08-31 NOTE — PROGRESS NOTES
Patient arrives for daily antibiotics for cellulitis of right foot. Walking boot present to right foot. Denies pain, fever or chills. Received after ID visit. Surgery not needed at this time. Per patient, incision is healing. TX extended 1 week.  Infusion

## 2021-09-01 ENCOUNTER — NURSE ONLY (OUTPATIENT)
Dept: HEMATOLOGY/ONCOLOGY | Facility: HOSPITAL | Age: 51
End: 2021-09-01
Attending: INTERNAL MEDICINE

## 2021-09-01 DIAGNOSIS — M86.171 ACUTE OSTEOMYELITIS OF METATARSAL BONE OF RIGHT FOOT (HCC): Primary | ICD-10-CM

## 2021-09-01 PROCEDURE — 96365 THER/PROPH/DIAG IV INF INIT: CPT

## 2021-09-01 PROCEDURE — 96375 TX/PRO/DX INJ NEW DRUG ADDON: CPT

## 2021-09-01 PROCEDURE — A4216 STERILE WATER/SALINE, 10 ML: HCPCS | Performed by: NURSE PRACTITIONER

## 2021-09-01 RX ORDER — 0.9 % SODIUM CHLORIDE 0.9 %
INTRAVENOUS SOLUTION INTRAVENOUS
Status: DISCONTINUED
Start: 2021-09-01 | End: 2021-09-01

## 2021-09-01 RX ORDER — ERTAPENEM 1 G/1
INJECTION, POWDER, LYOPHILIZED, FOR SOLUTION INTRAMUSCULAR; INTRAVENOUS
Status: DISCONTINUED
Start: 2021-09-01 | End: 2021-09-01

## 2021-09-01 NOTE — PROGRESS NOTES
Patient arrives for daily antibiotics for cellulitis of right foot. Walking boot present to right foot. States pain today 7/10 due to walking dogs today. PICC line present to left upper arm, PICC flushed with saline, positive blood return noted.      Dapto

## 2021-09-02 ENCOUNTER — NURSE ONLY (OUTPATIENT)
Dept: HEMATOLOGY/ONCOLOGY | Facility: HOSPITAL | Age: 51
End: 2021-09-02
Attending: INTERNAL MEDICINE

## 2021-09-02 VITALS
SYSTOLIC BLOOD PRESSURE: 106 MMHG | TEMPERATURE: 97 F | OXYGEN SATURATION: 99 % | HEART RATE: 87 BPM | RESPIRATION RATE: 16 BRPM | DIASTOLIC BLOOD PRESSURE: 70 MMHG

## 2021-09-02 DIAGNOSIS — M86.171 ACUTE OSTEOMYELITIS OF METATARSAL BONE OF RIGHT FOOT (HCC): Primary | ICD-10-CM

## 2021-09-02 PROCEDURE — A4216 STERILE WATER/SALINE, 10 ML: HCPCS | Performed by: NURSE PRACTITIONER

## 2021-09-02 PROCEDURE — 96365 THER/PROPH/DIAG IV INF INIT: CPT

## 2021-09-02 PROCEDURE — 96375 TX/PRO/DX INJ NEW DRUG ADDON: CPT

## 2021-09-02 RX ORDER — ERTAPENEM 1 G/1
INJECTION, POWDER, LYOPHILIZED, FOR SOLUTION INTRAMUSCULAR; INTRAVENOUS
Status: DISCONTINUED
Start: 2021-09-02 | End: 2021-09-02

## 2021-09-02 RX ORDER — 0.9 % SODIUM CHLORIDE 0.9 %
INTRAVENOUS SOLUTION INTRAVENOUS
Status: DISCONTINUED
Start: 2021-09-02 | End: 2021-09-02

## 2021-09-02 NOTE — PROGRESS NOTES
Patient arrives for daily antibiotics for cellulitis of right foot. Walking boot present to right foot. States pain today 3/10. PICC line present to left upper arm, PICC flushed with saline, positive blood return noted.      Dapto given over two minutes, p

## 2021-09-03 ENCOUNTER — NURSE ONLY (OUTPATIENT)
Dept: HEMATOLOGY/ONCOLOGY | Facility: HOSPITAL | Age: 51
End: 2021-09-03
Attending: INTERNAL MEDICINE

## 2021-09-03 VITALS
HEART RATE: 92 BPM | RESPIRATION RATE: 16 BRPM | TEMPERATURE: 97 F | DIASTOLIC BLOOD PRESSURE: 79 MMHG | SYSTOLIC BLOOD PRESSURE: 109 MMHG

## 2021-09-03 DIAGNOSIS — M86.171 ACUTE OSTEOMYELITIS OF METATARSAL BONE OF RIGHT FOOT (HCC): Primary | ICD-10-CM

## 2021-09-03 PROCEDURE — 96375 TX/PRO/DX INJ NEW DRUG ADDON: CPT

## 2021-09-03 PROCEDURE — 96365 THER/PROPH/DIAG IV INF INIT: CPT

## 2021-09-03 PROCEDURE — A4216 STERILE WATER/SALINE, 10 ML: HCPCS | Performed by: NURSE PRACTITIONER

## 2021-09-03 RX ORDER — 0.9 % SODIUM CHLORIDE 0.9 %
INTRAVENOUS SOLUTION INTRAVENOUS
Status: DISCONTINUED
Start: 2021-09-03 | End: 2021-09-03

## 2021-09-03 RX ORDER — ERTAPENEM 1 G/1
INJECTION, POWDER, LYOPHILIZED, FOR SOLUTION INTRAMUSCULAR; INTRAVENOUS
Status: DISCONTINUED
Start: 2021-09-03 | End: 2021-09-03

## 2021-09-03 NOTE — PROGRESS NOTES
Pt here for daily abx. No Complaints. PICC dressing changed using sterile technique. Appeared to tolerate treatment well. No S/S of adverse rxn noted at this time. Discharged to [facility] in stable condition.     FU: ID on 9/8  Last abx day: 9/8

## 2021-09-04 ENCOUNTER — NURSE ONLY (OUTPATIENT)
Dept: HEMATOLOGY/ONCOLOGY | Facility: HOSPITAL | Age: 51
End: 2021-09-04
Attending: INTERNAL MEDICINE

## 2021-09-04 VITALS
RESPIRATION RATE: 16 BRPM | HEART RATE: 85 BPM | SYSTOLIC BLOOD PRESSURE: 107 MMHG | DIASTOLIC BLOOD PRESSURE: 64 MMHG | OXYGEN SATURATION: 100 % | TEMPERATURE: 98 F

## 2021-09-04 DIAGNOSIS — M86.171 ACUTE OSTEOMYELITIS OF METATARSAL BONE OF RIGHT FOOT (HCC): Primary | ICD-10-CM

## 2021-09-04 PROCEDURE — 96375 TX/PRO/DX INJ NEW DRUG ADDON: CPT

## 2021-09-04 PROCEDURE — 96365 THER/PROPH/DIAG IV INF INIT: CPT

## 2021-09-04 PROCEDURE — A4216 STERILE WATER/SALINE, 10 ML: HCPCS | Performed by: NURSE PRACTITIONER

## 2021-09-04 RX ORDER — ERTAPENEM 1 G/1
INJECTION, POWDER, LYOPHILIZED, FOR SOLUTION INTRAMUSCULAR; INTRAVENOUS
Status: DISCONTINUED
Start: 2021-09-04 | End: 2021-09-04

## 2021-09-04 RX ORDER — 0.9 % SODIUM CHLORIDE 0.9 %
INTRAVENOUS SOLUTION INTRAVENOUS
Status: DISCONTINUED
Start: 2021-09-04 | End: 2021-09-04

## 2021-09-04 NOTE — PROGRESS NOTES
Patient arrives for daily antibiotics for cellulitis of right foot. Walking boot present to right foot. States he is improving every day. PICC line present to left upper arm, PICC flushed with saline, positive blood return noted.      Dapto given over two

## 2021-09-05 ENCOUNTER — TELEPHONE (OUTPATIENT)
Dept: HEMATOLOGY/ONCOLOGY | Facility: HOSPITAL | Age: 51
End: 2021-09-05

## 2021-09-05 ENCOUNTER — NURSE ONLY (OUTPATIENT)
Dept: HEMATOLOGY/ONCOLOGY | Facility: HOSPITAL | Age: 51
End: 2021-09-05
Attending: INTERNAL MEDICINE

## 2021-09-05 DIAGNOSIS — M86.171 ACUTE OSTEOMYELITIS OF METATARSAL BONE OF RIGHT FOOT (HCC): Primary | ICD-10-CM

## 2021-09-05 PROCEDURE — A4216 STERILE WATER/SALINE, 10 ML: HCPCS | Performed by: NURSE PRACTITIONER

## 2021-09-05 RX ORDER — ERTAPENEM 1 G/1
INJECTION, POWDER, LYOPHILIZED, FOR SOLUTION INTRAMUSCULAR; INTRAVENOUS
Status: DISPENSED
Start: 2021-09-05 | End: 2021-09-05

## 2021-09-05 RX ORDER — 0.9 % SODIUM CHLORIDE 0.9 %
INTRAVENOUS SOLUTION INTRAVENOUS
Status: DISPENSED
Start: 2021-09-05 | End: 2021-09-05

## 2021-09-06 ENCOUNTER — NURSE ONLY (OUTPATIENT)
Dept: HEMATOLOGY/ONCOLOGY | Facility: HOSPITAL | Age: 51
End: 2021-09-06
Attending: INTERNAL MEDICINE

## 2021-09-06 VITALS
DIASTOLIC BLOOD PRESSURE: 68 MMHG | HEART RATE: 89 BPM | RESPIRATION RATE: 16 BRPM | TEMPERATURE: 97 F | OXYGEN SATURATION: 100 % | SYSTOLIC BLOOD PRESSURE: 106 MMHG

## 2021-09-06 DIAGNOSIS — M86.171 ACUTE OSTEOMYELITIS OF METATARSAL BONE OF RIGHT FOOT (HCC): Primary | ICD-10-CM

## 2021-09-06 LAB
ALBUMIN SERPL-MCNC: 3.5 G/DL (ref 3.4–5)
ALBUMIN/GLOB SERPL: 0.8 {RATIO} (ref 1–2)
ALP LIVER SERPL-CCNC: 184 U/L
ALT SERPL-CCNC: 22 U/L
ANION GAP SERPL CALC-SCNC: 7 MMOL/L (ref 0–18)
AST SERPL-CCNC: 17 U/L (ref 15–37)
BASOPHILS # BLD AUTO: 0.04 X10(3) UL (ref 0–0.2)
BASOPHILS NFR BLD AUTO: 0.4 %
BILIRUB SERPL-MCNC: 0.4 MG/DL (ref 0.1–2)
BUN BLD-MCNC: 13 MG/DL (ref 7–18)
BUN/CREAT SERPL: 14.8 (ref 10–20)
CALCIUM BLD-MCNC: 8.6 MG/DL (ref 8.5–10.1)
CHLORIDE SERPL-SCNC: 102 MMOL/L (ref 98–112)
CK SERPL-CCNC: 90 U/L
CO2 SERPL-SCNC: 26 MMOL/L (ref 21–32)
CREAT BLD-MCNC: 0.88 MG/DL
CRP SERPL-MCNC: 0.81 MG/DL (ref ?–0.3)
DEPRECATED RDW RBC AUTO: 41.8 FL (ref 35.1–46.3)
EOSINOPHIL # BLD AUTO: 0.16 X10(3) UL (ref 0–0.7)
EOSINOPHIL NFR BLD AUTO: 1.7 %
ERYTHROCYTE [DISTWIDTH] IN BLOOD BY AUTOMATED COUNT: 12.8 % (ref 11–15)
GLOBULIN PLAS-MCNC: 4.5 G/DL (ref 2.8–4.4)
GLUCOSE BLD-MCNC: 327 MG/DL (ref 70–99)
HCT VFR BLD AUTO: 40.7 %
HGB BLD-MCNC: 13.8 G/DL
IMM GRANULOCYTES # BLD AUTO: 0.02 X10(3) UL (ref 0–1)
IMM GRANULOCYTES NFR BLD: 0.2 %
LYMPHOCYTES # BLD AUTO: 2.72 X10(3) UL (ref 1–4)
LYMPHOCYTES NFR BLD AUTO: 29.2 %
M PROTEIN MFR SERPL ELPH: 8 G/DL (ref 6.4–8.2)
MCH RBC QN AUTO: 30.1 PG (ref 26–34)
MCHC RBC AUTO-ENTMCNC: 33.9 G/DL (ref 31–37)
MCV RBC AUTO: 88.9 FL
MONOCYTES # BLD AUTO: 0.93 X10(3) UL (ref 0.1–1)
MONOCYTES NFR BLD AUTO: 10 %
NEUTROPHILS # BLD AUTO: 5.45 X10 (3) UL (ref 1.5–7.7)
NEUTROPHILS # BLD AUTO: 5.45 X10(3) UL (ref 1.5–7.7)
NEUTROPHILS NFR BLD AUTO: 58.5 %
OSMOLALITY SERPL CALC.SUM OF ELEC: 293 MOSM/KG (ref 275–295)
PATIENT FASTING Y/N/NP: NO
PLATELET # BLD AUTO: 168 10(3)UL (ref 150–450)
POTASSIUM SERPL-SCNC: 3.8 MMOL/L (ref 3.5–5.1)
RBC # BLD AUTO: 4.58 X10(6)UL
SODIUM SERPL-SCNC: 135 MMOL/L (ref 136–145)
WBC # BLD AUTO: 9.3 X10(3) UL (ref 4–11)

## 2021-09-06 PROCEDURE — 86140 C-REACTIVE PROTEIN: CPT

## 2021-09-06 PROCEDURE — 82550 ASSAY OF CK (CPK): CPT

## 2021-09-06 PROCEDURE — 80053 COMPREHEN METABOLIC PANEL: CPT

## 2021-09-06 PROCEDURE — 96375 TX/PRO/DX INJ NEW DRUG ADDON: CPT

## 2021-09-06 PROCEDURE — 96365 THER/PROPH/DIAG IV INF INIT: CPT

## 2021-09-06 PROCEDURE — A4216 STERILE WATER/SALINE, 10 ML: HCPCS | Performed by: NURSE PRACTITIONER

## 2021-09-06 PROCEDURE — 85025 COMPLETE CBC W/AUTO DIFF WBC: CPT

## 2021-09-06 RX ORDER — 0.9 % SODIUM CHLORIDE 0.9 %
INTRAVENOUS SOLUTION INTRAVENOUS
Status: DISCONTINUED
Start: 2021-09-06 | End: 2021-09-06

## 2021-09-06 RX ORDER — ERTAPENEM 1 G/1
INJECTION, POWDER, LYOPHILIZED, FOR SOLUTION INTRAMUSCULAR; INTRAVENOUS
Status: DISCONTINUED
Start: 2021-09-06 | End: 2021-09-06

## 2021-09-06 NOTE — PROGRESS NOTES
Patient arrives for daily antibiotics for cellulitis of right foot. Clearnce Marker did not show up for antibiotics yesterday. Walking boot present to right foot. States he is improving every day.   PICC line present to left upper arm, PICC flushed with saline, posi

## 2021-09-07 ENCOUNTER — NURSE ONLY (OUTPATIENT)
Dept: HEMATOLOGY/ONCOLOGY | Facility: HOSPITAL | Age: 51
End: 2021-09-07
Attending: INTERNAL MEDICINE

## 2021-09-07 VITALS
OXYGEN SATURATION: 92 % | DIASTOLIC BLOOD PRESSURE: 79 MMHG | HEART RATE: 92 BPM | RESPIRATION RATE: 16 BRPM | TEMPERATURE: 98 F | SYSTOLIC BLOOD PRESSURE: 108 MMHG

## 2021-09-07 DIAGNOSIS — M86.171 ACUTE OSTEOMYELITIS OF METATARSAL BONE OF RIGHT FOOT (HCC): Primary | ICD-10-CM

## 2021-09-07 PROCEDURE — 96365 THER/PROPH/DIAG IV INF INIT: CPT

## 2021-09-07 PROCEDURE — 96375 TX/PRO/DX INJ NEW DRUG ADDON: CPT

## 2021-09-07 PROCEDURE — A4216 STERILE WATER/SALINE, 10 ML: HCPCS | Performed by: NURSE PRACTITIONER

## 2021-09-07 RX ORDER — 0.9 % SODIUM CHLORIDE 0.9 %
INTRAVENOUS SOLUTION INTRAVENOUS
Status: DISCONTINUED
Start: 2021-09-07 | End: 2021-09-07

## 2021-09-07 RX ORDER — ERTAPENEM 1 G/1
INJECTION, POWDER, LYOPHILIZED, FOR SOLUTION INTRAMUSCULAR; INTRAVENOUS
Status: DISCONTINUED
Start: 2021-09-07 | End: 2021-09-07

## 2021-09-07 NOTE — PROGRESS NOTES
Patient arrives for daily antibiotics for cellulitis of right foot. London Giron did not show up for antibiotics yesterday. Walking boot present to right foot. States he is improving every day.   PICC line present to left upper arm, PICC flushed with saline, posi

## 2021-09-08 ENCOUNTER — OFFICE VISIT (OUTPATIENT)
Dept: PODIATRY CLINIC | Facility: CLINIC | Age: 51
End: 2021-09-08
Payer: OTHER MISCELLANEOUS

## 2021-09-08 ENCOUNTER — NURSE ONLY (OUTPATIENT)
Dept: HEMATOLOGY/ONCOLOGY | Facility: HOSPITAL | Age: 51
End: 2021-09-08
Attending: INTERNAL MEDICINE

## 2021-09-08 ENCOUNTER — TELEPHONE (OUTPATIENT)
Dept: PODIATRY CLINIC | Facility: CLINIC | Age: 51
End: 2021-09-08

## 2021-09-08 DIAGNOSIS — E08.621 DIABETIC ULCER OF OTHER PART OF RIGHT FOOT ASSOCIATED WITH DIABETES MELLITUS DUE TO UNDERLYING CONDITION, WITH FAT LAYER EXPOSED (HCC): Primary | ICD-10-CM

## 2021-09-08 DIAGNOSIS — M79.671 ISCHEMIC PAIN OF FOOT, RIGHT: ICD-10-CM

## 2021-09-08 DIAGNOSIS — I99.8 ISCHEMIC PAIN OF FOOT, RIGHT: ICD-10-CM

## 2021-09-08 DIAGNOSIS — I73.9 PERIPHERAL ARTERIAL DISEASE WITH HISTORY OF REVASCULARIZATION (HCC): ICD-10-CM

## 2021-09-08 DIAGNOSIS — Z98.890 PERIPHERAL ARTERIAL DISEASE WITH HISTORY OF REVASCULARIZATION (HCC): ICD-10-CM

## 2021-09-08 DIAGNOSIS — L97.512 DIABETIC ULCER OF OTHER PART OF RIGHT FOOT ASSOCIATED WITH DIABETES MELLITUS DUE TO UNDERLYING CONDITION, WITH FAT LAYER EXPOSED (HCC): Primary | ICD-10-CM

## 2021-09-08 DIAGNOSIS — M86.171 ACUTE OSTEOMYELITIS OF METATARSAL BONE OF RIGHT FOOT (HCC): Primary | ICD-10-CM

## 2021-09-08 DIAGNOSIS — Z89.411 HISTORY OF PARTIAL RAY AMPUTATION OF RIGHT GREAT TOE (HCC): ICD-10-CM

## 2021-09-08 PROCEDURE — 99024 POSTOP FOLLOW-UP VISIT: CPT | Performed by: PODIATRIST

## 2021-09-08 PROCEDURE — A4216 STERILE WATER/SALINE, 10 ML: HCPCS | Performed by: NURSE PRACTITIONER

## 2021-09-08 NOTE — PROGRESS NOTES
[facility], [facility] Podiatry  Progress Note    Ar Evans is a 49year old male. Patient presents with:  Post-Op: Right foot great toe amputation. Pt states pain 3. Minimal drainage on bandage        HPI:     Ar Evans is a a(n) 49year old male. SURGICAL HISTORY Right 04/2021    hallux amputation      Family History   Problem Relation Age of Onset   • Cancer Mother       Social History    Socioeconomic History      Marital status: Single      Spouse name: Not on file      Number of children: Not o Examination:  Muscle Strength is 5/5.   Right partial 1st ray amputation   POP to right foot     Severe HDS right foot 2-5    LABS & IMAGING:     Lab Results   Component Value Date     (H) 09/06/2021    BUN 13 09/06/2021    CREATSERUM 0.88 09/06/2021 moistened gauze as listed below. Pt tolerated debridement well. Granular tissue/wound base was achieved with healthy bleeding noted. Bleeding was controlled with manual pressure and dry, sterile gauze.  Non-viable tissue removed from wound bed with debridem DPM  9/8/21

## 2021-09-08 NOTE — TELEPHONE ENCOUNTER
Spoke to pt and he has a typo in work note from today with [address] Needs date changed from 08/15/21 to 09/15/21. Faxed note for pt to [phone]  Faxed note as requested.

## 2021-09-08 NOTE — PROGRESS NOTES
Pt here for daily Invanz + dapto. Appeared to tolerate infusion, no s/s of adverse reaction noted. Discharged home ambulating independently.       EOT:9/8   F/U: Alfredo Leyden 9/9

## 2021-09-08 NOTE — TELEPHONE ENCOUNTER
Per pt note for work has type and is needing it changed. Stating the date is incorrect, please fix and fax again.  Please advise

## 2021-09-10 ENCOUNTER — TELEPHONE (OUTPATIENT)
Dept: HEMATOLOGY/ONCOLOGY | Facility: HOSPITAL | Age: 51
End: 2021-09-10

## 2021-09-11 ENCOUNTER — NURSE ONLY (OUTPATIENT)
Dept: HEMATOLOGY/ONCOLOGY | Facility: HOSPITAL | Age: 51
End: 2021-09-11
Attending: INTERNAL MEDICINE

## 2021-09-11 DIAGNOSIS — M86.171 ACUTE OSTEOMYELITIS OF METATARSAL BONE OF RIGHT FOOT (HCC): Primary | ICD-10-CM

## 2021-09-11 PROCEDURE — A4216 STERILE WATER/SALINE, 10 ML: HCPCS | Performed by: NURSE PRACTITIONER

## 2021-09-11 PROCEDURE — 96375 TX/PRO/DX INJ NEW DRUG ADDON: CPT

## 2021-09-11 PROCEDURE — 96374 THER/PROPH/DIAG INJ IV PUSH: CPT

## 2021-09-11 PROCEDURE — 96365 THER/PROPH/DIAG IV INF INIT: CPT

## 2021-09-11 RX ORDER — ERTAPENEM 1 G/1
INJECTION, POWDER, LYOPHILIZED, FOR SOLUTION INTRAMUSCULAR; INTRAVENOUS
Status: COMPLETED
Start: 2021-09-11 | End: 2021-09-11

## 2021-09-11 RX ADMIN — ERTAPENEM 1 G: 1 INJECTION, POWDER, LYOPHILIZED, FOR SOLUTION INTRAMUSCULAR; INTRAVENOUS at 09:44:00

## 2021-09-11 NOTE — PROGRESS NOTES
2Pt here for daily Invanz + dapto. picc dressing changed. Appeared to tolerate infusion, no s/s of adverse reaction noted. Discharged stable      EOT:9/13 has appointment with Daniel Renteria on Monday after  infusion appointment.

## 2021-09-12 ENCOUNTER — NURSE ONLY (OUTPATIENT)
Dept: HEMATOLOGY/ONCOLOGY | Facility: HOSPITAL | Age: 51
End: 2021-09-12
Attending: INTERNAL MEDICINE

## 2021-09-12 VITALS
RESPIRATION RATE: 16 BRPM | SYSTOLIC BLOOD PRESSURE: 110 MMHG | DIASTOLIC BLOOD PRESSURE: 77 MMHG | HEART RATE: 98 BPM | OXYGEN SATURATION: 99 % | TEMPERATURE: 98 F

## 2021-09-12 DIAGNOSIS — M86.171 ACUTE OSTEOMYELITIS OF METATARSAL BONE OF RIGHT FOOT (HCC): Primary | ICD-10-CM

## 2021-09-12 PROCEDURE — 96375 TX/PRO/DX INJ NEW DRUG ADDON: CPT

## 2021-09-12 PROCEDURE — 96365 THER/PROPH/DIAG IV INF INIT: CPT

## 2021-09-12 PROCEDURE — A4216 STERILE WATER/SALINE, 10 ML: HCPCS | Performed by: NURSE PRACTITIONER

## 2021-09-12 RX ORDER — 0.9 % SODIUM CHLORIDE 0.9 %
INTRAVENOUS SOLUTION INTRAVENOUS
Status: DISPENSED
Start: 2021-09-12 | End: 2021-09-12

## 2021-09-12 RX ORDER — ERTAPENEM 1 G/1
INJECTION, POWDER, LYOPHILIZED, FOR SOLUTION INTRAMUSCULAR; INTRAVENOUS
Status: DISPENSED
Start: 2021-09-12 | End: 2021-09-12

## 2021-09-12 NOTE — PROGRESS NOTES
Patient arrives for daily antibiotics for cellulitis of right foot. Walking boot present to right foot and using cane to ambulate. Denies pain. Patient reports that podiatrist is pleased with his progress and would is healing.   Pt did not undergo amputat

## 2021-09-13 ENCOUNTER — NURSE ONLY (OUTPATIENT)
Dept: HEMATOLOGY/ONCOLOGY | Facility: HOSPITAL | Age: 51
End: 2021-09-13
Attending: INTERNAL MEDICINE

## 2021-09-13 VITALS
HEART RATE: 102 BPM | OXYGEN SATURATION: 100 % | SYSTOLIC BLOOD PRESSURE: 110 MMHG | RESPIRATION RATE: 16 BRPM | TEMPERATURE: 98 F | DIASTOLIC BLOOD PRESSURE: 73 MMHG

## 2021-09-13 DIAGNOSIS — M86.171 ACUTE OSTEOMYELITIS OF METATARSAL BONE OF RIGHT FOOT (HCC): Primary | ICD-10-CM

## 2021-09-13 LAB
ALBUMIN SERPL-MCNC: 3.4 G/DL (ref 3.4–5)
ALBUMIN/GLOB SERPL: 0.8 {RATIO} (ref 1–2)
ALP LIVER SERPL-CCNC: 191 U/L
ALT SERPL-CCNC: 19 U/L
ANION GAP SERPL CALC-SCNC: 6 MMOL/L (ref 0–18)
AST SERPL-CCNC: 15 U/L (ref 15–37)
BASOPHILS # BLD AUTO: 0.06 X10(3) UL (ref 0–0.2)
BASOPHILS NFR BLD AUTO: 0.5 %
BILIRUB SERPL-MCNC: 0.3 MG/DL (ref 0.1–2)
BUN BLD-MCNC: 13 MG/DL (ref 7–18)
BUN/CREAT SERPL: 16 (ref 10–20)
CALCIUM BLD-MCNC: 8.4 MG/DL (ref 8.5–10.1)
CHLORIDE SERPL-SCNC: 101 MMOL/L (ref 98–112)
CK SERPL-CCNC: 103 U/L
CO2 SERPL-SCNC: 27 MMOL/L (ref 21–32)
CREAT BLD-MCNC: 0.81 MG/DL
CRP SERPL-MCNC: 1.4 MG/DL (ref ?–0.3)
DEPRECATED RDW RBC AUTO: 40.3 FL (ref 35.1–46.3)
EOSINOPHIL # BLD AUTO: 0.2 X10(3) UL (ref 0–0.7)
EOSINOPHIL NFR BLD AUTO: 1.8 %
ERYTHROCYTE [DISTWIDTH] IN BLOOD BY AUTOMATED COUNT: 12.9 % (ref 11–15)
GLOBULIN PLAS-MCNC: 4.4 G/DL (ref 2.8–4.4)
GLUCOSE BLD-MCNC: 403 MG/DL (ref 70–99)
HCT VFR BLD AUTO: 40.3 %
HGB BLD-MCNC: 13.8 G/DL
IMM GRANULOCYTES # BLD AUTO: 0.05 X10(3) UL (ref 0–1)
IMM GRANULOCYTES NFR BLD: 0.5 %
LYMPHOCYTES # BLD AUTO: 2.38 X10(3) UL (ref 1–4)
LYMPHOCYTES NFR BLD AUTO: 21.5 %
M PROTEIN MFR SERPL ELPH: 7.8 G/DL (ref 6.4–8.2)
MCH RBC QN AUTO: 29.6 PG (ref 26–34)
MCHC RBC AUTO-ENTMCNC: 34.2 G/DL (ref 31–37)
MCV RBC AUTO: 86.5 FL
MONOCYTES # BLD AUTO: 0.71 X10(3) UL (ref 0.1–1)
MONOCYTES NFR BLD AUTO: 6.4 %
NEUTROPHILS # BLD AUTO: 7.68 X10 (3) UL (ref 1.5–7.7)
NEUTROPHILS # BLD AUTO: 7.68 X10(3) UL (ref 1.5–7.7)
NEUTROPHILS NFR BLD AUTO: 69.3 %
OSMOLALITY SERPL CALC.SUM OF ELEC: 295 MOSM/KG (ref 275–295)
PATIENT FASTING Y/N/NP: YES
PLATELET # BLD AUTO: 195 10(3)UL (ref 150–450)
POTASSIUM SERPL-SCNC: 4 MMOL/L (ref 3.5–5.1)
RBC # BLD AUTO: 4.66 X10(6)UL
SODIUM SERPL-SCNC: 134 MMOL/L (ref 136–145)
WBC # BLD AUTO: 11.1 X10(3) UL (ref 4–11)

## 2021-09-13 PROCEDURE — 80053 COMPREHEN METABOLIC PANEL: CPT

## 2021-09-13 PROCEDURE — A4216 STERILE WATER/SALINE, 10 ML: HCPCS | Performed by: NURSE PRACTITIONER

## 2021-09-13 PROCEDURE — 82550 ASSAY OF CK (CPK): CPT

## 2021-09-13 PROCEDURE — 85025 COMPLETE CBC W/AUTO DIFF WBC: CPT

## 2021-09-13 PROCEDURE — 86140 C-REACTIVE PROTEIN: CPT

## 2021-09-13 PROCEDURE — 96375 TX/PRO/DX INJ NEW DRUG ADDON: CPT

## 2021-09-13 PROCEDURE — 96365 THER/PROPH/DIAG IV INF INIT: CPT

## 2021-09-13 RX ORDER — 0.9 % SODIUM CHLORIDE 0.9 %
INTRAVENOUS SOLUTION INTRAVENOUS
Status: DISCONTINUED
Start: 2021-09-13 | End: 2021-09-13

## 2021-09-13 RX ORDER — ERTAPENEM 1 G/1
INJECTION, POWDER, LYOPHILIZED, FOR SOLUTION INTRAMUSCULAR; INTRAVENOUS
Status: DISCONTINUED
Start: 2021-09-13 | End: 2021-09-13

## 2021-09-13 NOTE — PROGRESS NOTES
Patient arrives for daily antibiotics for cellulitis of right foot. HOMER Mixon boot present to right foot. States he is improving every day. PICC line present to left upper arm, PICC flushed with saline, positive blood return noted.  Ordered labs drawn and

## 2021-09-14 ENCOUNTER — NURSE ONLY (OUTPATIENT)
Dept: HEMATOLOGY/ONCOLOGY | Facility: HOSPITAL | Age: 51
End: 2021-09-14
Attending: INTERNAL MEDICINE

## 2021-09-14 VITALS
RESPIRATION RATE: 16 BRPM | HEART RATE: 115 BPM | OXYGEN SATURATION: 99 % | DIASTOLIC BLOOD PRESSURE: 78 MMHG | SYSTOLIC BLOOD PRESSURE: 132 MMHG | TEMPERATURE: 98 F

## 2021-09-14 DIAGNOSIS — M86.171 ACUTE OSTEOMYELITIS OF METATARSAL BONE OF RIGHT FOOT (HCC): Primary | ICD-10-CM

## 2021-09-14 PROCEDURE — A4216 STERILE WATER/SALINE, 10 ML: HCPCS | Performed by: PHYSICIAN ASSISTANT

## 2021-09-14 PROCEDURE — 96365 THER/PROPH/DIAG IV INF INIT: CPT

## 2021-09-14 PROCEDURE — 96375 TX/PRO/DX INJ NEW DRUG ADDON: CPT

## 2021-09-14 RX ORDER — ERTAPENEM 1 G/1
INJECTION, POWDER, LYOPHILIZED, FOR SOLUTION INTRAMUSCULAR; INTRAVENOUS
Status: DISCONTINUED
Start: 2021-09-14 | End: 2021-09-14

## 2021-09-14 RX ORDER — 0.9 % SODIUM CHLORIDE 0.9 %
INTRAVENOUS SOLUTION INTRAVENOUS
Status: DISCONTINUED
Start: 2021-09-14 | End: 2021-09-14

## 2021-09-14 NOTE — PROGRESS NOTES
Patient arrives for daily antibiotics for cellulitis of right foot. Walking boot present to right foot. States pain today 4/10 today. PICC line present to left upper arm, PICC flushed with saline, positive blood return noted.      Dapto given over two jemal

## 2021-09-15 ENCOUNTER — NURSE ONLY (OUTPATIENT)
Dept: HEMATOLOGY/ONCOLOGY | Facility: HOSPITAL | Age: 51
End: 2021-09-15
Attending: INTERNAL MEDICINE

## 2021-09-15 ENCOUNTER — OFFICE VISIT (OUTPATIENT)
Dept: PODIATRY CLINIC | Facility: CLINIC | Age: 51
End: 2021-09-15
Payer: OTHER MISCELLANEOUS

## 2021-09-15 ENCOUNTER — TELEPHONE (OUTPATIENT)
Dept: HEMATOLOGY/ONCOLOGY | Facility: HOSPITAL | Age: 51
End: 2021-09-15

## 2021-09-15 ENCOUNTER — TELEPHONE (OUTPATIENT)
Dept: PODIATRY CLINIC | Facility: CLINIC | Age: 51
End: 2021-09-15

## 2021-09-15 VITALS
SYSTOLIC BLOOD PRESSURE: 105 MMHG | TEMPERATURE: 98 F | RESPIRATION RATE: 16 BRPM | HEART RATE: 112 BPM | OXYGEN SATURATION: 99 % | DIASTOLIC BLOOD PRESSURE: 72 MMHG

## 2021-09-15 DIAGNOSIS — Z89.411 HISTORY OF PARTIAL RAY AMPUTATION OF RIGHT GREAT TOE (HCC): Primary | ICD-10-CM

## 2021-09-15 DIAGNOSIS — I99.8 ISCHEMIC PAIN OF FOOT, RIGHT: ICD-10-CM

## 2021-09-15 DIAGNOSIS — M79.671 ISCHEMIC PAIN OF FOOT, RIGHT: ICD-10-CM

## 2021-09-15 DIAGNOSIS — Z98.890 PERIPHERAL ARTERIAL DISEASE WITH HISTORY OF REVASCULARIZATION (HCC): ICD-10-CM

## 2021-09-15 DIAGNOSIS — E11.65 UNCONTROLLED TYPE 2 DIABETES MELLITUS WITH HYPERGLYCEMIA (HCC): ICD-10-CM

## 2021-09-15 DIAGNOSIS — I73.9 PERIPHERAL ARTERIAL DISEASE WITH HISTORY OF REVASCULARIZATION (HCC): ICD-10-CM

## 2021-09-15 DIAGNOSIS — M86.171 ACUTE OSTEOMYELITIS OF METATARSAL BONE OF RIGHT FOOT (HCC): Primary | ICD-10-CM

## 2021-09-15 PROCEDURE — A4216 STERILE WATER/SALINE, 10 ML: HCPCS | Performed by: PHYSICIAN ASSISTANT

## 2021-09-15 PROCEDURE — 96375 TX/PRO/DX INJ NEW DRUG ADDON: CPT

## 2021-09-15 PROCEDURE — 99213 OFFICE O/P EST LOW 20 MIN: CPT | Performed by: PODIATRIST

## 2021-09-15 PROCEDURE — 96365 THER/PROPH/DIAG IV INF INIT: CPT

## 2021-09-15 RX ORDER — ERTAPENEM 1 G/1
INJECTION, POWDER, LYOPHILIZED, FOR SOLUTION INTRAMUSCULAR; INTRAVENOUS
Status: DISCONTINUED
Start: 2021-09-15 | End: 2021-09-15

## 2021-09-15 RX ORDER — 0.9 % SODIUM CHLORIDE 0.9 %
INTRAVENOUS SOLUTION INTRAVENOUS
Status: DISCONTINUED
Start: 2021-09-15 | End: 2021-09-15

## 2021-09-15 NOTE — TELEPHONE ENCOUNTER
Called patient for missed infusion appt this morning. Patient states he was unaware of his infusion time. He will arrive later this morning.

## 2021-09-15 NOTE — PROGRESS NOTES
[facility], [facility] Podiatry  Progress Note    Peng Marvin is a 49year old male. Patient presents with:  Wound Recheck: 1 wk f/u on diabetic ulcer of right great toe amputation. 7/20/21 a1c: 7.1. morning sugar: 183.  Rates pain 3/10. denies any fever/chill right foot ([address]    • Heart attack [facility])       Past Surgical History:   Procedure Laterality Date   • OTHER SURGICAL HISTORY Right 04/2021    hallux amputation      Family History   Problem Relation Age of Onset   • Cancer Mother       Social History    So amputation   POP to right foot     Severe HDS right foot 2-5    LABS & IMAGING:     Lab Results   Component Value Date     (H) 09/13/2021    BUN 13 09/13/2021    CREATSERUM 0.81 09/13/2021    BUNCREA 16.0 09/13/2021    ANIONGAP 6 09/13/2021    GFRAA increased CRP and WBC     Clearance margin negative for osteomyelitis    Pt saw endocrinologist and increased glipizide    X rays Right foot: 8/25/21  CONCLUSION:   1.  Status post additional amputation through the proximal 1st metatarsal with no definite o

## 2021-09-15 NOTE — PROGRESS NOTES
Patient arrives for daily antibiotics for cellulitis of right foot. Walking boot present to right foot. PICC line present to left upper arm, PICC flushed with saline, positive blood return noted. Dapto given over two minutes, patient tolerated well.  In

## 2021-09-16 ENCOUNTER — NURSE ONLY (OUTPATIENT)
Dept: HEMATOLOGY/ONCOLOGY | Facility: HOSPITAL | Age: 51
End: 2021-09-16
Attending: INTERNAL MEDICINE

## 2021-09-16 VITALS
DIASTOLIC BLOOD PRESSURE: 68 MMHG | RESPIRATION RATE: 16 BRPM | OXYGEN SATURATION: 99 % | SYSTOLIC BLOOD PRESSURE: 119 MMHG | TEMPERATURE: 98 F | HEART RATE: 92 BPM

## 2021-09-16 DIAGNOSIS — M86.171 ACUTE OSTEOMYELITIS OF METATARSAL BONE OF RIGHT FOOT (HCC): Primary | ICD-10-CM

## 2021-09-16 PROCEDURE — 96375 TX/PRO/DX INJ NEW DRUG ADDON: CPT

## 2021-09-16 PROCEDURE — 96365 THER/PROPH/DIAG IV INF INIT: CPT

## 2021-09-16 RX ORDER — ERTAPENEM 1 G/1
INJECTION, POWDER, LYOPHILIZED, FOR SOLUTION INTRAMUSCULAR; INTRAVENOUS
Status: DISCONTINUED
Start: 2021-09-16 | End: 2021-09-16

## 2021-09-16 RX ORDER — 0.9 % SODIUM CHLORIDE 0.9 %
INTRAVENOUS SOLUTION INTRAVENOUS
Status: DISCONTINUED
Start: 2021-09-16 | End: 2021-09-16

## 2021-09-17 ENCOUNTER — TELEPHONE (OUTPATIENT)
Dept: HEMATOLOGY/ONCOLOGY | Facility: HOSPITAL | Age: 51
End: 2021-09-17

## 2021-09-18 ENCOUNTER — NURSE ONLY (OUTPATIENT)
Dept: HEMATOLOGY/ONCOLOGY | Facility: HOSPITAL | Age: 51
End: 2021-09-18
Attending: INTERNAL MEDICINE

## 2021-09-18 VITALS
HEART RATE: 98 BPM | SYSTOLIC BLOOD PRESSURE: 110 MMHG | DIASTOLIC BLOOD PRESSURE: 68 MMHG | OXYGEN SATURATION: 100 % | RESPIRATION RATE: 16 BRPM | TEMPERATURE: 97 F

## 2021-09-18 DIAGNOSIS — M86.171 ACUTE OSTEOMYELITIS OF METATARSAL BONE OF RIGHT FOOT (HCC): Primary | ICD-10-CM

## 2021-09-18 PROCEDURE — 96365 THER/PROPH/DIAG IV INF INIT: CPT

## 2021-09-18 PROCEDURE — 96375 TX/PRO/DX INJ NEW DRUG ADDON: CPT

## 2021-09-18 RX ORDER — 0.9 % SODIUM CHLORIDE 0.9 %
INTRAVENOUS SOLUTION INTRAVENOUS
Status: DISCONTINUED
Start: 2021-09-18 | End: 2021-09-18

## 2021-09-18 RX ORDER — ERTAPENEM 1 G/1
INJECTION, POWDER, LYOPHILIZED, FOR SOLUTION INTRAMUSCULAR; INTRAVENOUS
Status: DISCONTINUED
Start: 2021-09-18 | End: 2021-09-18

## 2021-09-18 NOTE — PROGRESS NOTES
Patient arrives for daily antibiotics for cellulitis of right foot. Walking boot present to right foot. PICC dressing changed. Dapto given over two minutes, patient tolerated well.  Invanz given over thirty minutes, patient tolerated well with no compl

## 2021-09-19 ENCOUNTER — NURSE ONLY (OUTPATIENT)
Dept: HEMATOLOGY/ONCOLOGY | Facility: HOSPITAL | Age: 51
End: 2021-09-19
Attending: INTERNAL MEDICINE

## 2021-09-19 VITALS
RESPIRATION RATE: 16 BRPM | OXYGEN SATURATION: 100 % | TEMPERATURE: 98 F | HEART RATE: 113 BPM | DIASTOLIC BLOOD PRESSURE: 81 MMHG | SYSTOLIC BLOOD PRESSURE: 131 MMHG

## 2021-09-19 DIAGNOSIS — M86.171 ACUTE OSTEOMYELITIS OF METATARSAL BONE OF RIGHT FOOT (HCC): Primary | ICD-10-CM

## 2021-09-19 PROCEDURE — 96365 THER/PROPH/DIAG IV INF INIT: CPT

## 2021-09-19 PROCEDURE — 96375 TX/PRO/DX INJ NEW DRUG ADDON: CPT

## 2021-09-19 RX ORDER — 0.9 % SODIUM CHLORIDE 0.9 %
INTRAVENOUS SOLUTION INTRAVENOUS
Status: DISCONTINUED
Start: 2021-09-19 | End: 2021-09-19

## 2021-09-19 RX ORDER — ERTAPENEM 1 G/1
INJECTION, POWDER, LYOPHILIZED, FOR SOLUTION INTRAMUSCULAR; INTRAVENOUS
Status: DISCONTINUED
Start: 2021-09-19 | End: 2021-09-19

## 2021-09-19 NOTE — PROGRESS NOTES
Patient arrives for daily antibiotics for cellulitis of right foot. Walking boot present to right foot. Pain at about a 7 today, reports swelling in foot. Dressing intact, only to be changed by podiatry. No swelling noted in ankle.   Encouraged to keep fo

## 2021-09-20 ENCOUNTER — NURSE ONLY (OUTPATIENT)
Dept: HEMATOLOGY/ONCOLOGY | Facility: HOSPITAL | Age: 51
End: 2021-09-20
Attending: INTERNAL MEDICINE

## 2021-09-20 DIAGNOSIS — M86.171 ACUTE OSTEOMYELITIS OF METATARSAL BONE OF RIGHT FOOT (HCC): Primary | ICD-10-CM

## 2021-09-21 ENCOUNTER — APPOINTMENT (OUTPATIENT)
Dept: HEMATOLOGY/ONCOLOGY | Facility: HOSPITAL | Age: 51
End: 2021-09-21
Attending: INTERNAL MEDICINE

## 2021-09-22 ENCOUNTER — OFFICE VISIT (OUTPATIENT)
Dept: PODIATRY CLINIC | Facility: CLINIC | Age: 51
End: 2021-09-22
Payer: OTHER MISCELLANEOUS

## 2021-09-22 DIAGNOSIS — I99.8 ISCHEMIC PAIN OF FOOT, RIGHT: ICD-10-CM

## 2021-09-22 DIAGNOSIS — I73.9 PERIPHERAL ARTERIAL DISEASE WITH HISTORY OF REVASCULARIZATION (HCC): ICD-10-CM

## 2021-09-22 DIAGNOSIS — Z89.411 HISTORY OF PARTIAL RAY AMPUTATION OF RIGHT GREAT TOE (HCC): Primary | ICD-10-CM

## 2021-09-22 DIAGNOSIS — L97.512 DIABETIC ULCER OF OTHER PART OF RIGHT FOOT ASSOCIATED WITH DIABETES MELLITUS DUE TO UNDERLYING CONDITION, WITH FAT LAYER EXPOSED (HCC): ICD-10-CM

## 2021-09-22 DIAGNOSIS — M79.671 ISCHEMIC PAIN OF FOOT, RIGHT: ICD-10-CM

## 2021-09-22 DIAGNOSIS — E08.621 DIABETIC ULCER OF OTHER PART OF RIGHT FOOT ASSOCIATED WITH DIABETES MELLITUS DUE TO UNDERLYING CONDITION, WITH FAT LAYER EXPOSED (HCC): ICD-10-CM

## 2021-09-22 DIAGNOSIS — E11.65 UNCONTROLLED TYPE 2 DIABETES MELLITUS WITH HYPERGLYCEMIA (HCC): ICD-10-CM

## 2021-09-22 DIAGNOSIS — Z98.890 PERIPHERAL ARTERIAL DISEASE WITH HISTORY OF REVASCULARIZATION (HCC): ICD-10-CM

## 2021-09-22 PROCEDURE — 99212 OFFICE O/P EST SF 10 MIN: CPT | Performed by: PODIATRIST

## 2021-09-22 NOTE — PROGRESS NOTES
[facility] Podiatry  Progress Note    Nathaniel Lamar is a 49year old male. Patient presents with: Follow - Up: Right foot wound, denies pain at the moment        HPI:     Nathaniel Lamar is a a(n) 49year old male.  This is a pleasant poorly control hallux amputation      Family History   Problem Relation Age of Onset   • Cancer Mother       Social History    Socioeconomic History      Marital status: Single    Tobacco Use      Smoking status: Former Smoker      Smokeless tobacco: Never Used    Vaping (H) 09/20/2021    BUN 11.0 09/20/2021    CREATSERUM 0.64 (L) 09/20/2021    BUNCREA 17.0 09/20/2021    ANIONGAP 6 09/13/2021    GFRAA 120 09/13/2021    GFRNAA 104 09/13/2021    CA 9.3 09/20/2021     09/20/2021    K 4.29 09/20/2021     09/20/2021 started on oral abx for 10 days     Clearance margin negative for osteomyelitis    Pt saw endocrinologist and increased glipizide    X rays Right foot: 8/25/21  CONCLUSION:   1.  Status post additional amputation through the proximal 1st metatarsal with no

## 2021-09-29 ENCOUNTER — OFFICE VISIT (OUTPATIENT)
Dept: PODIATRY CLINIC | Facility: CLINIC | Age: 51
End: 2021-09-29
Payer: OTHER MISCELLANEOUS

## 2021-09-29 DIAGNOSIS — I73.9 PERIPHERAL ARTERIAL DISEASE WITH HISTORY OF REVASCULARIZATION (HCC): ICD-10-CM

## 2021-09-29 DIAGNOSIS — E11.65 UNCONTROLLED TYPE 2 DIABETES MELLITUS WITH HYPERGLYCEMIA (HCC): ICD-10-CM

## 2021-09-29 DIAGNOSIS — Z98.890 PERIPHERAL ARTERIAL DISEASE WITH HISTORY OF REVASCULARIZATION (HCC): ICD-10-CM

## 2021-09-29 DIAGNOSIS — M79.671 ISCHEMIC PAIN OF FOOT, RIGHT: ICD-10-CM

## 2021-09-29 DIAGNOSIS — I99.8 ISCHEMIC PAIN OF FOOT, RIGHT: ICD-10-CM

## 2021-09-29 DIAGNOSIS — Z89.411 HISTORY OF PARTIAL RAY AMPUTATION OF RIGHT GREAT TOE (HCC): Primary | ICD-10-CM

## 2021-09-29 PROCEDURE — 99213 OFFICE O/P EST LOW 20 MIN: CPT | Performed by: PODIATRIST

## 2021-09-29 NOTE — PROGRESS NOTES
[facility], [facility] Podiatry  Progress Note    Milagro Winston is a 49year old male. Patient presents with:  Wound Care: R foot. States slight pain. On and off swelling. Denies any numbness or tingling. BS this morning was 191. A1C on 7/20, 10.1.         HPI: [facility])       Past Surgical History:   Procedure Laterality Date   • OTHER SURGICAL HISTORY Right 04/2021    hallux amputation      Family History   Problem Relation Age of Onset   • Cancer Mother       Social History    Socioeconomic History      Marital st foot     Severe HDS right foot 2-5    LABS & IMAGING:     Lab Results   Component Value Date     (H) 09/20/2021    BUN 11.0 09/20/2021    CREATSERUM 0.64 (L) 09/20/2021    BUNCREA 17.0 09/20/2021    ANIONGAP 6 09/13/2021    GFRAA 120 09/13/2021    G margin negative for osteomyelitis    Pt saw endocrinologist and increased glipizide    X rays Right foot: 8/25/21  CONCLUSION:   1. Status post additional amputation through the proximal 1st metatarsal with no definite osteomyelitis identified.      Amari Alter

## 2021-10-04 RX ORDER — GLIPIZIDE 5 MG/1
TABLET ORAL
Qty: 60 TABLET | Refills: 0 | Status: SHIPPED | OUTPATIENT
Start: 2021-10-04 | End: 2022-01-02

## 2021-10-06 ENCOUNTER — OFFICE VISIT (OUTPATIENT)
Dept: PODIATRY CLINIC | Facility: CLINIC | Age: 51
End: 2021-10-06
Payer: OTHER MISCELLANEOUS

## 2021-10-06 DIAGNOSIS — Z98.890 PERIPHERAL ARTERIAL DISEASE WITH HISTORY OF REVASCULARIZATION (HCC): ICD-10-CM

## 2021-10-06 DIAGNOSIS — E11.65 UNCONTROLLED TYPE 2 DIABETES MELLITUS WITH HYPERGLYCEMIA (HCC): ICD-10-CM

## 2021-10-06 DIAGNOSIS — M79.671 ISCHEMIC PAIN OF FOOT, RIGHT: ICD-10-CM

## 2021-10-06 DIAGNOSIS — I99.8 ISCHEMIC PAIN OF FOOT, RIGHT: ICD-10-CM

## 2021-10-06 DIAGNOSIS — Z89.411 HISTORY OF PARTIAL RAY AMPUTATION OF RIGHT GREAT TOE (HCC): Primary | ICD-10-CM

## 2021-10-06 DIAGNOSIS — I73.9 PERIPHERAL ARTERIAL DISEASE WITH HISTORY OF REVASCULARIZATION (HCC): ICD-10-CM

## 2021-10-06 PROCEDURE — 99212 OFFICE O/P EST SF 10 MIN: CPT | Performed by: PODIATRIST

## 2021-10-06 NOTE — PROGRESS NOTES
[facility], [facility] Podiatry  Progress Note    Odilon David is a 49year old male. Patient presents with:  Wound Recheck: 1 wk f/u on right foot wound. A1c 7/20/21: 10.1. Morning sugar 111.  Rates pain 6/10        HPI:     Odilon David is a a(n) 49 year o hallux amputation      Family History   Problem Relation Age of Onset   • Cancer Mother       Social History    Socioeconomic History      Marital status: Single    Tobacco Use      Smoking status: Former Smoker      Smokeless tobacco: Never Used    Vap 09/20/2021    BUN 11.0 09/20/2021    CREATSERUM 0.64 (L) 09/20/2021    BUNCREA 17.0 09/20/2021    ANIONGAP 6 09/13/2021    GFRAA 120 09/13/2021    GFRNAA 104 09/13/2021    CA 9.3 09/20/2021     09/20/2021    K 4.29 09/20/2021     09/20/2021 inserts and shoes, pt has fitting in October 15      RTC 1 month, to evaluate foot and shoes. No follow-ups on file.     Danielito Burleson DPM  10/6/21

## 2021-10-08 ENCOUNTER — TELEPHONE (OUTPATIENT)
Dept: PODIATRY CLINIC | Facility: CLINIC | Age: 51
End: 2021-10-08

## 2021-10-08 NOTE — TELEPHONE ENCOUNTER
Liv Carrasco from [facility] medical manager [facility] requesting last visit clinical records 10/06/21.  Please fax to: [facility] Tierra Ricks: Fax: [phone]

## 2021-11-02 ENCOUNTER — TELEPHONE (OUTPATIENT)
Dept: PODIATRY CLINIC | Facility: CLINIC | Age: 51
End: 2021-11-02

## 2021-11-02 NOTE — TELEPHONE ENCOUNTER
Per Heather Keating, needing to verify if office will be providing inserts to pt before he finds vendor for orthotics. States received a rush order.  Please advise

## 2021-11-03 ENCOUNTER — OFFICE VISIT (OUTPATIENT)
Dept: PODIATRY CLINIC | Facility: CLINIC | Age: 51
End: 2021-11-03
Payer: OTHER MISCELLANEOUS

## 2021-11-03 DIAGNOSIS — Z89.411 HISTORY OF PARTIAL RAY AMPUTATION OF RIGHT GREAT TOE (HCC): Primary | ICD-10-CM

## 2021-11-03 DIAGNOSIS — Z98.890 PERIPHERAL ARTERIAL DISEASE WITH HISTORY OF REVASCULARIZATION (HCC): ICD-10-CM

## 2021-11-03 DIAGNOSIS — M79.2 NEUROPATHIC PAIN OF RIGHT FOOT: ICD-10-CM

## 2021-11-03 DIAGNOSIS — E11.42 TYPE 2 DIABETES MELLITUS WITH POLYNEUROPATHY (HCC): ICD-10-CM

## 2021-11-03 DIAGNOSIS — E11.65 UNCONTROLLED TYPE 2 DIABETES MELLITUS WITH HYPERGLYCEMIA (HCC): ICD-10-CM

## 2021-11-03 DIAGNOSIS — I73.9 PERIPHERAL ARTERIAL DISEASE WITH HISTORY OF REVASCULARIZATION (HCC): ICD-10-CM

## 2021-11-03 PROCEDURE — 99213 OFFICE O/P EST LOW 20 MIN: CPT | Performed by: PODIATRIST

## 2021-11-03 RX ORDER — GABAPENTIN 300 MG/1
300 CAPSULE ORAL NIGHTLY
Qty: 30 CAPSULE | Refills: 2 | Status: SHIPPED | OUTPATIENT
Start: 2021-11-03 | End: 2022-02-01

## 2021-11-03 NOTE — TELEPHONE ENCOUNTER
S/w May Espitia and he said he s/w pt today to clarify as geoff thought pt was getting fitted for DM shoes and inserts at the clinic, but pt informed him it will be done through [facility]. May Espitia states he s/w [facility] clinic and will f/u with them.  Nothing furthe

## 2021-11-03 NOTE — PROGRESS NOTES
[facility] Podiatry  Progress Note    Odilon David is a 49year old male. Patient presents with: Follow - Up: Wound care from Sx 9/20/2021, Patient in pain scale 6, , Last A1C 10.6        HPI:     Odilon David is a a(n) 49year old male. hallux amputation      Family History   Problem Relation Age of Onset   • Cancer Mother       Social History    Socioeconomic History      Marital status: Single    Tobacco Use      Smoking status: Former Smoker      Smokeless tobacco: Never Used    Vaping mellitus with polyneuropathy (HCC)    Neuropathic pain of right foot        Plan:       Evaluated wound which is still healed right foot     Arterial doppler did demonstrate mild to moderate PAD right LE with tib peroneal occlusive disease, Pt is s/p Right

## 2021-12-15 ENCOUNTER — TELEPHONE (OUTPATIENT)
Dept: PODIATRY CLINIC | Facility: CLINIC | Age: 51
End: 2021-12-15

## 2021-12-15 ENCOUNTER — OFFICE VISIT (OUTPATIENT)
Dept: PODIATRY CLINIC | Facility: CLINIC | Age: 51
End: 2021-12-15
Payer: OTHER MISCELLANEOUS

## 2021-12-15 DIAGNOSIS — Z98.890 PERIPHERAL ARTERIAL DISEASE WITH HISTORY OF REVASCULARIZATION (HCC): ICD-10-CM

## 2021-12-15 DIAGNOSIS — Z89.411 HISTORY OF PARTIAL RAY AMPUTATION OF RIGHT GREAT TOE (HCC): Primary | ICD-10-CM

## 2021-12-15 DIAGNOSIS — E11.42 TYPE 2 DIABETES MELLITUS WITH POLYNEUROPATHY (HCC): ICD-10-CM

## 2021-12-15 DIAGNOSIS — I73.9 PERIPHERAL ARTERIAL DISEASE WITH HISTORY OF REVASCULARIZATION (HCC): ICD-10-CM

## 2021-12-15 DIAGNOSIS — M79.2 NEUROPATHIC PAIN OF RIGHT FOOT: ICD-10-CM

## 2021-12-15 DIAGNOSIS — E11.65 UNCONTROLLED TYPE 2 DIABETES MELLITUS WITH HYPERGLYCEMIA (HCC): ICD-10-CM

## 2021-12-15 PROCEDURE — 99212 OFFICE O/P EST SF 10 MIN: CPT | Performed by: PODIATRIST

## 2021-12-15 NOTE — PROGRESS NOTES
[facility], [facility] Podiatry  Progress Note    Vi Feng is a 49year old male. Patient presents with:   Follow - Up: Amputation right foot, new growth on side of foot, denies pain,  this AM        HPI:     Vi Feng is a a(n) 49year old male Amputation of great toe, right, traumatic ([facility] 04/23/2021   • Diabetes ([facility]    • Gangrene of toe of right foot ([facility]    • Heart attack [facility])       Past Surgical History:   Procedure Laterality Date   • OTHER SURGICAL HISTORY Right 04/2021    hallux amput all orders for this visit:    History of partial ray amputation of right great toe ([facility]    Peripheral arterial disease with history of revascularization ([facility]    Uncontrolled type 2 diabetes mellitus with hyperglycemia ([facility]    Type 2 diabetes mellitus wi

## 2022-01-19 ENCOUNTER — OFFICE VISIT (OUTPATIENT)
Dept: PODIATRY CLINIC | Facility: CLINIC | Age: 52
End: 2022-01-19
Payer: OTHER MISCELLANEOUS

## 2022-01-19 DIAGNOSIS — M79.2 NEUROPATHIC PAIN OF RIGHT FOOT: ICD-10-CM

## 2022-01-19 DIAGNOSIS — E11.65 UNCONTROLLED TYPE 2 DIABETES MELLITUS WITH HYPERGLYCEMIA (HCC): ICD-10-CM

## 2022-01-19 DIAGNOSIS — E11.42 TYPE 2 DIABETES MELLITUS WITH POLYNEUROPATHY (HCC): ICD-10-CM

## 2022-01-19 DIAGNOSIS — Z98.890 PERIPHERAL ARTERIAL DISEASE WITH HISTORY OF REVASCULARIZATION (HCC): ICD-10-CM

## 2022-01-19 DIAGNOSIS — Z89.411 HISTORY OF PARTIAL RAY AMPUTATION OF RIGHT GREAT TOE (HCC): Primary | ICD-10-CM

## 2022-01-19 DIAGNOSIS — I73.9 PERIPHERAL ARTERIAL DISEASE WITH HISTORY OF REVASCULARIZATION (HCC): ICD-10-CM

## 2022-01-19 PROCEDURE — 99213 OFFICE O/P EST LOW 20 MIN: CPT | Performed by: PODIATRIST

## 2022-01-19 NOTE — PROGRESS NOTES
[facility], [facility] Podiatry  Progress Note    Geraldine Hall is a 49year old male. Patient presents with:   Foot Pain: s/p R big toe amputation from 7/22/21 f/u - states he still has pain and swelling in the foot rated as 4-10/10 at all the time - seems to h tablet (40 mg total) by mouth nightly.  30 tablet 0      Past Medical History:   Diagnosis Date   • Amputation of great toe, right, traumatic ([facility] 04/23/2021   • Diabetes ([facility]    • Gangrene of toe of right foot ([facility]    • Heart attack [facility])       Past Maida 07/20/2021    A1C 10.1 (H) 07/20/2021        No results found.      ASSESSMENT AND PLAN:   Diagnoses and all orders for this visit:    History of partial ray amputation of right great toe ([address]    Peripheral arterial disease with history of revascularizatio

## 2022-02-04 ENCOUNTER — TELEPHONE (OUTPATIENT)
Dept: PODIATRY CLINIC | Facility: CLINIC | Age: 52
End: 2022-02-04

## 2022-02-04 NOTE — TELEPHONE ENCOUNTER
Ira Nurse and informed him he should call medical records for any OV notes. He states he has most recent OV note from 1/19/22 and it doesn't address whether pt is off work d/t injury or d/t diabetes. Please advise.

## 2022-02-04 NOTE — TELEPHONE ENCOUNTER
Per Chang Zafar RN, calling to clarify if pt is off work due to diabetes or issue with foot.  Please advise   Fax # [phone]

## 2022-02-11 ENCOUNTER — TELEPHONE (OUTPATIENT)
Dept: PODIATRY CLINIC | Facility: CLINIC | Age: 52
End: 2022-02-11

## 2022-02-11 NOTE — TELEPHONE ENCOUNTER
Caroline ballard from [facility] orthotics states pt is in office now needs clarification please

## 2022-02-11 NOTE — TELEPHONE ENCOUNTER
Pt walked into [place] office and requested from MD directly. Pt would not specify the nature of the call but was advised that we did receive the message from Evie Hilario. He said he was aware that Adis had called but would still like to speak to Dr Carlito Dotson today 2/11/22.  Please advise

## 2022-02-11 NOTE — TELEPHONE ENCOUNTER
Called Caroline x 2, left message to call back to clarify what is needed. Will follow up with patient after that.

## 2022-02-14 NOTE — TELEPHONE ENCOUNTER
I spoke with patient. I prescribed new custom inserts with diabetic shoes. He will come by the clinic and  the order.

## 2022-03-11 ENCOUNTER — HOSPITAL ENCOUNTER (OUTPATIENT)
Dept: GENERAL RADIOLOGY | Age: 52
Discharge: HOME | End: 2022-03-11
Attending: PODIATRIST
Payer: OTHER MISCELLANEOUS

## 2022-03-11 ENCOUNTER — LAB ENCOUNTER (OUTPATIENT)
Dept: LAB | Age: 52
End: 2022-03-11
Attending: PODIATRIST
Payer: OTHER MISCELLANEOUS

## 2022-03-11 ENCOUNTER — OFFICE VISIT (OUTPATIENT)
Dept: PODIATRY CLINIC | Facility: CLINIC | Age: 52
End: 2022-03-11

## 2022-03-11 DIAGNOSIS — M10.072 ACUTE IDIOPATHIC GOUT OF LEFT FOOT: Primary | ICD-10-CM

## 2022-03-11 DIAGNOSIS — M10.072 ACUTE IDIOPATHIC GOUT OF LEFT FOOT: ICD-10-CM

## 2022-03-11 DIAGNOSIS — I73.9 PERIPHERAL ARTERIAL DISEASE WITH HISTORY OF REVASCULARIZATION (HCC): ICD-10-CM

## 2022-03-11 DIAGNOSIS — M20.22 ACQUIRED HALLUX RIGIDUS OF LEFT FOOT: ICD-10-CM

## 2022-03-11 DIAGNOSIS — Z89.411 HISTORY OF PARTIAL RAY AMPUTATION OF RIGHT GREAT TOE (HCC): ICD-10-CM

## 2022-03-11 DIAGNOSIS — Z98.890 PERIPHERAL ARTERIAL DISEASE WITH HISTORY OF REVASCULARIZATION (HCC): ICD-10-CM

## 2022-03-11 DIAGNOSIS — E11.42 TYPE 2 DIABETES MELLITUS WITH POLYNEUROPATHY (HCC): ICD-10-CM

## 2022-03-11 LAB — URATE SERPL-MCNC: 3.1 MG/DL

## 2022-03-11 PROCEDURE — 73630 X-RAY EXAM OF FOOT: CPT | Performed by: PODIATRIST

## 2022-03-11 PROCEDURE — 36415 COLL VENOUS BLD VENIPUNCTURE: CPT

## 2022-03-11 PROCEDURE — 84550 ASSAY OF BLOOD/URIC ACID: CPT

## 2022-03-11 PROCEDURE — 99213 OFFICE O/P EST LOW 20 MIN: CPT | Performed by: PODIATRIST

## 2022-03-11 RX ORDER — INDOMETHACIN 25 MG/1
25 CAPSULE ORAL
Qty: 6 CAPSULE | Refills: 0 | Status: SHIPPED | OUTPATIENT
Start: 2022-03-11 | End: 2022-03-13

## 2022-03-22 ENCOUNTER — OFFICE VISIT (OUTPATIENT)
Dept: PODIATRY CLINIC | Facility: CLINIC | Age: 52
End: 2022-03-22
Payer: OTHER MISCELLANEOUS

## 2022-03-22 DIAGNOSIS — Z98.890 PERIPHERAL ARTERIAL DISEASE WITH HISTORY OF REVASCULARIZATION (HCC): ICD-10-CM

## 2022-03-22 DIAGNOSIS — I73.9 PERIPHERAL ARTERIAL DISEASE WITH HISTORY OF REVASCULARIZATION (HCC): ICD-10-CM

## 2022-03-22 DIAGNOSIS — E11.42 TYPE 2 DIABETES MELLITUS WITH POLYNEUROPATHY (HCC): ICD-10-CM

## 2022-03-22 DIAGNOSIS — M20.22 ACQUIRED HALLUX RIGIDUS OF LEFT FOOT: Primary | ICD-10-CM

## 2022-03-22 DIAGNOSIS — R26.81 GAIT INSTABILITY: ICD-10-CM

## 2022-03-22 DIAGNOSIS — Z89.411 HISTORY OF PARTIAL RAY AMPUTATION OF RIGHT GREAT TOE (HCC): ICD-10-CM

## 2022-03-22 PROCEDURE — 99213 OFFICE O/P EST LOW 20 MIN: CPT | Performed by: PODIATRIST

## 2022-05-17 ENCOUNTER — OFFICE VISIT (OUTPATIENT)
Dept: PODIATRY CLINIC | Facility: CLINIC | Age: 52
End: 2022-05-17
Payer: OTHER MISCELLANEOUS

## 2022-05-17 DIAGNOSIS — Z98.890 PERIPHERAL ARTERIAL DISEASE WITH HISTORY OF REVASCULARIZATION (HCC): ICD-10-CM

## 2022-05-17 DIAGNOSIS — I73.9 PERIPHERAL ARTERIAL DISEASE WITH HISTORY OF REVASCULARIZATION (HCC): ICD-10-CM

## 2022-05-17 DIAGNOSIS — E11.42 TYPE 2 DIABETES MELLITUS WITH POLYNEUROPATHY (HCC): ICD-10-CM

## 2022-05-17 DIAGNOSIS — M20.22 ACQUIRED HALLUX RIGIDUS OF LEFT FOOT: Primary | ICD-10-CM

## 2022-05-17 DIAGNOSIS — R26.81 GAIT INSTABILITY: ICD-10-CM

## 2022-05-17 DIAGNOSIS — Z89.411 HISTORY OF PARTIAL RAY AMPUTATION OF RIGHT GREAT TOE (HCC): ICD-10-CM

## 2022-05-17 PROCEDURE — 99212 OFFICE O/P EST SF 10 MIN: CPT | Performed by: PODIATRIST

## 2022-07-19 ENCOUNTER — OFFICE VISIT (OUTPATIENT)
Dept: PODIATRY CLINIC | Facility: CLINIC | Age: 52
End: 2022-07-19
Payer: OTHER MISCELLANEOUS

## 2022-07-19 DIAGNOSIS — E11.42 TYPE 2 DIABETES MELLITUS WITH POLYNEUROPATHY (HCC): ICD-10-CM

## 2022-07-19 DIAGNOSIS — I73.9 PERIPHERAL ARTERIAL DISEASE WITH HISTORY OF REVASCULARIZATION (HCC): ICD-10-CM

## 2022-07-19 DIAGNOSIS — Z89.411 HISTORY OF PARTIAL RAY AMPUTATION OF RIGHT GREAT TOE (HCC): ICD-10-CM

## 2022-07-19 DIAGNOSIS — R26.81 GAIT INSTABILITY: ICD-10-CM

## 2022-07-19 DIAGNOSIS — M20.22 ACQUIRED HALLUX RIGIDUS OF LEFT FOOT: Primary | ICD-10-CM

## 2022-07-19 DIAGNOSIS — Z98.890 PERIPHERAL ARTERIAL DISEASE WITH HISTORY OF REVASCULARIZATION (HCC): ICD-10-CM

## 2022-07-19 PROCEDURE — 99212 OFFICE O/P EST SF 10 MIN: CPT | Performed by: PODIATRIST

## 2022-09-20 ENCOUNTER — OFFICE VISIT (OUTPATIENT)
Dept: PODIATRY CLINIC | Facility: CLINIC | Age: 52
End: 2022-09-20

## 2022-09-20 DIAGNOSIS — E11.42 TYPE 2 DIABETES MELLITUS WITH POLYNEUROPATHY (HCC): ICD-10-CM

## 2022-09-20 DIAGNOSIS — Z89.411 HISTORY OF PARTIAL RAY AMPUTATION OF RIGHT GREAT TOE (HCC): Primary | ICD-10-CM

## 2022-09-20 DIAGNOSIS — M79.2 NEUROPATHIC PAIN OF RIGHT FOOT: ICD-10-CM

## 2022-09-20 DIAGNOSIS — I73.9 PERIPHERAL ARTERIAL DISEASE WITH HISTORY OF REVASCULARIZATION (HCC): ICD-10-CM

## 2022-09-20 DIAGNOSIS — Z98.890 PERIPHERAL ARTERIAL DISEASE WITH HISTORY OF REVASCULARIZATION (HCC): ICD-10-CM

## 2022-09-20 PROCEDURE — 99213 OFFICE O/P EST LOW 20 MIN: CPT | Performed by: PODIATRIST

## 2022-09-20 RX ORDER — GABAPENTIN 300 MG/1
300 CAPSULE ORAL NIGHTLY
Qty: 30 CAPSULE | Refills: 2 | Status: SHIPPED | OUTPATIENT
Start: 2022-09-20 | End: 2022-12-19

## 2022-11-08 ENCOUNTER — OFFICE VISIT (OUTPATIENT)
Dept: PODIATRY CLINIC | Facility: CLINIC | Age: 52
End: 2022-11-08
Payer: OTHER MISCELLANEOUS

## 2022-11-08 DIAGNOSIS — E11.42 DIABETIC POLYNEUROPATHY ASSOCIATED WITH TYPE 2 DIABETES MELLITUS (HCC): ICD-10-CM

## 2022-11-08 DIAGNOSIS — Z98.890 PERIPHERAL ARTERIAL DISEASE WITH HISTORY OF REVASCULARIZATION (HCC): ICD-10-CM

## 2022-11-08 DIAGNOSIS — I73.9 PERIPHERAL ARTERIAL DISEASE WITH HISTORY OF REVASCULARIZATION (HCC): ICD-10-CM

## 2022-11-08 DIAGNOSIS — Z89.411 HISTORY OF PARTIAL RAY AMPUTATION OF FIRST TOE OF RIGHT FOOT (HCC): Primary | ICD-10-CM

## 2022-11-08 DIAGNOSIS — M79.2 NEUROPATHIC PAIN OF RIGHT FOOT: ICD-10-CM

## 2022-11-08 PROCEDURE — 99212 OFFICE O/P EST SF 10 MIN: CPT | Performed by: PODIATRIST

## 2023-01-01 ENCOUNTER — HOSPITAL ENCOUNTER (EMERGENCY)
Facility: HOSPITAL | Age: 53
Discharge: HOME | End: 2023-01-01
Attending: EMERGENCY MEDICINE

## 2023-01-01 ENCOUNTER — HOSPITAL ENCOUNTER (EMERGENCY)
Facility: HOSPITAL | Age: 53
End: 2023-01-01
Attending: EMERGENCY MEDICINE

## 2023-01-01 ENCOUNTER — APPOINTMENT (OUTPATIENT)
Dept: GENERAL RADIOLOGY | Facility: HOSPITAL | Age: 53
End: 2023-01-01
Attending: EMERGENCY MEDICINE

## 2023-01-01 ENCOUNTER — APPOINTMENT (OUTPATIENT)
Dept: CT IMAGING | Facility: HOSPITAL | Age: 53
End: 2023-01-01
Attending: EMERGENCY MEDICINE

## 2023-01-01 VITALS
WEIGHT: 171.31 LBS | TEMPERATURE: 98 F | OXYGEN SATURATION: 99 % | HEART RATE: 87 BPM | BODY MASS INDEX: 23 KG/M2 | DIASTOLIC BLOOD PRESSURE: 76 MMHG | RESPIRATION RATE: 22 BRPM | SYSTOLIC BLOOD PRESSURE: 113 MMHG

## 2023-01-01 VITALS
SYSTOLIC BLOOD PRESSURE: 148 MMHG | HEIGHT: 72 IN | BODY MASS INDEX: 24.24 KG/M2 | WEIGHT: 179 LBS | DIASTOLIC BLOOD PRESSURE: 62 MMHG | OXYGEN SATURATION: 95 % | RESPIRATION RATE: 18 BRPM | HEART RATE: 102 BPM | TEMPERATURE: 98 F

## 2023-01-01 DIAGNOSIS — I96 GANGRENE OF TOE (HCC): Primary | ICD-10-CM

## 2023-01-01 DIAGNOSIS — M72.6 NECROTIZING FASCIITIS (HCC): ICD-10-CM

## 2023-01-01 DIAGNOSIS — I73.9 PAD (PERIPHERAL ARTERY DISEASE) (HCC): ICD-10-CM

## 2023-01-01 DIAGNOSIS — S90.32XA CONTUSION OF LEFT FOOT, INITIAL ENCOUNTER: Primary | ICD-10-CM

## 2023-01-01 LAB
ANION GAP SERPL CALC-SCNC: 11 MMOL/L (ref 0–18)
ANION GAP SERPL CALC-SCNC: 8 MMOL/L (ref 0–18)
APTT PPP: 28.3 SECONDS (ref 23.3–35.6)
BASOPHILS # BLD AUTO: 0.03 X10(3) UL (ref 0–0.2)
BASOPHILS NFR BLD AUTO: 0.2 %
BUN BLD-MCNC: 12 MG/DL (ref 7–18)
BUN BLD-MCNC: 9 MG/DL (ref 7–18)
BUN/CREAT SERPL: 10.3 (ref 10–20)
BUN/CREAT SERPL: 14 (ref 10–20)
CALCIUM BLD-MCNC: 8.5 MG/DL (ref 8.5–10.1)
CALCIUM BLD-MCNC: 8.8 MG/DL (ref 8.5–10.1)
CHLORIDE SERPL-SCNC: 100 MMOL/L (ref 98–112)
CHLORIDE SERPL-SCNC: 104 MMOL/L (ref 98–112)
CO2 SERPL-SCNC: 24 MMOL/L (ref 21–32)
CO2 SERPL-SCNC: 24 MMOL/L (ref 21–32)
CREAT BLD-MCNC: 0.86 MG/DL
CREAT BLD-MCNC: 0.87 MG/DL
DEPRECATED RDW RBC AUTO: 39.3 FL (ref 35.1–46.3)
DEPRECATED RDW RBC AUTO: 40 FL (ref 35.1–46.3)
EOSINOPHIL # BLD AUTO: 0.04 X10(3) UL (ref 0–0.7)
EOSINOPHIL NFR BLD AUTO: 0.2 %
ERYTHROCYTE [DISTWIDTH] IN BLOOD BY AUTOMATED COUNT: 12.6 % (ref 11–15)
ERYTHROCYTE [DISTWIDTH] IN BLOOD BY AUTOMATED COUNT: 13.1 % (ref 11–15)
GFR SERPLBLD BASED ON 1.73 SQ M-ARVRAT: 104 ML/MIN/1.73M2 (ref 60–?)
GFR SERPLBLD BASED ON 1.73 SQ M-ARVRAT: 104 ML/MIN/1.73M2 (ref 60–?)
GLUCOSE BLD-MCNC: 350 MG/DL (ref 70–99)
GLUCOSE BLD-MCNC: 389 MG/DL (ref 70–99)
GLUCOSE BLDC GLUCOMTR-MCNC: 287 MG/DL (ref 70–99)
HCT VFR BLD AUTO: 37.8 %
HCT VFR BLD AUTO: 41.9 %
HGB BLD-MCNC: 13.2 G/DL
HGB BLD-MCNC: 14.6 G/DL
IMM GRANULOCYTES # BLD AUTO: 0.07 X10(3) UL (ref 0–1)
IMM GRANULOCYTES NFR BLD: 0.4 %
LACTATE SERPL-SCNC: 0.8 MMOL/L (ref 0.4–2)
LACTATE SERPL-SCNC: 1.1 MMOL/L (ref 0.4–2)
LYMPHOCYTES # BLD AUTO: 2.15 X10(3) UL (ref 1–4)
LYMPHOCYTES NFR BLD AUTO: 12.4 %
MCH RBC QN AUTO: 29.7 PG (ref 26–34)
MCH RBC QN AUTO: 29.8 PG (ref 26–34)
MCHC RBC AUTO-ENTMCNC: 34.8 G/DL (ref 31–37)
MCHC RBC AUTO-ENTMCNC: 34.9 G/DL (ref 31–37)
MCV RBC AUTO: 85.3 FL
MCV RBC AUTO: 85.3 FL
MONOCYTES # BLD AUTO: 1.69 X10(3) UL (ref 0.1–1)
MONOCYTES NFR BLD AUTO: 9.7 %
NEUTROPHILS # BLD AUTO: 13.4 X10 (3) UL (ref 1.5–7.7)
NEUTROPHILS # BLD AUTO: 13.4 X10(3) UL (ref 1.5–7.7)
NEUTROPHILS NFR BLD AUTO: 77.1 %
OSMOLALITY SERPL CALC.SUM OF ELEC: 295 MOSM/KG (ref 275–295)
OSMOLALITY SERPL CALC.SUM OF ELEC: 296 MOSM/KG (ref 275–295)
PLATELET # BLD AUTO: 178 10(3)UL (ref 150–450)
PLATELET # BLD AUTO: 185 10(3)UL (ref 150–450)
POTASSIUM SERPL-SCNC: 3.3 MMOL/L (ref 3.5–5.1)
POTASSIUM SERPL-SCNC: 4.3 MMOL/L (ref 3.5–5.1)
RBC # BLD AUTO: 4.43 X10(6)UL
RBC # BLD AUTO: 4.91 X10(6)UL
SODIUM SERPL-SCNC: 135 MMOL/L (ref 136–145)
SODIUM SERPL-SCNC: 136 MMOL/L (ref 136–145)
WBC # BLD AUTO: 14.6 X10(3) UL (ref 4–11)
WBC # BLD AUTO: 17.4 X10(3) UL (ref 4–11)

## 2023-01-01 PROCEDURE — 75635 CT ANGIO ABDOMINAL ARTERIES: CPT | Performed by: EMERGENCY MEDICINE

## 2023-01-01 PROCEDURE — 73700 CT LOWER EXTREMITY W/O DYE: CPT | Performed by: EMERGENCY MEDICINE

## 2023-01-01 PROCEDURE — 99285 EMERGENCY DEPT VISIT HI MDM: CPT

## 2023-01-01 PROCEDURE — 85027 COMPLETE CBC AUTOMATED: CPT | Performed by: EMERGENCY MEDICINE

## 2023-01-01 PROCEDURE — 96374 THER/PROPH/DIAG INJ IV PUSH: CPT

## 2023-01-01 PROCEDURE — 96361 HYDRATE IV INFUSION ADD-ON: CPT

## 2023-01-01 PROCEDURE — 80048 BASIC METABOLIC PNL TOTAL CA: CPT | Performed by: EMERGENCY MEDICINE

## 2023-01-01 PROCEDURE — 99223 1ST HOSP IP/OBS HIGH 75: CPT | Performed by: INTERNAL MEDICINE

## 2023-01-01 PROCEDURE — 73630 X-RAY EXAM OF FOOT: CPT | Performed by: EMERGENCY MEDICINE

## 2023-01-01 PROCEDURE — 83605 ASSAY OF LACTIC ACID: CPT | Performed by: EMERGENCY MEDICINE

## 2023-01-01 RX ORDER — MORPHINE SULFATE 4 MG/ML
4 INJECTION, SOLUTION INTRAMUSCULAR; INTRAVENOUS ONCE
Status: COMPLETED | OUTPATIENT
Start: 2023-01-01 | End: 2023-01-01

## 2023-01-01 RX ORDER — SODIUM PHOSPHATE, DIBASIC AND SODIUM PHOSPHATE, MONOBASIC 7; 19 G/133ML; G/133ML
1 ENEMA RECTAL ONCE AS NEEDED
OUTPATIENT
Start: 2023-01-01

## 2023-01-01 RX ORDER — GUAIFENESIN 600 MG/1
600 TABLET, EXTENDED RELEASE ORAL 2 TIMES DAILY
OUTPATIENT
Start: 2023-01-01

## 2023-01-01 RX ORDER — NALOXONE HYDROCHLORIDE 0.4 MG/ML
INJECTION, SOLUTION INTRAMUSCULAR; INTRAVENOUS; SUBCUTANEOUS
Status: COMPLETED | OUTPATIENT
Start: 2023-01-01 | End: 2023-01-01

## 2023-01-01 RX ORDER — BISACODYL 10 MG
10 SUPPOSITORY, RECTAL RECTAL
OUTPATIENT
Start: 2023-01-01

## 2023-01-01 RX ORDER — MELATONIN
3 NIGHTLY PRN
OUTPATIENT
Start: 2023-01-01

## 2023-01-01 RX ORDER — PROCHLORPERAZINE EDISYLATE 5 MG/ML
5 INJECTION INTRAMUSCULAR; INTRAVENOUS EVERY 8 HOURS PRN
OUTPATIENT
Start: 2023-01-01

## 2023-01-01 RX ORDER — BENZONATATE 200 MG/1
200 CAPSULE ORAL 3 TIMES DAILY PRN
OUTPATIENT
Start: 2023-01-01

## 2023-01-01 RX ORDER — ONDANSETRON 2 MG/ML
4 INJECTION INTRAMUSCULAR; INTRAVENOUS EVERY 6 HOURS PRN
OUTPATIENT
Start: 2023-01-01

## 2023-01-01 RX ORDER — ACETAMINOPHEN 500 MG
500 TABLET ORAL EVERY 4 HOURS PRN
OUTPATIENT
Start: 2023-01-01

## 2023-01-01 RX ORDER — VANCOMYCIN HYDROCHLORIDE
15 ONCE
Status: COMPLETED | OUTPATIENT
Start: 2023-01-01 | End: 2023-01-01

## 2023-01-01 RX ORDER — POLYETHYLENE GLYCOL 3350 17 G/17G
17 POWDER, FOR SOLUTION ORAL DAILY PRN
OUTPATIENT
Start: 2023-01-01

## 2023-01-01 RX ORDER — MORPHINE SULFATE 2 MG/ML
1 INJECTION, SOLUTION INTRAMUSCULAR; INTRAVENOUS EVERY 2 HOUR PRN
OUTPATIENT
Start: 2023-01-01

## 2023-01-01 RX ORDER — HYDROCODONE BITARTRATE AND ACETAMINOPHEN 10; 325 MG/1; MG/1
1 TABLET ORAL EVERY 6 HOURS PRN
COMMUNITY

## 2023-01-01 RX ORDER — DIPHENHYDRAMINE HYDROCHLORIDE 50 MG/ML
50 INJECTION INTRAMUSCULAR; INTRAVENOUS ONCE
Status: COMPLETED | OUTPATIENT
Start: 2023-01-01 | End: 2023-01-01

## 2023-01-01 RX ORDER — MORPHINE SULFATE 2 MG/ML
2 INJECTION, SOLUTION INTRAMUSCULAR; INTRAVENOUS EVERY 2 HOUR PRN
OUTPATIENT
Start: 2023-01-01

## 2023-01-01 RX ORDER — METHYLPREDNISOLONE SODIUM SUCCINATE 125 MG/2ML
125 INJECTION, POWDER, LYOPHILIZED, FOR SOLUTION INTRAMUSCULAR; INTRAVENOUS ONCE
Status: COMPLETED | OUTPATIENT
Start: 2023-01-01 | End: 2023-01-01

## 2023-01-01 RX ORDER — HEPARIN SODIUM AND DEXTROSE 10000; 5 [USP'U]/100ML; G/100ML
18 INJECTION INTRAVENOUS ONCE
Status: COMPLETED | OUTPATIENT
Start: 2023-01-01 | End: 2023-01-01

## 2023-01-01 RX ORDER — SENNOSIDES 8.6 MG
17.2 TABLET ORAL NIGHTLY PRN
OUTPATIENT
Start: 2023-01-01

## 2023-01-01 RX ORDER — MORPHINE SULFATE 4 MG/ML
4 INJECTION, SOLUTION INTRAMUSCULAR; INTRAVENOUS EVERY 2 HOUR PRN
OUTPATIENT
Start: 2023-01-01

## 2023-01-01 RX ORDER — HEPARIN SODIUM AND DEXTROSE 10000; 5 [USP'U]/100ML; G/100ML
INJECTION INTRAVENOUS CONTINUOUS
OUTPATIENT
Start: 2023-01-01

## 2023-01-01 RX ORDER — HEPARIN SODIUM 1000 [USP'U]/ML
80 INJECTION, SOLUTION INTRAVENOUS; SUBCUTANEOUS ONCE
Status: COMPLETED | OUTPATIENT
Start: 2023-01-01 | End: 2023-01-01

## 2023-01-01 RX ORDER — SODIUM CHLORIDE 9 MG/ML
INJECTION, SOLUTION INTRAVENOUS
Status: COMPLETED | OUTPATIENT
Start: 2023-01-01 | End: 2023-01-01

## 2023-01-24 ENCOUNTER — OFFICE VISIT (OUTPATIENT)
Dept: PODIATRY CLINIC | Facility: CLINIC | Age: 53
End: 2023-01-24

## 2023-01-24 DIAGNOSIS — I73.9 PERIPHERAL ARTERIAL DISEASE WITH HISTORY OF REVASCULARIZATION (HCC): ICD-10-CM

## 2023-01-24 DIAGNOSIS — E11.42 DIABETIC POLYNEUROPATHY ASSOCIATED WITH TYPE 2 DIABETES MELLITUS (HCC): ICD-10-CM

## 2023-01-24 DIAGNOSIS — Z89.411 HISTORY OF PARTIAL RAY AMPUTATION OF FIRST TOE OF RIGHT FOOT (HCC): Primary | ICD-10-CM

## 2023-01-24 DIAGNOSIS — M79.2 NEUROPATHIC PAIN OF RIGHT FOOT: ICD-10-CM

## 2023-01-24 DIAGNOSIS — Z98.890 PERIPHERAL ARTERIAL DISEASE WITH HISTORY OF REVASCULARIZATION (HCC): ICD-10-CM

## 2023-01-24 PROCEDURE — 99212 OFFICE O/P EST SF 10 MIN: CPT | Performed by: PODIATRIST

## 2023-01-24 RX ORDER — GABAPENTIN 300 MG/1
300 CAPSULE ORAL 2 TIMES DAILY
COMMUNITY
Start: 2022-09-20

## 2023-04-27 NOTE — ED INITIAL ASSESSMENT (HPI)
Patient here with his sister for left foot pain since this morning. Patient states he had phantom pain that causes him to fall, when he fell his toes bent backwards. Hx DB, gangrene. 4th digit of the left foot is purple and swollen, top of the foot is red, CMS intact.

## 2023-04-28 NOTE — ED QUICK NOTES
Pt wanted to leave AMA because he didn't want to wait for MD to talk to vascular surgeon. Vascular surgeon called back while getting AMA form ready.  Pt is getting discharge by MD.

## 2023-04-28 NOTE — DISCHARGE INSTRUCTIONS
Return to emergency room for any fevers of 100.4, worsening redness, fluid wave shift, laurent worsening symptoms. Wound recheck in 2 days with primary care provider/urgent care. Please follow with both your podiatrist and vascular surgeon immediately    The Emergency Department is not intended to be a substitute for an effort to provide complete medical care. The imaging, if any, have often been interpreted on a preliminary basis pending final reading by the radiologist. If your blood pressure was greater than 140/90, please have this blood pressure rechecked by your primary care provider in the next several days.

## 2023-05-01 PROBLEM — I96 GANGRENE OF TOE (HCC): Status: ACTIVE | Noted: 2023-01-01

## 2023-05-01 NOTE — ED QUICK NOTES
Unable to feel pedial or post tibial pulses to the Lt foot/ankle    Attempted to find with doppler. Possible fait pedial pulses  [place] per the doppler. Dr Mila Yanez notified.

## 2023-05-01 NOTE — ED QUICK NOTES
Entered patient room and found patient unresponsive laying on right side. No pulse/respirations noted. MD notified. Code initiated.

## 2023-05-01 NOTE — ED INITIAL ASSESSMENT (HPI)
S: pt here for pain that isn't improving on his left foot, seen here Thursday for same, pt states he hasn't had any relief since then. B: dm, amputations on right foot    A: significant swelling and bruising to left foot. Unable to palpate pulse but per previous documentation pt doesn't have palpable pedal pulses. R: na at this time as pt is assigned room.

## 2023-05-01 NOTE — PROGRESS NOTES
ED Vancomycin Initial Dose Adjustment     Vancomycin IVPB was ordered for treatment of Cellulitis. Pharmacy has adjusted the dose to Vancomycin 1250 mg IVPB x1 per hospital protocol.     Kiki Marcelino PharmD  05/01/23, 4:33 AM

## 2023-05-01 NOTE — SPIRITUAL CARE NOTE
Spiritual Care Visit Note    Writer report responding to call in ED about death of patient. Doctor speaking with sister of patient Nabila. Writer report expressing condescendence to sister and offering hospitality. Writer help process paperwork with sister, Dionicio Guzman. Sister report family and family  en route to hospital. Dionicio Guzman, sister report family requesting an autopsy. Writer mentioned this to doctor and RN. No other needs at this time. Visited With: Family    Spiritual Care Taxonomy:    Intended Effects: Demonstrate caring and concern    Methods: Collaborate with care team member;Offer support    Interventions: Active listening; Ask guided questions; Acknowledge response to difficult experience;Provide hospitality;Silent prayer    Geremias Memos, [address] [facility]   [phone]     On call  services [phone]

## 2023-05-01 NOTE — CM/SW NOTE
Spoke to Carlos Cooper pt son I advised that he come to the hospital. He states that he is currently working, and that he would contact the pt girlfriend. Nabila also called back and spoke to Dr. Kaiden Zacarias.

## 2023-05-01 NOTE — CM/SW NOTE
Received a call from the charge nurse to call family. I called the point of contact from the [facility] . No one answered. I provided our pod 2 telephone number in a voicemail.

## 2023-05-01 NOTE — ED PROVIDER NOTES
Received in signout at approximately 615 this morning by Dr. Bean Sandhu pending PCU admission with described story of recent visit for left foot pain and injury with CTA showing scattered obstructive disease back tonight with worsening pain and signs of possible subacute ischemia with infection. Vascular and podiatry were consulted. ED w/u completed awaiting PCU bed. Seen by the hospitalist.  By review of the note it appears plan was for likely angiogram and possible intervention and further assessment today vital services. The hospitalist was admitting the patient to the PCU. He was given IV antibiotics and IV heparin. His vitals were reported as stable with overall unremarkable lab workup. At around 710 I was called emergently to the room as the morning nurse went to check on the patient he was turned on his side reportedly and was not responding. At that time he was immediately assessed by ED staff and he was not breathing and without a pulse. CPR measures were started immediately. The patient was bagged. He was given IV epinephrine. Heparin was stopped at that time in addition to the antibiotics. I assessed the patient as well. I attempted initial direct laryngoscopy but because of some nodular appearing swelling on his vocal cords and subglottic regions I was not able to easily pass a tube. I moved quickly to glide scope intubation which was successful on the first attempt with a 7-0 tube. We had good breath sounds bilaterally. No tracheal deviation was noted as well. CPR had been in progress this whole time. We continued giving epinephrine. I assessed the patient at the bedside with a transthoracic ultrasound and saw no cardiac activity. I did not see any evidence of obvious pericardial effusion as well. He remained in asystole as well. Pupils were dilated and nonreactive. In addition because the patient had been given morphine earlier he was given Narcan.   His pupils however were not miotic. In addition because of the airway findings I give the patient IV Benadryl and Solu-Medrol. He was already obviously getting IV epinephrine to treat for possible allergic reaction. He was given doses of sodium bicarbonate as well. At this time I felt it reasonable to give the patient IV lytic therapy. The pharmacist in the room provided dosing of the tPA. While he did not have significant distal aortic disease on his CTA a few days ago I did not know if he had had a venous clot and thrown a massive PE or had an MI. No EKG was immediately available. I did speak to on-call vascular and updated them with no further recommendations. I notified the hospitalist service and podiatry as well. I briefly conferred with an ICU specialist who recommended 20 minutes to 30 minutes of CPR after alteplase initiation but if no response any chance of recovery at that time would most definitely not be likely. At this time the patient is on Terrell device with alteplase infusing. Unfortunately I feel efforts will not save his life at this time. We will reassess at the 30-minute gokul. I continue to give him doses of epinephrine as well. At this time all efforts have been futile. I will involve the  and  to get in touch with next of kin. They will need to be notified obviously. At approximately 823 I reassessed the patient again. Alteplase have been infusing for nearly 40 minutes at this time. While the patient remained warm at each pause of the Terrell device he continued to have no pulse and was in asystole. I then chose to perform another transthoracic ultrasound of the heart. The patient continues to have absolutely no cardiac activity. At this time  the decision was made to stop any heroic resuscitation measures and the patient was pronounced dead at 0826. The  has attempted to contact a number in the chart for family but only left a message.   The  will be involved as well. Ultimately I was able to speak with the sister who did come quickly to the ED. I talked w/ her in private about the passing of her brother. Other family members did come to the ED as well  The  helped with coordination with the nurse and family.

## 2023-05-01 NOTE — ED QUICK NOTES
INFECTIOUS DISEASE PROGRESS NOTE    Syed Neff  57 year old male  MRN : [id number]    Date: 12/15/2022     Attending physician : Mamadou Rocha MD    Reason for consult / chief complaint :  Scrotal cellulitis, epididymal orchitis, noticed to have scrotal abscess status post I&D    Interval history :      Improvement in scrotal pain and swelling,  tolerating antibiotics   Multiple operative cultures have coag-negative staph, id and sensitivity pending         Subjective :  Improvement in right-sided scrotal pain swelling and tenderness    Vitals:   Vitals:    12/15/22 0811   BP: (!) 145/80   Pulse: 68   Resp:    Temp:        Physical Examination:  General : Awake, Alert, Oriented x 3. No acute distress. Well developed, well nourished.   HEENT : Head is normocephalic, atraumatic. PERLLA. No scleral icterus. Oral mucosa moist without lesions. Good dentition.   Neck : Supple, No LAD. No thyromegaly.   Lungs : NAD, Clear to auscultation bilaterally. No wheezes, crackles, or rhonchi. No accessory muscle use.   Heart : Regular rate and rhythm. No murmurs, gallops, or rubs.  Abdomen : Soft, positive bowel sounds, Non tender, Non distended. No masses or hepatosplenomegaly appreciated.   Scrotal edema, erythema has resolved, drain with bloody output  Musculoskeletal : No clubbing, cyanosis, or edema in bilateral lower extremities.  Skin : Warm and dry, No lesions, rashes, or ulcers.  Neurological : CN II - XII grossly intact. Grossly non focal.      Current medications:   Current Facility-Administered Medications   Medication Dose Route Frequency Provider Last Rate Last Admin   • linezolid (ZYVOX) tablet 600 mg  600 mg Oral 2 times per day Ather H MD Juan J   600 mg at 12/15/22 0812   • HYDROmorphone (DILAUDID) injection 0.8 mg  0.8 mg Intravenous Q4H PRN Benjamin Jimenez MD   0.8 mg at 12/15/22 0811   • lactulose (CHRONULAC) 10 GM/15ML solution 10 g  10 g Oral Daily PRN Nichole Hendricks PA-C       • folic acid (FOLATE) tablet 1 mg   Orders for admission, patient is aware of plan and ready to go upstairs. Any questions, please call ED RN delia at extension [phone].      Patient Covid vaccination status: Fully vaccinated     COVID Test Ordered in ED: None    COVID Suspicion at Admission: N/A    Running Infusions:      Heparin drip at 15cc/Hr   1500units     Mental Status/LOC at time of transport: alert    Other pertinent information: 1 mg Oral Daily Nichole Hendricks PA-C   1 mg at 12/15/22 0812   • LORazepam (ATIVAN) tablet 2 mg  2 mg Oral Q1H PRN Nichole Hendricks PA-C        Or   • LORazepam (ATIVAN) tablet 3 mg  3 mg Oral Q1H PRN Nichole Hendricks PA-C        Or   • LORazepam (ATIVAN) tablet 4 mg  4 mg Oral Q1H PRN Nichole Hendricks PA-C        Or   • LORazepam (ATIVAN) injection 2 mg  2 mg Intravenous Q1H PRN Nichole Hendricks PA-C        Or   • LORazepam (ATIVAN) injection 3 mg  3 mg Intravenous Q1H PRN Nichole Hendricks PA-C        Or   • LORazepam (ATIVAN) injection 4 mg  4 mg Intravenous Q1H PRN Nichole Hendricks PA-C       • cefepime (MAXIPIME) 1,000 mg in sodium chloride 0.9 % 100 mL IVPB  1,000 mg Intravenous 3 times per day Ather H MD Juan J 200 mL/hr at 12/15/22 0140 1,000 mg at 12/15/22 0140   • HYDROcodone-acetaminophen (NORCO) 5-325 MG per tablet 1 tablet  1 tablet Oral Q4H PRN Syed Sierra MD   1 tablet at 12/14/22 2250   • ondansetron (ZOFRAN ODT) disintegrating tablet 4 mg  4 mg Oral Q12H PRN Syed Sierra MD        Or   • ondansetron (ZOFRAN) injection 4 mg  4 mg Intravenous Q12H PRN Syed Sierra MD       • furosemide (LASIX) tablet 20 mg  20 mg Oral Daily Mamadou Rocha MD   20 mg at 12/15/22 0812   • pantoprazole (PROTONIX) EC tablet 40 mg  40 mg Oral QAM AC Mamadou Rocha MD   40 mg at 12/15/22 0645   • spironolactone (ALDACTONE) tablet 50 mg  50 mg Oral Daily Mamadou Rocha MD   50 mg at 12/15/22 0812   • thiamine (VITAMIN B1) tablet 100 mg  100 mg Oral Daily Mamadou Rocha MD   100 mg at 12/15/22 0812   • sodium chloride 0.9 % flush bag 25 mL  25 mL Intravenous PRN Mamadou Rcoha MD       • sodium chloride (PF) 0.9 % injection 2 mL  2 mL Intracatheter 2 times per day Mamadou Rocha MD   2 mL at 12/15/22 0812   • heparin (porcine) injection 5,000 Units  5,000 Units Subcutaneous 3 times per day Mamadou Rocha MD   5,000 Units at 12/15/22 0645   • melatonin tablet 6 mg  6 mg Oral Nightly Mamadou Rocha MD   6 mg at 12/14/22 2023       Laboratory data:    Recent Labs   Lab  12/14/22  0912 12/14/22  0406 12/13/22  0809 12/13/22  0357 12/12/22  1654 12/12/22  0357   WBC  --  7.3  --  10.3  --  8.5   HCT  --  32.2*  --  33.5*  --  32.2*   HGB  --  10.1*  --  10.7*  --  10.3*   PLT  --  129*  --  131*  --  148   INR 1.5  --  1.4  --  1.4  --    SODIUM  --  137  --  134*  --  134*   POTASSIUM  --  4.2  --  4.2  --  4.3   CHLORIDE  --  105  --  102  --  106   CO2  --  27  --  27  --  24   CALCIUM  --  9.4  --  8.4  --  8.5   GLUCOSE  --  119*  --  135*  --  108*   BUN  --  10  --  9  --  11   CREATININE  --  0.79  --  0.90  --  0.80   AST  --  18  --  19  --  26   GPT  --  12  --  11  --  13   ALKPT  --  67  --  70  --  71   BILIRUBIN  --  0.7  --  0.6  --  0.5   ALBUMIN  --  2.6*  --  2.8*  --  2.8*       Recent Labs   Lab 12/12/22  1128   USPG <=1.005   UPROT Negative   UWBC Negative   URBC Negative   UNITR Negative   UBILI Negative   UPH 6.0   UROB 0.2       Imaging: Reviewed  CT PELVIS    Result Date: 12/12/2022  Narrative: CT PELVIS W CONTRAST HISTORY: Hydrocele or spermatocele, evaluate pyocele- r/o gas, include testicles/scrotum. COMPARISON: CT abdomen pelvis 2 hours prior on 12/2/2022. Testicles ultrasound 12/11/2022. TECHNIQUE:  Axial CT images of the abdomen and pelvis with 75 mL Omnipaque 300 IV contrast. Multiplanar reformats. FINDINGS:  Significant fat stranding throughout the right hemiscrotum as well as along the right spermatic cord. Adjacent to the right testicle is a complex, peripherally enhancing fluid collection measuring 2.4 x 3.2 x 3.1 cm, consistent with a pyocele also visualized on recent ultrasound. Mild fat stranding in the left hemiscrotum. No soft tissue emphysema. The visualized abdominal aorta is normal in caliber. Moderate atherosclerotic calcifications of the visualized abdominal aorta and its branches. There are no abnormal retroperitoneal or pelvic lymph nodes.  No free fluid within the pelvis.  The visualized bowel is normal in caliber and without any wall  thickening. Normal appendix. The prostate is unremarkable. 1 cm superior posterior urinary bladder wall diverticulum. Heterogeneous hepatic attenuation, partially imaged and similar in appearance to recent CT, again likely representing cirrhosis. Osseous structures are intact.  There are no suspicious osteolytic or osteoblastic lesions.     Impression: IMPRESSION: 1.  Findings of right orchitis and funiculitis with 2.4 x 3.2 x 3.1 cm right pyocele. 2.  Probable mild left orchitis. I, Attending Radiologist Vincent Camargo MD, have reviewed the images and report and concur with these findings interpreted by Resident Radiologist, Stoney Rondon MD.     US Testicles and Scrotum W Duplex    Result Date: 12/11/2022  Narrative: US TESTICLES AND SCROTUM W DUPLEX HISTORY:  R hemiscrotum swelling, recent diagnosis of epididymitis, not improving COMPARISON: Testicular ultrasound 12/3/2022. TECHNIQUE: Grayscale, color Doppler, and spectral duplex images of the testicles. FINDINGS: The right testicle measures 2.8 x 2.2 x 2.5 cm and the left testicle measures 3.1 x 1.9 x 2.2 cm. There are 3 tiny hypoechoic structures of the left testicle measuring approximately 1 to 2 mm, favored represent simple cysts. Otherwise both testicles demonstrate predominantly normal homogeneous echotexture. No evidence of testicular fracture or suspicious intratesticular lesions. Increased color flow to the bilateral testes and epididymides, right greater than left. Appropriate arterial and venous waveforms within the testicles, no evidence for torsion. Again noted are complex hydroceles bilaterally. The right collection is more prominent as slightly increased in size from prior now measuring 3.7 x 3.5 x 3.0 cm, previously 2.7 x 2.8 x 2.7 cm. There is hyperemia associated with this collection. The complex left collection appears mildly decrease in size comparison to prior. Small left epididymal cyst is again noted measuring 3 mm. No visualized scrotal  thickening.     Impression: IMPRESSION: 1.  Persistent findings of bilateral epididymoorchitis with increasing size of complex fluid collection on the right, possibly representative of a pyocele. Complex collection a left also possibly representative of a pyocele appears mildly improved in comparison to prior. 2.  No evidence for torsion. I, Attending Radiologist Gabriel Lees MD, have reviewed the images and report and concur with these findings interpreted by Resident Radiologist, Tolu Barrientos DO.     US Testicles and Scrotum W Duplex    Result Date: 12/3/2022  Narrative: US TESTICLES AND SCROTUM W DUPLEX HISTORY:   testicular pain and swelling COMPARISON: None available. TECHNIQUE: Grayscale, color Doppler, and spectral duplex images of the testicles. FINDINGS: The right testicle measures 3.4 x 2.5 x 2.5 cm and the left testicle measures 3.3 x 2.3 x 1.9 cm. Both testicles demonstrate normal homogeneous echotexture. No evidence of testicular fracture or intratesticular lesions. Increased flow corresponding to the bilateral testes and bilateral epididymides on color Doppler interrogation is seen, more prominent on the right side. Complex hydroceles bilaterally, largest in the right and small on the left. No internal vascularity corresponding to area testicular fluid collections. Bilateral low resistive arterial waveforms and normal venous Doppler waveforms are noted. Anechoic structure in the left epididymis measuring 0.3 x 0.3 x 0.2 cm consistent with benign epididymal cyst. No varicocele. Heterogeneous echotexture of the scrotum with mild thickening measuring approximately 9 mm bilaterally.     Impression: IMPRESSION: Increased vascularity corresponding to the bilateral testes and epididymides. Bilateral complex hydroceles, larger on the right and small on the left which may represent pyocele. Diffuse scrotal swelling/edema. Overall findings are suggestive for bilateral orchitis-epididymitis more pronounced  on the right. I, Attending Radiologist Juan Marin MD, have reviewed the images and report and concur with these findings interpreted by Resident Radiologist, Ron Carrasco MD.     CT ABDOMEN PELVIS W CONTRAST    Result Date: 12/12/2022  Narrative: CT ABDOMEN PELVIS W CONTRAST HISTORY: Cirrhosis and epididymoorchitis COMPARISON: CT liver 6/24/2021 and scrotal ultrasound 12/11/2022. TECHNIQUE:  Axial CT images of the abdomen and pelvis with 100 mL Omnipaque-300 IV contrast. Multiplanar reformats. FINDINGS:  LOWER CHEST: Lung bases:  No focal consolidation. Mild atelectasis/scarring within the right lung base. No pleural effusion. Heart:  Unremarkable. ABDOMEN: Liver:  Nodular hepatic contour with relative hypertrophy of the left hepatic lobe consistent with morphologic changes of cirrhosis. There is nonspecific heterogeneity of the hepatic parenchyma, most notably affecting the right hepatic lobe. Ill-defined subcapsular enhancement is noted within segment 4 (3/20, 21, and 23). Biliary system:  The gallbladder is present. No focal pericholecystic inflammatory changes. Mild prominence of the extra hepatic bile duct measuring up to 7 mm in caliber is not significantly changed. Spleen:  Similar mild splenomegaly. Pancreas:  Unremarkable. Adrenal glands:  Unremarkable. Kidneys:  Unremarkable. Bowel: No evidence of bowel obstruction. No localized inflammatory changes of the bowel are evident. The appendix is within normal limits. A small focus of calcification is identified within the proximal duodenum. Retroperitoneum/mesentery:  No free air. Small volume perihepatic ascites. Diffuse nonspecific mesenteric edema. Vascular:  Paraesophageal varices. Upper abdominal and retroperitoneal collaterals are also seen. Recanalized paraumbilical veins. The hepatic veins are suboptimally assessed secondary to contrast timing. The portal veins appear patent. The splenic vein is patent. Aortoiliac vascular calcifications are  present. Osseous structures/subcutaneous tissues:  No acute osseous abnormality. PELVIS: Partially imaged right scrotal collection with some associated peripheral soft tissue thickening and enhancement and mild adjacent fat induration. This likely corresponds to complex collection present on the prior ultrasound. Most of the scrotum is not included within the field of coverage. The urinary bladder is grossly normal. Small volume pelvic ascites. No drainable fluid collections.     Impression: IMPRESSION: 1.  Cirrhotic liver morphology. Nonspecific heterogeneity of the hepatic parenchyma may reflect superimposed fibrosis/steatosis. Ill-defined subcapsular enhancement is present within segment 4. The location suggests that this may be perfusional in etiology. Attention on follow-up liver protocol screening exam is recommended. 2.  Sequela of portal hypertension including mild splenomegaly, varices, and small volume ascites as described. 3.  Partially imaged right scrotal collection with peripheral soft tissue attenuation and adjacent fat induration, likely correlating with the complex collection present on the prior ultrasound. Most of the scrotum is not included within the field of coverage on this exam.    Culture data: Reviewed    2 tissue samples antibody fluid sample from the scrotum have PMNs, no organisms cultures are pending    Antimicrobials:   IV cefepime since admission      Assessment / Diagnoses:  This is a 57 year old male with history of liver cirrhosis, alcoholism, history of hepatitis C virus infection treated in 2021 with Epclusa splenomegaly, hospitalized to [facility] with right-sided scrotal pain swelling and tenderness, was recently given levofloxacin from emergency room visit  Scrotal cellulitis with an abscess -status post I&D, cultures obtained, smear with GPCs, multiple operative sample cultures growing out coag-negative staph-? Staphylococcus  lugdunensis  Epididymo-orchitis  History of liver cirrhosis   History of hepatitis C virus infection      Plan / Recommendations:    Continue linezolid  Discontinue IV cefepime  Await identification the coag-negative staph and sensitivities Likely oral antibiotics at discharge possibly Bactrim    Discussed with the patient and Dr. Edison Arita MD  Infectious Disease  Office: [phone]

## (undated) DIAGNOSIS — Z89.411 HISTORY OF PARTIAL RAY AMPUTATION OF RIGHT GREAT TOE (HCC): Primary | ICD-10-CM

## (undated) DIAGNOSIS — Z98.890 PERIPHERAL ARTERIAL DISEASE WITH HISTORY OF REVASCULARIZATION (HCC): ICD-10-CM

## (undated) DIAGNOSIS — I73.9 PERIPHERAL ARTERIAL DISEASE WITH HISTORY OF REVASCULARIZATION (HCC): ICD-10-CM

## (undated) DIAGNOSIS — E11.42 TYPE 2 DIABETES MELLITUS WITH POLYNEUROPATHY (HCC): ICD-10-CM

## (undated) DIAGNOSIS — E11.65 UNCONTROLLED TYPE 2 DIABETES MELLITUS WITH HYPERGLYCEMIA (HCC): ICD-10-CM

## (undated) DEVICE — PRECISION OFFSET (9.0 X 0.51 X 25.0MM)

## (undated) DEVICE — COTTON UNDERCAST PADDING,REGULAR FINISH: Brand: WEBRIL

## (undated) DEVICE — SOL  .9 1000ML BTL

## (undated) DEVICE — GAMMEX® NON-LATEX PI ORTHO SIZE 7.5, STERILE POLYISOPRENE POWDER-FREE SURGICAL GLOVE: Brand: GAMMEX

## (undated) DEVICE — TRAY SKIN PREP PVP-1

## (undated) DEVICE — ABDOMINAL PAD: Brand: CURITY

## (undated) DEVICE — LOWER EXTREMITY: Brand: MEDLINE INDUSTRIES, INC.

## (undated) DEVICE — GAMMEX® PI HYBRID SIZE 7.5, STERILE POWDER-FREE SURGICAL GLOVE, POLYISOPRENE AND NEOPRENE BLEND: Brand: GAMMEX

## (undated) DEVICE — SUTURE ETHILON 2-0 FS

## (undated) DEVICE — ZIMMER® STERILE DISPOSABLE TOURNIQUET CUFF WITH PLC, DUAL PORT, SINGLE BLADDER, 18 IN. (46 CM)

## (undated) DEVICE — PCKNG STRP IODOFORM 1/4INX5YD

## (undated) DEVICE — PADDING CAST WYTEX 2\" STER

## (undated) DEVICE — HANDPIECE SET WITH HIGH FLOW TIP AND SUCTION TUBE: Brand: INTERPULSE

## (undated) DEVICE — INTENDED FOR TISSUE SEPARATION, AND OTHER PROCEDURES THAT REQUIRE A SHARP SURGICAL BLADE TO PUNCTURE OR CUT.: Brand: BARD-PARKER ® STAINLESS STEEL BLADES

## (undated) DEVICE — CULTURE COLLECT/TRANSPORT SYS

## (undated) DEVICE — CULTURE TUBE ANAEROBIC

## (undated) DEVICE — PRECISION (7.0 X 0.51 X 18.5MM)

## (undated) NOTE — LETTER
9/8/2021          To Whom It May Concern:    Son Heredia is currently under my medical care and may not return to work. Off work until the next office visit of 09/15/21  If you require additional information please contact our office.       Sincerely,

## (undated) NOTE — LETTER
11/3/21          To Whom It May Concern:    Lorelei Espinoza is currently under my medical care and may not return to work at this time. He will be reevaluated at his next appointment on 12/15/21.  If you have any questions please contact our office.       I

## (undated) NOTE — LETTER
8/18/2021          To Whom It May Concern:    Hallie Bernabe has been under our care regarding ongoing medical issues. Because of this, he has been required to restrict his physical activities.   He has not been cleared to work at his appointment today, an

## (undated) NOTE — LETTER
9/8/2021          To Whom It May Concern:    Russ Anguiano is currently under my medical care and may not return to work. Off work until the next office visit of 8/15/21  If you require additional information please contact our office.         Sincerely,

## (undated) NOTE — LETTER
12/15/2021                     To Whom it may concern,    Jermaine Pichardo is currently under my medical care and may not return to work at this time. He should remain of work until January the 26th. He will be reevaluated at that time.  If you have an

## (undated) NOTE — LETTER
[address]  [address] [place], [place]      Authorization for Surgical Operation and Procedure     Date:___________                                                                                                         Time:_______ potential risks that can occur: fever and allergic reactions, hemolytic reactions, transmission of diseases such as Hepatitis, AIDS and Cytomegalovirus (CMV) and fluid overload.   In the event that I wish to have an autologous transfusion of my own blood, o will determine when the applicable recovery period ends for purposes of reinstating the DNAR order.   10. Patients having a sterilization procedure: I understand that if the procedure is successful the results will be permanent and it will therefore be impo _______________________________________________________________ _____________________________  Harpreet Mann                                                                                         (Date)                                   (Time)

## (undated) NOTE — LETTER
5/17/2022          To Whom It May Concern:    Teresa Hendricks is currently under my medical care and may not return to work at this time. Please excuse Kenneth Guerrero for the next 2 months. He will be re-evaluated by me on 7/19/22. If you require additional information please contact our office.         Sincerely,    Rajat Hanley DPM

## (undated) NOTE — LETTER
[address] [place] [address]  Authorization for Surgical Operation or Procedure  Date: ___07/21/2021____       Time: ___12:45pm___  1.  I hereby authorize Dr. Faustino Shone  , my physician and the assistant, to perform the following opera the operations or procedures to be performed for the purposes of advancing medicine, science, and/or education, provided my identity is not revealed. If the procedure has been videotaped, the physician/surgeon will obtain the original videotape.  [facility] reasonable alternative to the proposed treatment. I have also explained the risks and benefits involved in the refusal of the proposed treatment and have answered the patient's questions.  If I have a significant financial interest in a co-management agreem

## (undated) NOTE — LETTER
5/25/2021          To Whom It May Concern:    Vi Feng is currently under my medical care . Please excuse him from work until further notice. If you require additional information please contact our office.         Sincerely,    Richa Cifuentes

## (undated) NOTE — LETTER
6/23/2021          To Whom It May Concern:    Milagro Winston is currently under my medical care and may not return to work until his wound is healed. He will be reevaluated in 1 week by me.        If you require additional information please contact our of

## (undated) NOTE — LETTER
3/22/2022                 To Whom It May Concern,    Adi Morales is currently under my medical care and cannot return to work at this time. He will need to be off until May 17th,2022. He will be reevaluated at that time. If you require more information please contact our office. Sincerely,    Gera Brandon DPM  [facility].  [address]  [address]  [phone]

## (undated) NOTE — LETTER
11/8/2022                 To Whom It May Concern,    Alessandro Chaudhary is currently under my medical care. He may not return to work at this time. He will be reevaluated on January the 24th of 2023. If you require more information please contact our office. Sincerely,    Rashid Corbett DPM  [facility].  [address]  [address]  [phone]

## (undated) NOTE — LETTER
[address] [facility]     I agree to have a Peripherally Inserted Central Catheter (PICC) placed in my arm.    1. The PICC insertion procedure, care, maintenance, risks, benefits, and complications have been explained to me b the PICC, including risks, benefits, and side effects related to the alternatives and risks related to not receiving this procedure.     8.  I have expressed any questions about this procedure to my physician or the PICC Proceduralist and he/she has answere

## (undated) NOTE — LETTER
7/19/2022          To Whom It May Concern:    Abhishek Taylor is currently under my medical care and may not return to work for 2 more months. He will be reevaluated by me at that time. If you require additional information please contact our office.         Sincerely,    Vic Andujar DPM

## (undated) NOTE — LETTER
9/22/2021                    To Whom it may concern,    Karime Ash is currently under my medical care and may not return to work at this time. He will be reevaluated at his next appointment on 9/29/2021.  If you have any questions please contact

## (undated) NOTE — LETTER
8/25/2021          To Whom It May Concern:    Jamshid Platt has been under our care regarding ongoing medical issues. Because of this, he has been required to restrict his physical activities.   He has not been cleared to work at his appointment today, an

## (undated) NOTE — LETTER
7/19/2022          To Whom It May Concern:    Marvin Dang is currently under my medical care and may not return to work until after 9/20/2022. He will be reevaluated by me in 2 months. If you require additional information please contact our office.         Sincerely,    Allan Grant DPM

## (undated) NOTE — LETTER
7/27/2021                       To Whom it may concern,    Milagro Winston is currently under my medical care and may not return to work at this time. His appointment is scheduled for August 4th and he will be re-evaluated at that time.  Please contac

## (undated) NOTE — LETTER
[facility]  Administration of Anesthesia  1. Dane Brown, or _________________________________ acting on his behalf, (Patient) (Dependent/Representative) request to receive anesthesia for my pending procedure/operation/treatment.   A bleeding, seizure, cardiac arrest and death. 7. AWARENESS: I understand that it is possible (but unlikely) to have explicit memory of events from the operating room while under general anesthesia.   8. ELECTROCONVULSIVE THERAPY PATIENTS: This consent serve below affirms that prior to the time of the procedure, I have explained to the patient and/or his/her guardian, the risks and benefits of undergoing anesthesia, as well as any reasonable alternatives.     ___________________________________________________

## (undated) NOTE — LETTER
7/27/2021                       To Whom it may Concern,    Aram Mancilla is currently under my medical care and he should be off work until his next appointment. If you have any questions please call our office.

## (undated) NOTE — LETTER
5/25/2021          To Whom It May Concern:    Joshua Britt is currently under my medical care. Please excuse him from work starting 4/23/21 and she should be off work until further notice.    If you require additional information please contact our offi

## (undated) NOTE — LETTER
[facility], [place]  Authorization for Invasive Procedures  1.  I hereby authorize Dr. Gt Valenzuela , my physician and whomever may be designated as the doctor's assistant, to perform the following operation and/or procedure:  Right f of the potential risks that can occur: fever and allergic reactions, hemolytic reactions, transmission of disease such as hepatitis, AIDS, cytomegalovirus (CMV), and flluid overload.  In the event that I wish to have autologous transfusions of my own blood, in the judgment of my physician.      Signature of Patient:  ________________________________________________ Date: _________Time: _________    Responsible person in case of minor or unconscious: _____________________________Relationship: ____________     Rafael You

## (undated) NOTE — LETTER
[facility], [place]  Authorization for Invasive Procedures  1.  I hereby authorize Dr. Sahil Morejon , my physician and whomever may be designated as the doctor's assistant, to perform the following operation and/or procedure:  right low potential risks that can occur: fever and allergic reactions, hemolytic reactions, transmission of disease such as hepatitis, AIDS, cytomegalovirus (CMV), and flluid overload.  In the event that I wish to have autologous transfusions of my own blood, or a d judgment of my physician.      Signature of Patient:  ________________________________________________ Date: _________Time: _________    Responsible person in case of minor or unconscious: _____________________________Relationship: ____________     Witness

## (undated) NOTE — LETTER
6/30/2021          To Whom It May Concern:    Jamshid Platt is currently under my medical care and may not return to work at this time until his wound is healed. He will be reevaluated in 1 week by me.        If you require additional information please c

## (undated) NOTE — LETTER
8/4/2021                    To Whom it may concern,    Lazaro Jones is currently under my medical care and will need to be off until his next appt on August the 11th. If you have any questions please contact the office.                           Si

## (undated) NOTE — LETTER
10/6/21          To Whom It May Concern:    Miki Fletcher is currently under my medical care and may not return to work at this time. He will be reevaluated at his next appointment on 11/3/21.  If you have any questions please contact our office.       If

## (undated) NOTE — LETTER
[facility]  Administration of Anesthesia  1. Jeff Bess, or _________________________________ acting on his behalf, (Patient) (Dependent/Representative) request to receive anesthesia for my pending procedure/operation/treatment.   A bleeding, seizure, cardiac arrest and death. 7. AWARENESS: I understand that it is possible (but unlikely) to have explicit memory of events from the operating room while under general anesthesia.   8. ELECTROCONVULSIVE THERAPY PATIENTS: This consent serve below affirms that prior to the time of the procedure, I have explained to the patient and/or his/her guardian, the risks and benefits of undergoing anesthesia, as well as any reasonable alternatives.     ___________________________________________________

## (undated) NOTE — LETTER
8/30/2021          To Whom It May Concern:    Khalida  has been under our care regarding ongoing medical issues. Because of this, he has been required to restrict his physical activities.   He has not been cleared to work at his appointment today, an

## (undated) NOTE — LETTER
[address]      Authorization for Surgical Operation and Procedure     Date:___4/29/2021__                                                                                                         Time:__15 following are some, but not all, of the potential risks that can occur: fever and allergic reactions, hemolytic reactions, transmission of diseases such as Hepatitis, AIDS and Cytomegalovirus (CMV) and fluid overload.   In the event that I wish to have an a The surgeon or my attending physician will determine when the applicable recovery period ends for purposes of reinstating the DNAR order.   10. Patients having a sterilization procedure: I understand that if the procedure is successful the results will be p with my patient.     _______________________________________________________________ _____________________________  Mansoor Amanda                                                                                         (Date)

## (undated) NOTE — LETTER
9/15/2021          To Whom It May Concern:    Brynn Torres is currently under my medical care and may not return to work at this time. Until he is reevaluated on 9/22/21. If you require additional information please contact our office.         Maurice Ng

## (undated) NOTE — LETTER
Date & Time: 4/28/2023, 1:03 AM  Patient: Suad Izaguirre  Encounter Provider(s):    Mehul Sauceda MD         This certifies that Olive Kerr, a patient at an [address] [facility], am leaving the facility voluntarily and against the advice of my physician. I acknowledge that I have been:    1. informed that my physician believes that I need to receive care here;  2. informed that if I leave, I could become sicker or even die; and  3. provided discharge instructions consistent with my current diagnosis. I hereby release my physician, the facility, and its employees from all responsibility for any ill effects which may result from this action. __________________________________  Patient or authorized caregiver signature    __________________________________  RN signature    If no patient or patient representative signature was obtained, sign below to acknowledge that the form was reviewed with the patient and that the patient refused to sign.     __________________________________  RN signature

## (undated) NOTE — LETTER
To Whom It May Concern:    Narda Babinski has been under our care regarding ongoing medical issues. Because of this, he has been required to restrict her physical activities.   He has not been cleared to work at his appointment today, and will be reevalu

## (undated) NOTE — LETTER
[address]  [facility] [address], [place], [place]  [address]  INFORMED CONSENT FOR TRANSFUSION OF BLOOD OR BLOOD PRODUCTS  My physician has informed me of the nature, purpose, benefits and risks of transfusion for blood and blood components that ______________________________________________  (Signature of Patient)                                                            (Responsible party in case of Minor,

## (undated) NOTE — LETTER
9/29/2021          To Whom It May Concern:    Shahida Garcia is currently under my medical care and may not return to work at this time. He will be reevaluated at his next appointment on 10/6/2021.  If you have any questions please contact our office.

## (undated) NOTE — LETTER
9/20/2022          To Whom It May Concern:    Alessandro Chaudhary is currently under my medical care and may not return to work at this time. Please excuse Ron Samuel for the next 6 weeks, at which time we will see him back in the office to re evaluate. If you require additional information please contact our office.         Sincerely,    Rashid Corbett DPM

## (undated) NOTE — LETTER
6/14/2021          To Whom It May Concern:    Margaretjesica Colón is currently under my medical care. He had a scheduled appointment on 6/14/21. Please excuse him from work until his next follow-up appointment. He is scheduled to follow-up with me on 6/23/21.

## (undated) NOTE — LETTER
[address] [facility]     I agree to have a Peripherally Inserted Central Catheter (PICC) placed in my arm.    1. The PICC insertion procedure, care, maintenance, risks, benefits, and complications have been explained to me b the PICC, including risks, benefits, and side effects related to the alternatives and risks related to not receiving this procedure.     8.  I have expressed any questions about this procedure to my physician or the PICC Proceduralist and he/she has answere

## (undated) NOTE — LETTER
7/12/2021          To Whom It May Concern:    Aram Mancilla is currently under my medical care and may not return to work until his wound is healed. He will be reevaluated in 1 week by me.       If you require additional information please contact our off